# Patient Record
Sex: FEMALE | Race: WHITE | NOT HISPANIC OR LATINO | Employment: FULL TIME | URBAN - NONMETROPOLITAN AREA
[De-identification: names, ages, dates, MRNs, and addresses within clinical notes are randomized per-mention and may not be internally consistent; named-entity substitution may affect disease eponyms.]

---

## 2017-01-30 ENCOUNTER — TELEPHONE (OUTPATIENT)
Dept: FAMILY MEDICINE | Facility: OTHER | Age: 42
End: 2017-01-30

## 2017-01-30 ENCOUNTER — OFFICE VISIT (OUTPATIENT)
Dept: FAMILY MEDICINE | Facility: OTHER | Age: 42
End: 2017-01-30
Attending: FAMILY MEDICINE
Payer: COMMERCIAL

## 2017-01-30 VITALS
OXYGEN SATURATION: 98 % | SYSTOLIC BLOOD PRESSURE: 112 MMHG | TEMPERATURE: 98.3 F | DIASTOLIC BLOOD PRESSURE: 76 MMHG | HEIGHT: 66 IN | WEIGHT: 164 LBS | BODY MASS INDEX: 26.36 KG/M2 | HEART RATE: 74 BPM

## 2017-01-30 DIAGNOSIS — J20.9 ACUTE BRONCHITIS, UNSPECIFIED ORGANISM: Primary | ICD-10-CM

## 2017-01-30 PROCEDURE — 99213 OFFICE O/P EST LOW 20 MIN: CPT | Performed by: FAMILY MEDICINE

## 2017-01-30 RX ORDER — AZITHROMYCIN 250 MG/1
TABLET, FILM COATED ORAL
Qty: 6 TABLET | Refills: 0 | Status: SHIPPED | OUTPATIENT
Start: 2017-01-30 | End: 2017-03-03

## 2017-01-30 ASSESSMENT — PAIN SCALES - GENERAL: PAINLEVEL: MODERATE PAIN (5)
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
## 2017-01-30 NOTE — TELEPHONE ENCOUNTER
Please schedule patient for date/time: can see today at 4:00    Have patient go to ER/Urgent Care Center. Urgent Care hours are 9:30 am to 8 pm, open 7 days a week. No.    Provider will call patient.No.    Other:

## 2017-01-30 NOTE — NURSING NOTE
"Chief Complaint   Patient presents with     URI     sinus symptoms. throat pain, cough, headache, hoarse voice Duration- 3 days      *_* Health Care Directive *_*     done        Initial /76 mmHg  Pulse 74  Temp(Src) 98.3  F (36.8  C) (Tympanic)  Ht 5' 6\" (1.676 m)  Wt 164 lb (74.39 kg)  BMI 26.48 kg/m2  SpO2 98% Estimated body mass index is 26.48 kg/(m^2) as calculated from the following:    Height as of this encounter: 5' 6\" (1.676 m).    Weight as of this encounter: 164 lb (74.39 kg).  BP completed using cuff size: blanca HUSAIN      "

## 2017-01-30 NOTE — MR AVS SNAPSHOT
After Visit Summary   1/30/2017    Anabella Albrecht    MRN: [id number]           Patient Information     Date Of Birth          1975        Visit Information        Provider Department      1/30/2017 2:00 PM REAGAN Webster MD [facility]        Today's Diagnoses     Acute bronchitis, unspecified organism    -  1       Care Instructions    Follow up as needed        Follow-ups after your visit        Your next 10 appointments already scheduled     Jan 30, 2017  2:00 PM   (Arrive by 1:45 PM)   SHORT with ERAGAN Webster MD   [facility]    [address]  [facility] [address]   [phone]              Who to contact     If you have questions or need follow up information about today's clinic visit or your schedule please contact [facility] directly at [phone].  Normal or non-critical lab and imaging results will be communicated to you by MyChart, letter or phone within 4 business days after the clinic has received the results. If you do not hear from us within 7 days, please contact the clinic through MyChart or phone. If you have a critical or abnormal lab result, we will notify you by phone as soon as possible.  Submit refill requests through [employer] or call your pharmacy and they will forward the refill request to us. Please allow 3 business days for your refill to be completed.          Additional Information About Your Visit        MyChart Information     [employer] gives you secure access to your electronic health record. If you see a primary care provider, you can also send messages to your care team and make appointments. If you have questions, please call your primary care clinic.  If you do not have a primary care provider, please call [phone] and they will assist you.        Care EveryWhere ID     This is your Care EveryWhere ID. This could be used by other organizations to access your [facility]  "records  [id number]        Your Vitals Were     Pulse Temperature Height BMI (Body Mass Index) Pulse Oximetry       74 98.3  F (36.8  C) (Tympanic) 5' 6\" (1.676 m) 26.48 kg/m2 98%        Blood Pressure from Last 3 Encounters:   01/30/17 112/76   10/17/16 114/76   09/27/16 110/70    Weight from Last 3 Encounters:   01/30/17 164 lb (74.39 kg)   10/17/16 150 lb (68.04 kg)   09/27/16 160 lb (72.576 kg)              Today, you had the following     No orders found for display         Today's Medication Changes          These changes are accurate as of: 1/30/17  1:44 PM.  If you have any questions, ask your nurse or doctor.               Start taking these medicines.        Dose/Directions    azithromycin 250 MG tablet   Commonly known as:  ZITHROMAX   Used for:  Acute bronchitis, unspecified organism   Started by:  REAGAN Webster MD        Two tablets first day, then one tablet daily for four days.   Quantity:  6 tablet   Refills:  0            Where to get your medicines      These medications were sent to [facility] [place] - [address]  [address] [place] [address]     Phone:  [phone]    - azithromycin 250 MG tablet             Primary Care Provider Office Phone # Fax #    Elis Webster [phone]       [facility] [address] [facility] [place] [address]        Thank you!     Thank you for choosing [facility]  for your care. Our goal is always to provide you with excellent care. Hearing back from our patients is one way we can continue to improve our services. Please take a few minutes to complete the written survey that you may receive in the mail after your visit with us. Thank you!             Your Updated Medication List - Protect others around you: Learn how to safely use, store and throw away your medicines at www.disposemymeds.org.          This list is accurate as of: 1/30/17  1:44 PM.  Always use your most recent med list.          "          Brand Name Dispense Instructions for use    azithromycin 250 MG tablet    ZITHROMAX    6 tablet    Two tablets first day, then one tablet daily for four days.       FLUoxetine 40 MG capsule    PROzac    90 capsule    TAKE 1 CAPSULE BY MOUTH EVERY DAY

## 2017-01-30 NOTE — TELEPHONE ENCOUNTER
11:58 AM    Reason for Call: OVERBOOK    Patient is having the following symptoms: Patient has throat pain and cough for 3 days.    The patient is requesting an appointment for Elis Webster     Was an appointment offered for this call? Yes    Preferred method for responding to this message: Telephone Call    If we cannot reach you directly, may we leave a detailed response at the number you provided? Yes    Can this message wait until your PCP/provider returns, if unavailable today? teresa sandoval

## 2017-03-03 ENCOUNTER — HOSPITAL ENCOUNTER (EMERGENCY)
Facility: HOSPITAL | Age: 42
Discharge: HOME | End: 2017-03-03
Attending: NURSE PRACTITIONER | Admitting: NURSE PRACTITIONER
Payer: COMMERCIAL

## 2017-03-03 ENCOUNTER — TELEPHONE (OUTPATIENT)
Dept: FAMILY MEDICINE | Facility: OTHER | Age: 42
End: 2017-03-03

## 2017-03-03 VITALS
RESPIRATION RATE: 16 BRPM | DIASTOLIC BLOOD PRESSURE: 72 MMHG | SYSTOLIC BLOOD PRESSURE: 105 MMHG | TEMPERATURE: 97.5 F | OXYGEN SATURATION: 99 % | HEART RATE: 71 BPM

## 2017-03-03 DIAGNOSIS — R31.9 HEMATURIA: ICD-10-CM

## 2017-03-03 DIAGNOSIS — N32.89 BLADDER IRRITATION: ICD-10-CM

## 2017-03-03 LAB
ALBUMIN UR-MCNC: NEGATIVE MG/DL
APPEARANCE UR: CLEAR
BACTERIA #/AREA URNS HPF: ABNORMAL /HPF
BILIRUB UR QL STRIP: NEGATIVE
COLOR UR AUTO: YELLOW
GLUCOSE UR STRIP-MCNC: NEGATIVE MG/DL
HGB UR QL STRIP: NEGATIVE
HYALINE CASTS #/AREA URNS LPF: 1 /LPF (ref 0–2)
KETONES UR STRIP-MCNC: NEGATIVE MG/DL
LEUKOCYTE ESTERASE UR QL STRIP: NEGATIVE
MUCOUS THREADS #/AREA URNS LPF: PRESENT /LPF
NITRATE UR QL: NEGATIVE
PH UR STRIP: 7.5 PH (ref 4.7–8)
RBC #/AREA URNS AUTO: 8 /HPF (ref 0–2)
SP GR UR STRIP: 1.02 (ref 1–1.03)
URN SPEC COLLECT METH UR: ABNORMAL
UROBILINOGEN UR STRIP-MCNC: NORMAL MG/DL (ref 0–2)
WBC #/AREA URNS AUTO: 1 /HPF (ref 0–2)

## 2017-03-03 PROCEDURE — 81001 URINALYSIS AUTO W/SCOPE: CPT | Performed by: FAMILY MEDICINE

## 2017-03-03 PROCEDURE — 99213 OFFICE O/P EST LOW 20 MIN: CPT

## 2017-03-03 PROCEDURE — 99213 OFFICE O/P EST LOW 20 MIN: CPT | Performed by: NURSE PRACTITIONER

## 2017-03-03 RX ORDER — SULFAMETHOXAZOLE/TRIMETHOPRIM 800-160 MG
1 TABLET ORAL 2 TIMES DAILY
Qty: 14 TABLET | Refills: 0 | Status: SHIPPED | OUTPATIENT
Start: 2017-03-03 | End: 2017-03-06

## 2017-03-03 RX ORDER — PHENAZOPYRIDINE HYDROCHLORIDE 200 MG/1
200 TABLET, FILM COATED ORAL 3 TIMES DAILY
Qty: 20 TABLET | Refills: 0 | Status: SHIPPED | OUTPATIENT
Start: 2017-03-03 | End: 2017-03-06

## 2017-03-03 ASSESSMENT — ENCOUNTER SYMPTOMS
EYES NEGATIVE: 1
APPETITE CHANGE: 0
HEMATURIA: 0
FREQUENCY: 1
FLANK PAIN: 0
DYSURIA: 1
MUSCULOSKELETAL NEGATIVE: 1
ACTIVITY CHANGE: 0
RESPIRATORY NEGATIVE: 1
CHILLS: 0

## 2017-03-03 NOTE — ED AVS SNAPSHOT
[facility]    [address] [address]    Phone:  [phone]                                       Anabella Albrecht   MRN: [id number]    Department:  [facility]   Date of Visit:  3/3/2017           After Visit Summary Signature Page     I have received my discharge instructions, and my questions have been answered. I have discussed any challenges I see with this plan with the nurse or doctor.    ..........................................................................................................................................  Patient/Patient Representative Signature      ..........................................................................................................................................  Patient Representative Print Name and Relationship to Patient    ..................................................               ................................................  Date                                            Time    ..........................................................................................................................................  Reviewed by Signature/Title    ...................................................              ..............................................  Date                                                            Time

## 2017-03-03 NOTE — ED AVS SNAPSHOT
[facility]    [address]    [place] [address]    Phone:  [phone]                                       Anabella Albrecht   MRN: [id number]    Department:  [facility]   Date of Visit:  3/3/2017           Patient Information     Date Of Birth          1975        Your diagnoses for this visit were:     Bladder irritation     Hematuria        You were seen by Dona Lua NP.      Follow-up Information     Follow up with Elis Webster MD.    Specialty:  Family Practice    Why:  If symptoms worsen, As needed    Contact information:    [facility] [place]  [address]  [place] [address] [phone]          Follow up with [facility].    Specialty:  EMERGENCY MEDICINE    Why:  If symptoms worsen, As needed    Contact information:    [address]  [place] [address] [phone]    Additional information:    From [facility]: Take [address]. Turn left at [address]. Turn left at the first stoplight on [address]. At the first stop sign, take a right onto [address]. Take a left into the parking lot and continue through until you reach the North enterance of the building.       From [place]: Take [address]. Take the [address] ramp towards [place]. Turn left onto [address]. Take a slight right onto [address]. Turn left at the first stoplight on [address]. At the first stop sign, take a right onto [address]. Take a left into the parking lot and continue through until you reach the North enterance of the building.       From [place]: Take [address]. Take a right at [address]. At the first stop sign, take a right onto [address]. Take a left into the parking lot and continue through until you reach the North enterance of the building.       Discharge References/Attachments     HEMATURIA (ENGLISH)         Review of your medicines      START  taking        Dose / Directions Last dose taken    phenazopyridine 200 MG tablet   Commonly known as:  PYRIDIUM   Dose:  200 mg   Quantity:  20 tablet        Take 1 tablet (200 mg) by mouth 3 times daily for 3 days   Refills:  0        sulfamethoxazole-trimethoprim 800-160 MG per tablet   Commonly known as:  BACTRIM DS/SEPTRA DS   Dose:  1 tablet   Quantity:  14 tablet        Take 1 tablet by mouth 2 times daily for 3 days   Refills:  0          Our records show that you are taking the medicines listed below. If these are incorrect, please call your family doctor or clinic.        Dose / Directions Last dose taken    FLUoxetine 40 MG capsule   Commonly known as:  PROzac   Quantity:  90 capsule        TAKE 1 CAPSULE BY MOUTH EVERY DAY   Refills:  1                Prescriptions were sent or printed at these locations (2 Prescriptions)                   [facility] [place] - [address]   [address] [place] [address]    Telephone:  [phone]   Fax:  [phone]   Hours:                  E-Prescribed (2 of 2)         sulfamethoxazole-trimethoprim (BACTRIM DS/SEPTRA DS) 800-160 MG per tablet               phenazopyridine (PYRIDIUM) 200 MG tablet                Procedures and tests performed during your visit     Routine UA with microscopic      Orders Needing Specimen Collection     Ordered          03/03/17 1048  Urine Culture Aerobic Bacterial - ROUTINE, Prio: Routine, Needs to be Collected     Scheduled Task Status   03/03/17 1048 Collect Urine Culture Aerobic Bacterial Open   Order Class:  PCU Collect                  Pending Results     No orders found from 3/1/2017 to 3/4/2017.            Pending Culture Results     No orders found from 3/1/2017 to 3/4/2017.            Thank you for choosing [facility]       Thank you for choosing [facility] for your care. Our goal is always to provide you with excellent care. Hearing back from our patients is one way we can continue to improve our  services. Please take a few minutes to complete the written survey that you may receive in the mail after you visit with us. Thank you!        [employer] Information     [employer] gives you secure access to your electronic health record. If you see a primary care provider, you can also send messages to your care team and make appointments. If you have questions, please call your primary care clinic.  If you do not have a primary care provider, please call [phone] and they will assist you.        Care EveryWhere ID     This is your Care EveryWhere ID. This could be used by other organizations to access your [place] medical records  [id number]        After Visit Summary       This is your record. Keep this with you and show to your community pharmacist(s) and doctor(s) at your next visit.

## 2017-03-03 NOTE — TELEPHONE ENCOUNTER
8:07 AM    Reason for Call: OVERBOOK    Patient is having the following symptoms: uti  3rd or 4th day  The patient is requesting an appointment for pereira  Was an appointment offered for this call?noPreferred method for responding to this message:self    If we cannot reach you directly, may we leave a detailed response at the number you provided?yesCan this message wait until your PCP/provider returns, if unavailable today? Jos Greenfield

## 2017-03-03 NOTE — ED PROVIDER NOTES
History     Chief Complaint   Patient presents with     Rule out Urinary Tract Infection     urgency, frequency, burning with urination for last 3 days     The history is provided by the patient. No  was used.     Anabella Albrecht is a 41 year old female who presents with a week of dysuria, urinary urgency, frequency and supra pubic tenderness, denies flank pain, hematuria, rigors or chills.  Nofever.  She denies vaginal sx and no concerns regarding STI's.  She just finished her menses.  She has seen urology in the past with a negative uroscopy.     I have reviewed the Medications, Allergies, Past Medical and Surgical History, and Social History in the Epic system.    Review of Systems   Constitutional: Negative for activity change, appetite change and chills.   HENT: Negative.    Eyes: Negative.    Respiratory: Negative.    Genitourinary: Positive for dysuria, frequency and urgency. Negative for decreased urine volume, flank pain, hematuria, menstrual problem and vaginal discharge.   Musculoskeletal: Negative.    Skin: Negative.        Physical Exam   BP: 105/72  Pulse: 71  Temp: 97.5  F (36.4  C)  Resp: 16  SpO2: 99 %  Physical Exam   Constitutional: She is oriented to person, place, and time. She appears well-developed and well-nourished. No distress.   HENT:   Head: Normocephalic and atraumatic.   Eyes: Pupils are equal, round, and reactive to light.   Neck: Normal range of motion.   Cardiovascular: Normal rate.    Abdominal: Soft. Bowel sounds are normal. She exhibits no mass. There is no tenderness. There is no rebound and no guarding.   Minimal supra pubic tenderness   Musculoskeletal: Normal range of motion.   Neurological: She is alert and oriented to person, place, and time.   Skin: Skin is warm and dry. No rash noted. She is not diaphoretic. No pallor.   Nursing note and vitals reviewed.      ED Course     ED Course     Procedures               Labs Ordered and Resulted from Time of ED  Arrival Up to the Time of Departure from the ED   ROUTINE UA WITH MICROSCOPIC - Abnormal; Notable for the following:        Result Value    RBC Urine 8 (*)     Bacteria Urine None (*)     Mucous Urine Present (*)     Hyaline Casts 1 (*)     All other components within normal limits       Assessments & Plan (with Medical Decision Making)     I have reviewed the nursing notes.    I have reviewed the findings, diagnosis, plan and need for follow up with the patient.    Pathophysiology, possible etiology and treatment with potential outcomes, risks, benefits, and alternatives discussed to the best of my ability      Pt was very concerned there may be infection even though the UA is negative.  Presumptively treated with septra ds and will order UC.    She should use the pyridium for only the next 2 days or so.  Instructed on potential cases of bladder irritation.    Pt verbalizes understanding and agreement with plan.  Follow up for worsening symptoms    Discharge Medication List as of 3/3/2017 10:51 AM      START taking these medications    Details   sulfamethoxazole-trimethoprim (BACTRIM DS/SEPTRA DS) 800-160 MG per tablet Take 1 tablet by mouth 2 times daily for 3 days, Disp-14 tablet, R-0, E-Prescribe      phenazopyridine (PYRIDIUM) 200 MG tablet Take 1 tablet (200 mg) by mouth 3 times daily for 3 days, Disp-20 tablet, R-0, E-Prescribe             Final diagnoses:   Bladder irritation   Hematuria       3/3/2017   [facility]     Dona Lua NP  03/03/17 5023

## 2017-04-26 ENCOUNTER — OFFICE VISIT (OUTPATIENT)
Dept: OBGYN | Facility: OTHER | Age: 42
End: 2017-04-26
Attending: ADVANCED PRACTICE MIDWIFE
Payer: COMMERCIAL

## 2017-04-26 VITALS
HEIGHT: 66 IN | DIASTOLIC BLOOD PRESSURE: 68 MMHG | BODY MASS INDEX: 24.11 KG/M2 | SYSTOLIC BLOOD PRESSURE: 110 MMHG | OXYGEN SATURATION: 98 % | WEIGHT: 150 LBS | HEART RATE: 75 BPM

## 2017-04-26 DIAGNOSIS — N93.9 ABNORMAL UTERINE BLEEDING: Primary | ICD-10-CM

## 2017-04-26 PROBLEM — N92.0 MENORRHAGIA WITH REGULAR CYCLE: Status: ACTIVE | Noted: 2017-04-26

## 2017-04-26 LAB
APTT PPP: 28 SEC (ref 24–37)
CLOSURE TME COLL+EPINEP BLD: 98 SEC
ERYTHROCYTE [DISTWIDTH] IN BLOOD BY AUTOMATED COUNT: 12.4 % (ref 10–15)
EST. AVERAGE GLUCOSE BLD GHB EST-MCNC: 100 MG/DL
HBA1C MFR BLD: 5.1 % (ref 4.3–6)
HCT VFR BLD AUTO: 38.8 % (ref 35–47)
HGB BLD-MCNC: 12.7 G/DL (ref 11.7–15.7)
INR PPP: 1.02 (ref 0.8–1.2)
MCH RBC QN AUTO: 29.3 PG (ref 26.5–33)
MCHC RBC AUTO-ENTMCNC: 32.7 G/DL (ref 31.5–36.5)
MCV RBC AUTO: 89 FL (ref 78–100)
MICRO REPORT STATUS: NORMAL
PLATELET # BLD AUTO: 256 10E9/L (ref 150–450)
RBC # BLD AUTO: 4.34 10E12/L (ref 3.8–5.2)
SPECIMEN SOURCE: NORMAL
TSH SERPL DL<=0.005 MIU/L-ACNC: 1.7 MU/L (ref 0.4–4)
WBC # BLD AUTO: 6.9 10E9/L (ref 4–11)
WET PREP SPEC: NORMAL

## 2017-04-26 PROCEDURE — 84270 ASSAY OF SEX HORMONE GLOBUL: CPT | Mod: 90 | Performed by: ADVANCED PRACTICE MIDWIFE

## 2017-04-26 PROCEDURE — 99213 OFFICE O/P EST LOW 20 MIN: CPT | Performed by: ADVANCED PRACTICE MIDWIFE

## 2017-04-26 PROCEDURE — 85610 PROTHROMBIN TIME: CPT | Performed by: ADVANCED PRACTICE MIDWIFE

## 2017-04-26 PROCEDURE — 87210 SMEAR WET MOUNT SALINE/INK: CPT | Performed by: ADVANCED PRACTICE MIDWIFE

## 2017-04-26 PROCEDURE — 88142 CYTOPATH C/V THIN LAYER: CPT | Performed by: ADVANCED PRACTICE MIDWIFE

## 2017-04-26 PROCEDURE — 85027 COMPLETE CBC AUTOMATED: CPT | Performed by: ADVANCED PRACTICE MIDWIFE

## 2017-04-26 PROCEDURE — 87624 HPV HI-RISK TYP POOLED RSLT: CPT | Mod: 90 | Performed by: ADVANCED PRACTICE MIDWIFE

## 2017-04-26 PROCEDURE — 85246 CLOT FACTOR VIII VW ANTIGEN: CPT | Mod: 90 | Performed by: ADVANCED PRACTICE MIDWIFE

## 2017-04-26 PROCEDURE — 83520 IMMUNOASSAY QUANT NOS NONAB: CPT | Mod: 90 | Performed by: ADVANCED PRACTICE MIDWIFE

## 2017-04-26 PROCEDURE — 99000 SPECIMEN HANDLING OFFICE-LAB: CPT | Performed by: ADVANCED PRACTICE MIDWIFE

## 2017-04-26 PROCEDURE — 83036 HEMOGLOBIN GLYCOSYLATED A1C: CPT | Performed by: ADVANCED PRACTICE MIDWIFE

## 2017-04-26 PROCEDURE — 85245 CLOT FACTOR VIII VW RISTOCTN: CPT | Mod: 90 | Performed by: ADVANCED PRACTICE MIDWIFE

## 2017-04-26 PROCEDURE — 85240 CLOT FACTOR VIII AHG 1 STAGE: CPT | Mod: 90 | Performed by: ADVANCED PRACTICE MIDWIFE

## 2017-04-26 PROCEDURE — 82627 DEHYDROEPIANDROSTERONE: CPT | Mod: 90 | Performed by: ADVANCED PRACTICE MIDWIFE

## 2017-04-26 PROCEDURE — 84403 ASSAY OF TOTAL TESTOSTERONE: CPT | Mod: 90 | Performed by: ADVANCED PRACTICE MIDWIFE

## 2017-04-26 PROCEDURE — 36415 COLL VENOUS BLD VENIPUNCTURE: CPT | Performed by: ADVANCED PRACTICE MIDWIFE

## 2017-04-26 PROCEDURE — 85576 BLOOD PLATELET AGGREGATION: CPT | Performed by: ADVANCED PRACTICE MIDWIFE

## 2017-04-26 PROCEDURE — 84443 ASSAY THYROID STIM HORMONE: CPT | Performed by: ADVANCED PRACTICE MIDWIFE

## 2017-04-26 PROCEDURE — 85730 THROMBOPLASTIN TIME PARTIAL: CPT | Performed by: ADVANCED PRACTICE MIDWIFE

## 2017-04-26 PROCEDURE — 84146 ASSAY OF PROLACTIN: CPT | Mod: 90 | Performed by: ADVANCED PRACTICE MIDWIFE

## 2017-04-26 ASSESSMENT — PAIN SCALES - GENERAL: PAINLEVEL: NO PAIN (0)

## 2017-04-26 NOTE — NURSING NOTE
"Chief Complaint   Patient presents with     Vaginal Bleeding     Heavy periods       Initial /68 (BP Location: Left arm, Patient Position: Chair, Cuff Size: Adult Regular)  Pulse 75  Ht 5' 6\" (1.676 m)  Wt 150 lb (68 kg)  LMP 04/18/2017  SpO2 98%  BMI 24.21 kg/m2 Estimated body mass index is 24.21 kg/(m^2) as calculated from the following:    Height as of this encounter: 5' 6\" (1.676 m).    Weight as of this encounter: 150 lb (68 kg).  Medication Reconciliation: charles Bolden      "

## 2017-04-26 NOTE — PROGRESS NOTES
"Anabella Albrecht is a 41 year old female  Here today for heavy periods.  Cycle:  25 days Bleed: 5 days with 4 heavy days with clots   Pain:  10/10  Contraception:   had vasectomy and was tested.  Reports hot flashes.  Vaginal discharge before and after period.  Denies any personal or family history of clotting disorder.  Reports some leg cramps, occasional light-headedness.  Pt seen Dr. ADAMS Webster for her annual appt. In Sept. 2016.    O:   /68 (BP Location: Left arm, Patient Position: Chair, Cuff Size: Adult Regular)  Pulse 75  Ht 5' 6\" (1.676 m)  Wt 150 lb (68 kg)  LMP 04/18/2017  SpO2 98%  BMI 24.21 kg/m2   Pleasant without acute distress  Pelvic:  Normal female genitalia without lesions.  Vagina well rugated without lesions.  Uterus retroflexed normal size, firm and freely mobile.  Cervix negative for CMT or lesions.  Milky discharge noted and friable.    A:  Menorrhagia  Dysmenorrhea  Hot flashes/night sweats   > 10 years/declines STI testing      P:    Pelvic exam  Pap with HPV  Labs:  CBC, A1C, TSH,wet prep, DHEA, free testosterone, PT, INR, Von willebrand, , AMA, Prolactin  Declines STI testing  Transvaginal pelvic ultrasound for AUB  RTO next week for EMB and discuss lab/US results        Greater than 25 minutes were spent face to face counseling this patient abnormal uterine bleeding, possible causes, labs, US, EMB, possible options for treatment depending on diagnosis.    NAMRATA Johnson, CNM    "

## 2017-04-26 NOTE — MR AVS SNAPSHOT
"              After Visit Summary   4/26/2017    Anabella Albrecht    MRN: [id number]           Patient Information     Date Of Birth          1975        Visit Information        Provider Department      4/26/2017 11:00 AM Jmi Amanda [facility]N [facility]        Today's Diagnoses     Abnormal uterine bleeding    -  1      Care Instructions    Return to office next week.        Follow-ups after your visit        Who to contact     If you have questions or need follow up information about today's clinic visit or your schedule please contact [facility] directly at [phone].  Normal or non-critical lab and imaging results will be communicated to you by [employer], letter or phone within 4 business days after the clinic has received the results. If you do not hear from us within 7 days, please contact the clinic through [employer] or phone. If you have a critical or abnormal lab result, we will notify you by phone as soon as possible.  Submit refill requests through [employer] or call your pharmacy and they will forward the refill request to us. Please allow 3 business days for your refill to be completed.          Additional Information About Your Visit        MyChart Information     [employer] gives you secure access to your electronic health record. If you see a primary care provider, you can also send messages to your care team and make appointments. If you have questions, please call your primary care clinic.  If you do not have a primary care provider, please call [phone] and they will assist you.        Care EveryWhere ID     This is your Care EveryWhere ID. This could be used by other organizations to access your [place] medical records  [id number]        Your Vitals Were     Pulse Height Last Period Pulse Oximetry BMI (Body Mass Index)       75 5' 6\" (1.676 m) 04/18/2017 98% 24.21 kg/m2        Blood Pressure from Last 3 Encounters:   04/26/17 110/68   03/03/17 105/72   01/30/17 " 112/76    Weight from Last 3 Encounters:   04/26/17 150 lb (68 kg)   01/30/17 164 lb (74.4 kg)   10/17/16 150 lb (68 kg)              We Performed the Following     A pap thin layer screen with  HPV - recommended age 30 - 65 years (select HPV order below)     Anti-Mullerian hormone     CBC with platelets     DHEA sulfate     Factor 8 assay     Hemoglobin A1c     HPV High Risk Types DNA Cervical     INR     Partial thromboplastin time     Platelet function closure with reflex     Prolactin     Testosterone Free and Total     TSH with free T4 reflex     US Transvaginal ([place])     Von Willebrand antigen     von Willebrand Factor Activity     Wet prep        Primary Care Provider Office Phone # Fax #    Elis Webster [phone]       [facility] [place] [address]  [place] [address]        Thank you!     Thank you for choosing [facility] [place]  for your care. Our goal is always to provide you with excellent care. Hearing back from our patients is one way we can continue to improve our services. Please take a few minutes to complete the written survey that you may receive in the mail after your visit with us. Thank you!             Your Updated Medication List - Protect others around you: Learn how to safely use, store and throw away your medicines at www.disposemymeds.org.          This list is accurate as of: 4/26/17 12:43 PM.  Always use your most recent med list.                   Brand Name Dispense Instructions for use    FLUoxetine 40 MG capsule    PROzac    90 capsule    TAKE 1 CAPSULE BY MOUTH EVERY DAY

## 2017-04-27 LAB — PROLACTIN SERPL-MCNC: 12 UG/L (ref 3–27)

## 2017-04-28 LAB — DHEA-S SERPL-MCNC: 106 UG/DL (ref 35–430)

## 2017-05-01 LAB
FACT VIII ACT/NOR PPP: 138 % (ref 55–200)
MIS SERPL-MCNC: 5.29 NG/ML
SHBG SERPL-SCNC: 62 NMOL/L (ref 30–135)
TESTOST FREE SERPL-MCNC: 0.61 NG/DL (ref 0.13–0.92)
TESTOST SERPL-MCNC: 52 NG/DL (ref 8–60)
VWF CBA/VWF AG PPP IA-RTO: 109 % (ref 50–200)
VWF:AC ACT/NOR PPP IA: 102 % (ref 50–180)

## 2017-05-03 LAB
COPATH REPORT: NORMAL
PAP: NORMAL

## 2017-05-05 ENCOUNTER — HOSPITAL ENCOUNTER (OUTPATIENT)
Dept: ULTRASOUND IMAGING | Facility: HOSPITAL | Age: 42
Discharge: HOME | End: 2017-05-05
Attending: ADVANCED PRACTICE MIDWIFE | Admitting: ADVANCED PRACTICE MIDWIFE
Payer: COMMERCIAL

## 2017-05-05 LAB
FINAL DIAGNOSIS: NORMAL
HPV HR 12 DNA CVX QL NAA+PROBE: NEGATIVE
HPV16 DNA SPEC QL NAA+PROBE: NEGATIVE
HPV18 DNA SPEC QL NAA+PROBE: NEGATIVE
SPECIMEN DESCRIPTION: NORMAL

## 2017-05-05 PROCEDURE — 76830 TRANSVAGINAL US NON-OB: CPT | Mod: TC

## 2017-05-05 PROCEDURE — 76856 US EXAM PELVIC COMPLETE: CPT | Mod: TC

## 2017-05-10 ENCOUNTER — OFFICE VISIT (OUTPATIENT)
Dept: OBGYN | Facility: OTHER | Age: 42
End: 2017-05-10
Attending: ADVANCED PRACTICE MIDWIFE
Payer: COMMERCIAL

## 2017-05-10 VITALS
OXYGEN SATURATION: 96 % | SYSTOLIC BLOOD PRESSURE: 110 MMHG | HEART RATE: 88 BPM | BODY MASS INDEX: 24.11 KG/M2 | DIASTOLIC BLOOD PRESSURE: 70 MMHG | WEIGHT: 150 LBS | HEIGHT: 66 IN

## 2017-05-10 DIAGNOSIS — N93.9 ABNORMAL UTERINE BLEEDING (AUB): Primary | ICD-10-CM

## 2017-05-10 LAB
MICRO REPORT STATUS: NORMAL
SPECIMEN SOURCE: NORMAL
WET PREP SPEC: NORMAL

## 2017-05-10 PROCEDURE — 99000 SPECIMEN HANDLING OFFICE-LAB: CPT | Performed by: ADVANCED PRACTICE MIDWIFE

## 2017-05-10 PROCEDURE — 58100 BIOPSY OF UTERUS LINING: CPT | Mod: 59 | Performed by: ADVANCED PRACTICE MIDWIFE

## 2017-05-10 PROCEDURE — 99213 OFFICE O/P EST LOW 20 MIN: CPT | Mod: 25 | Performed by: ADVANCED PRACTICE MIDWIFE

## 2017-05-10 PROCEDURE — 87591 N.GONORRHOEAE DNA AMP PROB: CPT | Mod: 90 | Performed by: ADVANCED PRACTICE MIDWIFE

## 2017-05-10 PROCEDURE — 58300 INSERT INTRAUTERINE DEVICE: CPT | Performed by: ADVANCED PRACTICE MIDWIFE

## 2017-05-10 PROCEDURE — 88305 TISSUE EXAM BY PATHOLOGIST: CPT | Mod: TC | Performed by: ADVANCED PRACTICE MIDWIFE

## 2017-05-10 PROCEDURE — 87210 SMEAR WET MOUNT SALINE/INK: CPT | Performed by: ADVANCED PRACTICE MIDWIFE

## 2017-05-10 PROCEDURE — 87491 CHLMYD TRACH DNA AMP PROBE: CPT | Mod: 90 | Performed by: ADVANCED PRACTICE MIDWIFE

## 2017-05-10 ASSESSMENT — PAIN SCALES - GENERAL: PAINLEVEL: NO PAIN (0)

## 2017-05-10 NOTE — NURSING NOTE
"Chief Complaint   Patient presents with     RECHECK     Abnormal Uterine bleeding follow up        Initial /70 (BP Location: Left arm, Patient Position: Chair, Cuff Size: Adult Regular)  Pulse 88  Ht 5' 6\" (1.676 m)  Wt 150 lb (68 kg)  LMP 04/18/2017  SpO2 96%  BMI 24.21 kg/m2 Estimated body mass index is 24.21 kg/(m^2) as calculated from the following:    Height as of this encounter: 5' 6\" (1.676 m).    Weight as of this encounter: 150 lb (68 kg).  Medication Reconciliation: charles Bolden      "

## 2017-05-10 NOTE — MR AVS SNAPSHOT
After Visit Summary   5/10/2017    Anabella Albrecht    MRN: [id number]           Patient Information     Date Of Birth          1975        Visit Information        Provider Department      5/10/2017 10:30 AM Jim Amanda APRN [facility]        Today's Diagnoses     Abnormal uterine bleeding (AUB)    -  1      Care Instructions    Return to office in 3 weeks for follow up,        Follow-ups after your visit        Your next 10 appointments already scheduled     May 31, 2017 11:00 AM CDT   (Arrive by 10:45 AM)   SHORT with NAMRATA Montez CNM   [facility] ([facility])    [address]  [facility] [address]   [phone]              Who to contact     If you have questions or need follow up information about today's clinic visit or your schedule please contact [facility] directly at [phone].  Normal or non-critical lab and imaging results will be communicated to you by MyChart, letter or phone within 4 business days after the clinic has received the results. If you do not hear from us within 7 days, please contact the clinic through VPEPhart or phone. If you have a critical or abnormal lab result, we will notify you by phone as soon as possible.  Submit refill requests through Optini or call your pharmacy and they will forward the refill request to us. Please allow 3 business days for your refill to be completed.          Additional Information About Your Visit        MyChart Information     Optini gives you secure access to your electronic health record. If you see a primary care provider, you can also send messages to your care team and make appointments. If you have questions, please call your primary care clinic.  If you do not have a primary care provider, please call [phone] and they will assist you.        Care EveryWhere ID     This is your Care EveryWhere ID. This could be used by other organizations to access your  "[place] medical records  [id number]        Your Vitals Were     Pulse Height Last Period Pulse Oximetry BMI (Body Mass Index)       88 5' 6\" (1.676 m) 04/18/2017 96% 24.21 kg/m2        Blood Pressure from Last 3 Encounters:   05/10/17 110/70   04/26/17 110/68   03/03/17 105/72    Weight from Last 3 Encounters:   05/10/17 150 lb (68 kg)   04/26/17 150 lb (68 kg)   01/30/17 164 lb (74.4 kg)              We Performed the Following     Chlamydia trachomatis PCR     ENDOMETRIAL BIOPSY W/O CERVICAL DILATION     HC LEVONORGESTREL IU 52MG 5 YR     INSERTION INTRAUTERINE DEVICE     Neisseria gonorrhoeae PCR     Surgical pathology exam     Wet prep          Today's Medication Changes          These changes are accurate as of: 5/10/17  4:31 PM.  If you have any questions, ask your nurse or doctor.               Start taking these medicines.        Dose/Directions    levonorgestrel 20 MCG/24HR IUD   Commonly known as:  MIRENA   Used for:  Abnormal uterine bleeding (AUB)   Started by:  Jim Amanda APRN CNM        Dose:  1 each   1 each (20 mcg) by Intrauterine route continuous   Refills:  0            Where to get your medicines      Some of these will need a paper prescription and others can be bought over the counter.  Ask your nurse if you have questions.     You don't need a prescription for these medications     levonorgestrel 20 MCG/24HR IUD                Primary Care Provider Office Phone # Fax #    Elis Webster [phone]       [facility] [place] [address] [place], [place] [address]K[place]  [facility] [address]        Thank you!     Thank you for choosing [facility]  for your care. Our goal is always to provide you with excellent care. Hearing back from our patients is one way we can continue to improve our services. Please take a few minutes to complete the written survey that you may receive in the mail after your visit with us. Thank you!             Your Updated Medication List - Protect others around " you: Learn how to safely use, store and throw away your medicines at www.disposemymeds.org.          This list is accurate as of: 5/10/17  4:31 PM.  Always use your most recent med list.                   Brand Name Dispense Instructions for use    FLUoxetine 40 MG capsule    PROzac    90 capsule    TAKE 1 CAPSULE BY MOUTH EVERY DAY       levonorgestrel 20 MCG/24HR IUD    MIRENA     1 each (20 mcg) by Intrauterine route continuous

## 2017-05-10 NOTE — PROGRESS NOTES
"Anabella Albrecht is a 41 year old female  Here today for AUB and contraceptive for bleeding.  Pt desires Mirena IUD.  Reviewed labs and imaging.      O:   /70 (BP Location: Left arm, Patient Position: Chair, Cuff Size: Adult Regular)  Pulse 88  Ht 5' 6\" (1.676 m)  Wt 150 lb (68 kg)  LMP 04/18/2017  SpO2 96%  BMI 24.21 kg/m2   CC:  IUD insertion for bleeding control  HPI:  Anabella Albrecht is a 41 year old female Patient's last menstrual period was 04/18/2017.  No other c/o.  She is here for an IUD insertion. Patient has verbalized understanding of risks and benefits and has signed the consent form.          Prior ectopic n  Prior CT n    Allergies: Review of patient's allergies indicates no known allergies.    EXAM:  Blood pressure 110/70, pulse 88, height 5' 6\" (1.676 m), weight 150 lb (68 kg), last menstrual period 04/18/2017, SpO2 96 %, not currently breastfeeding.  General - pleasant female in no acute distress.  Pelvic - EG: normal adult female, BUS: within normal limits,   Vagina: well rugated, no discharge,   Cervix: no lesions or CMT  Uterus: retroflexed, firm, normal sized and nontender, Adnexae: no masses or tenderness.    PROCEDURE:  After informed consent was obtained from the patient, a speculum was placed in the vagina to visualize the cervix.  Tenaculum was applied to the anterior cervical lip. Endometrial biopsy pipelle was passed through the cervical os and tissue obtained.  Uterus sounded to 6 cm.  Tenaculum was removed and sites were hemostatic. There were no complications. The patient tolerated the procedure well with a minimal amount of cramping noted.  Specimen was sent to pathology.        PROCEDURE:  After informed consent was obtained from the patient, a speculum was placed in the vagina to visualized the cervix  Tenaculum was placed at the 12 o'clock.  The Mirena  IUD was then placed in the usual fashion.  Strings were clipped about 2-3 cm from the cervical os.  Tenaculum was " removed and cervix was hemostatic. There were no complications. The patient tolerated the procedure well.    5/10 went to 3/10 immediately after.    The patient should feel for the IUD strings after her next menses.  If unable to locate them, she should return to clinic for a speculum examination for confirmation that the IUD is in place. Bleeding pattern of this particular IUD was discussed with the patient. She is aware that the IUD will need to be removed in 5 years or PRN.  She is to return in 3 wks  to clinic and for her next annual or PRN.      .  A:  AUB  Menorrhagia  Desires Mirena IUD    P:  EMB  Wet prep, GC/CT  Mirena IUD  Return in 3 weeks for follow up    Greater than 15 minutes in addition to procedures were spent face to face counseling this patient reviewing labs and imaging, contraceptives for bleeding, effectiveness, risks, and benefits, described both procedures, EMB and insertton of IUD and the risks and benefits of both.  Discussed other hormonal issues including breast discomfort and cramping.      NAMRATA Johnson, CNM

## 2017-05-11 ENCOUNTER — TELEPHONE (OUTPATIENT)
Dept: FAMILY MEDICINE | Facility: OTHER | Age: 42
End: 2017-05-11

## 2017-05-11 NOTE — TELEPHONE ENCOUNTER
Follow up:  Pt had EMB and IUD insertion yesterday.  Pt reports only occasional mild cramping without bleeding.

## 2017-05-12 LAB
C TRACH DNA SPEC QL NAA+PROBE: NORMAL
COPATH REPORT: NORMAL
N GONORRHOEA DNA SPEC QL NAA+PROBE: NORMAL
SPECIMEN SOURCE: NORMAL
SPECIMEN SOURCE: NORMAL

## 2017-05-31 ENCOUNTER — OFFICE VISIT (OUTPATIENT)
Dept: OBGYN | Facility: OTHER | Age: 42
End: 2017-05-31
Attending: ADVANCED PRACTICE MIDWIFE
Payer: COMMERCIAL

## 2017-05-31 VITALS
BODY MASS INDEX: 24.11 KG/M2 | HEART RATE: 76 BPM | DIASTOLIC BLOOD PRESSURE: 72 MMHG | WEIGHT: 150 LBS | SYSTOLIC BLOOD PRESSURE: 108 MMHG | HEIGHT: 66 IN | OXYGEN SATURATION: 99 %

## 2017-05-31 DIAGNOSIS — Z30.49 ENCOUNTER FOR SURVEILLANCE OF OTHER CONTRACEPTIVE: ICD-10-CM

## 2017-05-31 PROCEDURE — 99212 OFFICE O/P EST SF 10 MIN: CPT | Performed by: ADVANCED PRACTICE MIDWIFE

## 2017-05-31 ASSESSMENT — PAIN SCALES - GENERAL: PAINLEVEL: EXTREME PAIN (8)

## 2017-05-31 NOTE — NURSING NOTE
"Chief Complaint   Patient presents with     RECHECK     IUD check        Initial /72 (BP Location: Left arm, Patient Position: Chair, Cuff Size: Adult Regular)  Pulse 76  Ht 5' 6\" (1.676 m)  Wt 150 lb (68 kg)  SpO2 99%  BMI 24.21 kg/m2 Estimated body mass index is 24.21 kg/(m^2) as calculated from the following:    Height as of this encounter: 5' 6\" (1.676 m).    Weight as of this encounter: 150 lb (68 kg).  Medication Reconciliation: charles Bolden      "

## 2017-05-31 NOTE — MR AVS SNAPSHOT
"              After Visit Summary   5/31/2017    Anabella Albrecht    MRN: [id number]           Patient Information     Date Of Birth          1975        Visit Information        Provider Department      5/31/2017 11:00 AM Jim Amanda [facility]N [facility]        Today's Diagnoses     Encounter for surveillance of other contraceptive          Care Instructions    Follow up as needed.          Follow-ups after your visit        Who to contact     If you have questions or need follow up information about today's clinic visit or your schedule please [facility] at [phone].  Normal or non-critical lab and imaging results will be communicated to you by Graviehart, letter or phone within 4 business days after the clinic has received the results. If you do not hear from us within 7 days, please contact the clinic through Graviehart or phone. If you have a critical or abnormal lab result, we will notify you by phone as soon as possible.  Submit refill requests through HubCast or call your pharmacy and they will forward the refill request to us. Please allow 3 business days for your refill to be completed.          Additional Information About Your Visit        MyChart Information     HubCast gives you secure access to your electronic health record. If you see a primary care provider, you can also send messages to your care team and make appointments. If you have questions, please call your primary care clinic.  If you do not have a primary care provider, please call [phone] and they will assist you.        Care EveryWhere ID     This is your Care EveryWhere ID. This could be used by other organizations to access your [place] medical records  [id number]        Your Vitals Were     Pulse Height Pulse Oximetry BMI (Body Mass Index)          76 5' 6\" (1.676 m) 99% 24.21 kg/m2         Blood Pressure from Last 3 Encounters:   05/31/17 108/72   05/10/17 110/70   04/26/17 " 110/68    Weight from Last 3 Encounters:   05/31/17 150 lb (68 kg)   05/10/17 150 lb (68 kg)   04/26/17 150 lb (68 kg)              Today, you had the following     No orders found for display       Primary Care Provider Office Phone # Fax #    Elis Webster [phone]       [facility] [place] [address]  [place] [address]        Thank you!     [facility] you [facility]  [facility] your [facility]. Our goal is always to provide you with excellent care. Hearing back from our patients is one way we can continue to improve our services. Please take a few minutes to complete the written survey that you may receive in the mail after your visit with us. Thank you!             Your Updated Medication List - Protect others around you: Learn how to safely use, store and throw away your medicines at www.disposemymeds.org.          This list is accurate as of: 5/31/17 11:59 AM.  Always use your most recent med list.                   Brand Name Dispense Instructions for use    levonorgestrel 20 MCG/24HR IUD    MIRENA     1 each (20 mcg) by Intrauterine route continuous

## 2017-05-31 NOTE — PROGRESS NOTES
"Anabella Albrecht is a 41 year old female  Here today for IUD 3 week follow up.  Reports she had her period the day after the IUD insertion.  She has experienced occasional spotting since then.  Denies pelvic pain or discomfort.  Breast are feeling better, less hard, and less painful.      O:   /72 (BP Location: Left arm, Patient Position: Chair, Cuff Size: Adult Regular)  Pulse 76  Ht 5' 6\" (1.676 m)  Wt 150 lb (68 kg)  SpO2 99%  BMI 24.21 kg/m2   Pleasant without acute distress.  Pelvic:  Normal female genitalia without lesions.  Vagina well rugated without lesions.  Cervix negative for lesions or CMT; minimal amount of brownish discharge.  IUD strings visible.    A:  Needs IUD placement 3 week follow up    P:  IUD strings visible.  RTO as needed    Greater than 10 minutes were spent face to face counseling this patient    NAMRATA Johnson, CNM    "

## 2017-07-10 ENCOUNTER — TELEPHONE (OUTPATIENT)
Dept: FAMILY MEDICINE | Facility: OTHER | Age: 42
End: 2017-07-10

## 2017-07-10 DIAGNOSIS — R61 GENERALIZED HYPERHIDROSIS: Primary | ICD-10-CM

## 2017-07-10 NOTE — TELEPHONE ENCOUNTER
Reason for call:  REFERRAL   1. Concern: Hyper hydrosis  2. Have you seen a provider for this concern? Yes  3. Who? Dr. Webster   4. When? Ongoing issue  5. What services are you requesting? Referral to skin renewal in [place]  Description: Patient would like to speak with nurse for a referral to skin renewal in [place].  Was an appointment offered for this a call? No  Preferred method for responding to this message: Telephone Call  If we cannot reach you directly, may we leave a detailed response at the number you provided? Yes  Can this message wait until your PCP/Provider returns if not available today? Not applicable,

## 2017-07-11 NOTE — TELEPHONE ENCOUNTER
New referral placed with correct diagnoses, please advise to send this one instead of the old one.

## 2017-07-11 NOTE — TELEPHONE ENCOUNTER
Pt called back and stated the wrong diagnosis was sent for this. I should have been for hyperhydrosis. Please call pt back at [phone]

## 2017-07-12 ENCOUNTER — TELEPHONE (OUTPATIENT)
Dept: FAMILY MEDICINE | Facility: OTHER | Age: 42
End: 2017-07-12

## 2017-07-12 NOTE — TELEPHONE ENCOUNTER
Patient called and said the diagnosis is still wrong for the referral for the skin renewal.  Please call patient at [phone].

## 2017-07-13 ENCOUNTER — TELEPHONE (OUTPATIENT)
Dept: FAMILY MEDICINE | Facility: OTHER | Age: 42
End: 2017-07-13

## 2017-07-13 NOTE — TELEPHONE ENCOUNTER
9:35 AM    Reason for Call: Phone Call    Description: Pt stated she needs to discuss a referral diagnosis code, but did not give further detail. Please call her back at [phone]    Was an appointment offered for this call? No    Preferred method for responding to this message: Telephone Call    If we cannot reach you directly, may we leave a detailed response at the number you provided? Yes    Can this message wait until your PCP/provider returns, if available today? Not applicable    Sharri Mann

## 2017-07-13 NOTE — TELEPHONE ENCOUNTER
Awaiting signature, then will be faxed to [facility] at [phone]

## 2017-07-13 NOTE — LETTER
[facility] [place]  [address]  [place] [address]  [phone]      July 13, 2017      Anabella Albrecht  [address]  [facility] [address]        To Whom it May Concern,      Anabella Albrecht has been under my care for severe Dyshydrosis which has been a life long problem for her and has not been controlled with over the counter medications or  prescription medications which we have tried for several years without success.          Sincerely,        Elis Webster MD

## 2017-07-13 NOTE — TELEPHONE ENCOUNTER
Called and left message that the original referral from 2013 was for dyshydrosis but this was wrong so the new referral was placed with diagnosis of generalized hyperhydrosis. I did advise if this is still not correct to let us know otherwise if its ok then there is no need to call me back.

## 2017-07-13 NOTE — TELEPHONE ENCOUNTER
Called patient back in regards to this, they did get our new referral with the correct diagnosis but she needs a letter also stating that she has done OTC treatments, a letter was done was by you 10/14/2013 if you could refer to that letter and do a new one, then once its signed we can fax to [facility] for her.

## 2017-07-20 ENCOUNTER — TELEPHONE (OUTPATIENT)
Dept: FAMILY MEDICINE | Facility: OTHER | Age: 42
End: 2017-07-20

## 2017-07-20 NOTE — TELEPHONE ENCOUNTER
Anabella called stating we need to send a corrected letter the the correct diagnosis in it.  The Diagnosis should be generalized hyperhidrosis.  Please let me know when the letter is corrected so I can fax it to [facility].

## 2017-07-20 NOTE — LETTER
[facility] [place]  [address]  [place] [address]  [phone]          July 23, 2017    RE:  Anabella Albrecht                                                                                                                                                       [address]  [facility] [address]                To Whom it May Concern,      Anabella Albrecht has been under my care for Generalized  Hyperhydrosis which has been a life long problem for her and has not been controlled with over the counter medications or  prescription medications which we have tried for several years without success.          Sincerely,        Elis Webster MD

## 2017-07-26 NOTE — TELEPHONE ENCOUNTER
I faxed the letter to the skin renewal dept.  Fax number [phone].  Anabella was notified the new letter was faxed.

## 2017-07-27 ENCOUNTER — OFFICE VISIT (OUTPATIENT)
Dept: OBGYN | Facility: OTHER | Age: 42
End: 2017-07-27
Attending: ADVANCED PRACTICE MIDWIFE
Payer: COMMERCIAL

## 2017-07-27 VITALS
HEIGHT: 66 IN | WEIGHT: 150 LBS | SYSTOLIC BLOOD PRESSURE: 110 MMHG | HEART RATE: 82 BPM | DIASTOLIC BLOOD PRESSURE: 74 MMHG | OXYGEN SATURATION: 99 % | BODY MASS INDEX: 24.11 KG/M2

## 2017-07-27 DIAGNOSIS — Z30.432 ENCOUNTER FOR IUD REMOVAL: Primary | ICD-10-CM

## 2017-07-27 DIAGNOSIS — N93.9 ABNORMAL UTERINE BLEEDING (AUB): ICD-10-CM

## 2017-07-27 PROCEDURE — 58301 REMOVE INTRAUTERINE DEVICE: CPT | Performed by: ADVANCED PRACTICE MIDWIFE

## 2017-07-27 PROCEDURE — 99213 OFFICE O/P EST LOW 20 MIN: CPT | Mod: 25 | Performed by: ADVANCED PRACTICE MIDWIFE

## 2017-07-27 ASSESSMENT — PAIN SCALES - GENERAL: PAINLEVEL: SEVERE PAIN (6)

## 2017-07-27 NOTE — MR AVS SNAPSHOT
After Visit Summary   7/27/2017    Anabella Albrecht    MRN: [id number]           Patient Information     Date Of Birth          1975        Visit Information        Provider Department      7/27/2017 2:30 PM Jim Amanda APRN CNM [facility]        Today's Diagnoses     Encounter for IUD removal    -  1    Abnormal uterine bleeding (AUB)          Care Instructions    Return to office for annual visit in September and as needed,          Follow-ups after your visit        Additional Services     OB/GYN REFERRAL       [facility]     Please be aware that coverage of these services is subject to the terms and limitations of your health insurance plan.  Call member services at your health plan with any benefit or coverage questions.      Please bring the following with you to your appointment:    (1) Any X-Rays, CTs or MRIs which have been performed.  Contact the facility where they were done to arrange for  prior to your scheduled appointment.   (2) List of current medications   (3) This referral request   (4) Any documents/labs given to you for this referral                  Who to contact     If you have questions or need follow up information about today's clinic visit or your schedule please contact [facility] directly at [phone].  Normal or non-critical lab and imaging results will be communicated to you by MyChart, letter or phone within 4 business days after the clinic has received the results. If you do not hear from us within 7 days, please contact the clinic through MyChart or phone. If you have a critical or abnormal lab result, we will notify you by phone as soon as possible.  Submit refill requests through [employer] Outdoort or call your pharmacy and they will forward the refill request to us. Please allow 3 business days for your refill to be completed.          Additional Information About Your  "Visit        MyChart Information     [employer] Dakarhart gives you secure access to your electronic health record. If you see a primary care provider, you can also send messages to your care team and make appointments. If you have questions, please call your primary care clinic.  If you do not have a primary care provider, please call [phone] and they will assist you.        Care EveryWhere ID     This is your Care EveryWhere ID. This could be used by other organizations to access your [place] medical records  [id number]        Your Vitals Were     Pulse Height Pulse Oximetry BMI (Body Mass Index)          82 5' 6\" (1.676 m) 99% 24.21 kg/m2         Blood Pressure from Last 3 Encounters:   07/27/17 110/74   05/31/17 108/72   05/10/17 110/70    Weight from Last 3 Encounters:   07/27/17 150 lb (68 kg)   05/31/17 150 lb (68 kg)   05/10/17 150 lb (68 kg)              We Performed the Following     OB/GYN REFERRAL     REMOVE INTRAUTERINE DEVICE          Today's Medication Changes          These changes are accurate as of: 7/27/17  4:18 PM.  If you have any questions, ask your nurse or doctor.               Stop taking these medicines if you haven't already. Please contact your care team if you have questions.     levonorgestrel 20 MCG/24HR IUD   Commonly known as:  MIRENA   Stopped by:  Jim Amanda APRN AMBROSIO                    Primary Care Provider Office Phone # Fax #    Elis Webster [phone]       [facility] [place] [address]  [place] [address]        Equal Access to Services     [facility] DOMONIQUE AH: Hadii aad ku hadasho Soomaali, waaxda luqadaha, qaybta kaalmada adeegyaclaudia, terra machado adewanda hernandez. So [facility] [phone].    ATENCIÓN: Si habla español, tiene a biggs disposición servicios gratuitos de asistencia lingüística. Llame al [phone].    We comply with applicable federal civil rights laws and [place] laws. We do not discriminate on the basis of race, color, national " origin, age, disability sex, sexual orientation or gender identity.            Thank you!     Thank you for choosing [facility]  for your care. Our goal is always to provide you with excellent care. Hearing back from our patients is one way we can continue to improve our services. Please take a few minutes to complete the written survey that you may receive in the mail after your visit with us. Thank you!             Your Updated Medication List - Protect others around you: Learn how to safely use, store and throw away your medicines at www.disposemymeds.org.      Notice  As of 7/27/2017  4:18 PM    You have not been prescribed any medications.

## 2017-07-27 NOTE — NURSING NOTE
"Chief Complaint   Patient presents with     IUD       Initial /74 (BP Location: Right arm, Patient Position: Chair, Cuff Size: Adult Regular)  Pulse 82  Ht 5' 6\" (1.676 m)  Wt 150 lb (68 kg)  SpO2 99%  BMI 24.21 kg/m2 Estimated body mass index is 24.21 kg/(m^2) as calculated from the following:    Height as of this encounter: 5' 6\" (1.676 m).    Weight as of this encounter: 150 lb (68 kg).  Medication Reconciliation: charles Bolden      "

## 2017-07-27 NOTE — PROGRESS NOTES
"Anabella Albrecht is a 41 year old female  Here today with concerns of headache, breast discomfort, cramping and bleeding, and notable mood changes.  Pt had the Mirena IUD placed on 5/10/17.  Initially she had only spotting and a decrease in breast discomfort.  Denies any sharp pelvic pain. Pt believes these new symptoms are r/t the IUD and she desires to have the IUD removed.  She also requests an OB/GYN  consult for a possible ablation.  Pt voiced a preference for Dr. Eric Sood.    Note:  Pt's  had a vasectomy and testing.      ROS:  C: NEGATIVE for fever, chills, change in weight  R: NEGATIVE for significant cough or SOB  CV: NEGATIVE for chest pain, palpitations or peripheral edema  GI: NEGATIVE for nausea, abdominal pain, heartburn, or change in bowel habits  : NEGATIVE for frequency, dysuria, hematuria, vaginal discharge  P: POSITIVE for changes in mood or affect      O:   /74 (BP Location: Right arm, Patient Position: Chair, Cuff Size: Adult Regular)  Pulse 82  Ht 5' 6\" (1.676 m)  Wt 150 lb (68 kg)  SpO2 99%  BMI 24.21 kg/m2   Pleasant without acute distress  Pelvic:  Normal female genitalia without lesions.  Vagina well rugated without lesions, scant amount of bloody discharge.  Cervix without lesions.  IUD strings visible    A:  Recent symptoms: Headache, breast discomfort, cramping, bleeding, and mood changes.  Mirena IUD placed on 5/10/17  Desires removal of Mirena  Desires referral for ablation    P:  Mirena IUD removal   OB/GYN referral  Take iron supplement OTC when bleeding  RTO for annual visit in September and as needed.    Procedure:  After verbal consent was obtained from the patient, IUD strings were grasped with ring forcep and IUD easily removed intact with minimal patient discomfort noted.  No bleeding noted.      Greater than 20 minutes were spent face to face counseling this patient explained IUD removal procedure, discussed pt's symptoms, possible causes and " alternatives.    Jim Amanda, APRN, CNM

## 2017-08-22 DIAGNOSIS — Z12.31 VISIT FOR SCREENING MAMMOGRAM: Primary | ICD-10-CM

## 2017-08-29 DIAGNOSIS — F41.9 ANXIETY: ICD-10-CM

## 2017-08-29 RX ORDER — FLUOXETINE 40 MG/1
CAPSULE ORAL
Qty: 90 CAPSULE | Refills: 1 | Status: SHIPPED | OUTPATIENT
Start: 2017-08-29 | End: 2018-05-21

## 2017-09-19 ENCOUNTER — OFFICE VISIT (OUTPATIENT)
Dept: OBGYN | Facility: OTHER | Age: 42
End: 2017-09-19
Attending: OBSTETRICS & GYNECOLOGY
Payer: COMMERCIAL

## 2017-09-19 VITALS
WEIGHT: 160 LBS | DIASTOLIC BLOOD PRESSURE: 76 MMHG | HEIGHT: 66 IN | HEART RATE: 77 BPM | OXYGEN SATURATION: 98 % | BODY MASS INDEX: 25.71 KG/M2 | SYSTOLIC BLOOD PRESSURE: 116 MMHG

## 2017-09-19 DIAGNOSIS — N94.6 DYSMENORRHEA: ICD-10-CM

## 2017-09-19 DIAGNOSIS — N92.0 MENORRHAGIA WITH REGULAR CYCLE: ICD-10-CM

## 2017-09-19 DIAGNOSIS — F32.81 PMDD (PREMENSTRUAL DYSPHORIC DISORDER): Primary | ICD-10-CM

## 2017-09-19 DIAGNOSIS — Z12.31 VISIT FOR SCREENING MAMMOGRAM: Primary | ICD-10-CM

## 2017-09-19 PROCEDURE — 77063 BREAST TOMOSYNTHESIS BI: CPT | Mod: TC | Performed by: RADIOLOGY

## 2017-09-19 PROCEDURE — 99214 OFFICE O/P EST MOD 30 MIN: CPT | Performed by: OBSTETRICS & GYNECOLOGY

## 2017-09-19 PROCEDURE — G0202 SCR MAMMO BI INCL CAD: HCPCS | Mod: TC | Performed by: RADIOLOGY

## 2017-09-19 RX ORDER — NORGESTIMATE AND ETHINYL ESTRADIOL 7DAYSX3 28
1 KIT ORAL DAILY
Qty: 84 TABLET | Refills: 3 | Status: SHIPPED | OUTPATIENT
Start: 2017-09-19 | End: 2018-01-18

## 2017-09-19 ASSESSMENT — PAIN SCALES - GENERAL: PAINLEVEL: NO PAIN (0)
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
## 2017-09-19 NOTE — NURSING NOTE
"Chief Complaint   Patient presents with     Consult     Treasure/ abnormal uterine bleeding       Initial /76 (BP Location: Left arm, Cuff Size: Adult Regular)  Pulse 77  Ht 5' 6\" (1.676 m)  Wt 160 lb (72.6 kg)  SpO2 98%  BMI 25.82 kg/m2 Estimated body mass index is 25.82 kg/(m^2) as calculated from the following:    Height as of this encounter: 5' 6\" (1.676 m).    Weight as of this encounter: 160 lb (72.6 kg).  Medication Reconciliation: complete     Princess Fernando      "

## 2017-09-19 NOTE — MR AVS SNAPSHOT
After Visit Summary   9/19/2017    Anabella Albrecht    MRN: [id number]           Patient Information     Date Of Birth          1975        Visit Information        Provider Department      9/19/2017 9:00 AM Eric Sood MD [facility]        Today's Diagnoses     PMDD (premenstrual dysphoric disorder)    -  1    Menorrhagia with regular cycle        Dysmenorrhea          Care Instructions    F/u 3 months          Follow-ups after your visit        Your next 10 appointments already scheduled     Dec 19, 2017  8:45 AM CST   (Arrive by 8:30 AM)   SHORT with Eric Sood MD   [facility] ([facility] )    [address]  [facility] [address]   [phone]              Who to contact     If you have questions or need follow up information about today's clinic visit or your schedule please contact [facility] directly at [phone].  Normal or non-critical lab and imaging results will be communicated to you by MyChart, letter or phone within 4 business days after the clinic has received the results. If you do not hear from us within 7 days, please contact the clinic through [employer] or phone. If you have a critical or abnormal lab result, we will notify you by phone as soon as possible.  Submit refill requests through T[employer]k or call your pharmacy and they will forward the refill request to us. Please allow 3 business days for your refill to be completed.          Additional Information About Your Visit        MyChart Information     T[employer]k gives you secure access to your electronic health record. If you see a primary care provider, you can also send messages to your care team and make appointments. If you have questions, please call your primary care clinic.  If you do not have a primary care provider, please call [phone] and they will assist you.        Care EveryWhere ID     This is your Care EveryWhere ID. This could be used by  "other organizations to access your [place] medical records  [id number]        Your Vitals Were     Pulse Height Pulse Oximetry BMI (Body Mass Index)          77 5' 6\" (1.676 m) 98% 25.82 kg/m2         Blood Pressure from Last 3 Encounters:   09/19/17 116/76   07/27/17 110/74   05/31/17 108/72    Weight from Last 3 Encounters:   09/19/17 160 lb (72.6 kg)   07/27/17 150 lb (68 kg)   05/31/17 150 lb (68 kg)              Today, you had the following     No orders found for display         Today's Medication Changes          These changes are accurate as of: 9/19/17 12:53 PM.  If you have any questions, ask your nurse or doctor.               Start taking these medicines.        Dose/Directions    norgestim-eth estrad triphasic 0.18/0.215/0.25 MG-35 MCG per tablet   Commonly known as:  ORTHO TRI-CYCLEN (28)   Used for:  PMDD (premenstrual dysphoric disorder)   Started by:  Eric Sood MD        Dose:  1 tablet   Take 1 tablet by mouth daily   Quantity:  84 tablet   Refills:  3            Where to get your medicines      These medications were sent to [facility]  [address]  [address] [place] [address]     Phone:  [phone]     norgestim-eth estrad triphasic 0.18/0.215/0.25 MG-35 MCG per tablet                Primary Care Provider Office Phone # Fax #    Elis Webster [phone]       [facility] [place] [address] [place] [address]        Equal Access to Services     [facility] AH: Hadii haim garcia Sojocelin, waaxda luqadaha, qaybta kaalmada herb, terra hernandez. So [facility] [phone].    ATENCIÓN: Si habla español, tiene a biggs disposición servicios gratuitos de asistencia lingüística. Gauravame al [phone].    We comply with applicable federal civil rights laws and [place] laws. We do not discriminate on the basis of race, color, national origin, age, disability sex, sexual orientation or gender " identity.            Thank you!     Thank you for choosing [facility]  for your care. Our goal is always to provide you with excellent care. Hearing back from our patients is one way we can continue to improve our services. Please take a few minutes to complete the written survey that you may receive in the mail after your visit with us. Thank you!             Your Updated Medication List - Protect others around you: Learn how to safely use, store and throw away your medicines at www.disposemymeds.org.          This list is accurate as of: 9/19/17 12:53 PM.  Always use your most recent med list.                   Brand Name Dispense Instructions for use Diagnosis    FLUoxetine 40 MG capsule    PROzac    90 capsule    TAKE 1 CAPSULE BY MOUTH EVERY DAY    Anxiety       norgestim-eth estrad triphasic 0.18/0.215/0.25 MG-35 MCG per tablet    ORTHO TRI-CYCLEN (28)    84 tablet    Take 1 tablet by mouth daily    PMDD (premenstrual dysphoric disorder)

## 2017-10-23 ENCOUNTER — OFFICE VISIT (OUTPATIENT)
Dept: FAMILY MEDICINE | Facility: OTHER | Age: 42
End: 2017-10-23
Attending: FAMILY MEDICINE
Payer: COMMERCIAL

## 2017-10-23 ENCOUNTER — TELEPHONE (OUTPATIENT)
Dept: FAMILY MEDICINE | Facility: OTHER | Age: 42
End: 2017-10-23

## 2017-10-23 VITALS
DIASTOLIC BLOOD PRESSURE: 60 MMHG | OXYGEN SATURATION: 99 % | HEIGHT: 66 IN | WEIGHT: 169 LBS | TEMPERATURE: 97.9 F | BODY MASS INDEX: 27.16 KG/M2 | SYSTOLIC BLOOD PRESSURE: 100 MMHG | HEART RATE: 68 BPM | RESPIRATION RATE: 16 BRPM

## 2017-10-23 DIAGNOSIS — L03.811 CELLULITIS OF HEAD EXCEPT FACE: Primary | ICD-10-CM

## 2017-10-23 PROCEDURE — 99213 OFFICE O/P EST LOW 20 MIN: CPT | Performed by: FAMILY MEDICINE

## 2017-10-23 RX ORDER — MUPIROCIN 20 MG/G
OINTMENT TOPICAL 3 TIMES DAILY
Qty: 22 G | Refills: 1 | Status: SHIPPED | OUTPATIENT
Start: 2017-10-23 | End: 2017-10-28

## 2017-10-23 ASSESSMENT — ANXIETY QUESTIONNAIRES
2. NOT BEING ABLE TO STOP OR CONTROL WORRYING: NOT AT ALL
7. FEELING AFRAID AS IF SOMETHING AWFUL MIGHT HAPPEN: NOT AT ALL
IF YOU CHECKED OFF ANY PROBLEMS ON THIS QUESTIONNAIRE, HOW DIFFICULT HAVE THESE PROBLEMS MADE IT FOR YOU TO DO YOUR WORK, TAKE CARE OF THINGS AT HOME, OR GET ALONG WITH OTHER PEOPLE: NOT DIFFICULT AT ALL
1. FEELING NERVOUS, ANXIOUS, OR ON EDGE: NOT AT ALL
3. WORRYING TOO MUCH ABOUT DIFFERENT THINGS: NOT AT ALL
5. BEING SO RESTLESS THAT IT IS HARD TO SIT STILL: NOT AT ALL
GAD7 TOTAL SCORE: 1
6. BECOMING EASILY ANNOYED OR IRRITABLE: SEVERAL DAYS

## 2017-10-23 ASSESSMENT — PATIENT HEALTH QUESTIONNAIRE - PHQ9
SUM OF ALL RESPONSES TO PHQ QUESTIONS 1-9: 4
5. POOR APPETITE OR OVEREATING: NOT AT ALL

## 2017-10-23 ASSESSMENT — PAIN SCALES - GENERAL: PAINLEVEL: EXTREME PAIN (9)

## 2017-10-23 NOTE — TELEPHONE ENCOUNTER
9:52 AM    Reason for Call: OVERBOOK    Patient is having the following symptoms: Patient has been having ear pain for 3 to 4 days    The patient is requesting an appointment for today anytime with Dr. Elis Webster.    Was an appointment offered for this call? No, patient wants to get in with PCP today, there was nothing avaiable  If yes : Appointment type              Date    Preferred method for responding to this message: Telephone Call     What is your phone number ?   [phone]    If we cannot reach you directly, may we leave a detailed response at the number you provided? Yes    Can this message wait until your PCP/provider returns, if unavailable today? PCP is in    Harmony Snyder
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
## 2017-10-23 NOTE — MR AVS SNAPSHOT
"              After Visit Summary   10/23/2017    Anabella Albrecht    MRN: [id number]           Patient Information     Date Of Birth          1975        Visit Information        Provider Department      10/23/2017 3:45 PM Elis [facility] MD [facility]        Today's Diagnoses     Cellulitis of head except face    -  1       Follow-ups after your visit        Who to contact     If you have questions or need follow up information about today's clinic visit or your schedule please contact [facility] directly at [phone].  Normal or non-critical lab and imaging results will be communicated to you by MyChart, letter or phone within 4 business days after the clinic has received the results. If you do not hear from us within 7 days, please contact the clinic through [employer] or phone. If you have a critical or abnormal lab result, we will notify you by phone as soon as possible.  Submit refill requests through [employer] or call your pharmacy and they will forward the refill request to us. Please allow 3 business days for your refill to be completed.          Additional Information About Your Visit        MyChart Information     [employer] gives you secure access to your electronic health record. If you see a primary care provider, you can also send messages to your care team and make appointments. If you have questions, please call your primary care clinic.  If you do not have a primary care provider, please call [phone] and they will assist you.        Care EveryWhere ID     This is your Care EveryWhere ID. This could be used by other organizations to access your [place] medical records  [id number]        Your Vitals Were     Pulse Temperature Respirations Height Pulse Oximetry BMI (Body Mass Index)    68 97.9  F (36.6  C) (Tympanic) 16 5' 6\" (1.676 m) 99% 27.28 kg/m2       Blood Pressure from Last 3 Encounters:   10/23/17 100/60   09/19/17 116/76   07/27/17 110/74    Weight from " Last 3 Encounters:   10/23/17 169 lb (76.7 kg)   09/19/17 160 lb (72.6 kg)   07/27/17 150 lb (68 kg)              Today, you had the following     No orders found for display         Today's Medication Changes          These changes are accurate as of: 10/23/17  4:54 PM.  If you have any questions, ask your nurse or doctor.               Start taking these medicines.        Dose/Directions    amoxicillin-clavulanate 875-125 MG per tablet   Commonly known as:  AUGMENTIN   Used for:  Cellulitis of head except face   Started by:  Elis Webster MD        Dose:  1 tablet   Take 1 tablet by mouth 2 times daily   Quantity:  20 tablet   Refills:  0       mupirocin 2 % ointment   Commonly known as:  BACTROBAN   Used for:  Cellulitis of head except face   Started by:  Elis Webster MD        Apply topically 3 times daily for 5 days   Quantity:  22 g   Refills:  1            Where to get your medicines      These medications were sent to [facility] [place] - [address]  [address] [place] [address]     Phone:  [phone]     amoxicillin-clavulanate 875-125 MG per tablet    mupirocin 2 % ointment                Primary Care Provider Office Phone # Fax #    Elis Webster [phone]       [facility] [address]  [place] [address]        Equal Access to Services     [facility] AH: Hadii aad ku hadasho Soomaali, waaxda luqadaha, qaybta kaalmada adeegyada, terra turnerin haylily acuña . So [facility] [phone].    ATENCIÓN: Si habla español, tiene a biggs disposición servicios gratuitos de asistencia lingüística. Llame al [phone].    We comply with applicable federal civil rights laws and [place] laws. We do not discriminate on the basis of race, color, national origin, age, disability, sex, sexual orientation, or gender identity.            Thank you!     Thank you for choosing [facility]  for your care. Our goal is always to provide  you with excellent care. Hearing back from our patients is one way we can continue to improve our services. Please take a few minutes to complete the written survey that you may receive in the mail after your visit with us. Thank you!             Your Updated Medication List - Protect others around you: Learn how to safely use, store and throw away your medicines at www.disposemymeds.org.          This list is accurate as of: 10/23/17  4:54 PM.  Always use your most recent med list.                   Brand Name Dispense Instructions for use Diagnosis    amoxicillin-clavulanate 875-125 MG per tablet    AUGMENTIN    20 tablet    Take 1 tablet by mouth 2 times daily    Cellulitis of head except face       FLUoxetine 40 MG capsule    PROzac    90 capsule    TAKE 1 CAPSULE BY MOUTH EVERY DAY    Anxiety       mupirocin 2 % ointment    BACTROBAN    22 g    Apply topically 3 times daily for 5 days    Cellulitis of head except face       norgestim-eth estrad triphasic 0.18/0.215/0.25 MG-35 MCG per tablet    ORTHO TRI-CYCLEN (28)    84 tablet    Take 1 tablet by mouth daily    PMDD (premenstrual dysphoric disorder)

## 2017-10-23 NOTE — NURSING NOTE
"Chief Complaint   Patient presents with     Ear Problem     Having sweeling, reddnes, crusting areas on right upper ear/outer ear. Started about 2 days ago.       Initial /60  Pulse 68  Temp 97.9  F (36.6  C) (Tympanic)  Resp 16  Ht 5' 6\" (1.676 m)  Wt 169 lb (76.7 kg)  SpO2 99%  BMI 27.28 kg/m2 Estimated body mass index is 27.28 kg/(m^2) as calculated from the following:    Height as of this encounter: 5' 6\" (1.676 m).    Weight as of this encounter: 169 lb (76.7 kg).  Medication Reconciliation: complete   Ana Ballesteros      "

## 2017-10-23 NOTE — TELEPHONE ENCOUNTER
Please schedule patient for date/time: can work into our schedule today at 3:45, arrival time 3:30.    Have patient go to ER/Urgent Care Center. Urgent Care hours are 9:30 am to 8 pm, open 7 days a week. No.    Provider will call patient.No.    Other:

## 2017-10-24 ASSESSMENT — ANXIETY QUESTIONNAIRES: GAD7 TOTAL SCORE: 1

## 2018-01-18 ENCOUNTER — TELEPHONE (OUTPATIENT)
Dept: FAMILY MEDICINE | Facility: OTHER | Age: 43
End: 2018-01-18

## 2018-01-18 ENCOUNTER — OFFICE VISIT (OUTPATIENT)
Dept: FAMILY MEDICINE | Facility: OTHER | Age: 43
End: 2018-01-18
Attending: FAMILY MEDICINE
Payer: COMMERCIAL

## 2018-01-18 VITALS
DIASTOLIC BLOOD PRESSURE: 68 MMHG | SYSTOLIC BLOOD PRESSURE: 114 MMHG | HEIGHT: 66 IN | HEART RATE: 80 BPM | RESPIRATION RATE: 20 BRPM | OXYGEN SATURATION: 97 % | BODY MASS INDEX: 27 KG/M2 | WEIGHT: 168 LBS

## 2018-01-18 DIAGNOSIS — M54.16 LUMBAR RADICULAR PAIN: Primary | ICD-10-CM

## 2018-01-18 PROCEDURE — 99213 OFFICE O/P EST LOW 20 MIN: CPT | Performed by: FAMILY MEDICINE

## 2018-01-18 RX ORDER — NABUMETONE 500 MG/1
500-1000 TABLET, FILM COATED ORAL 2 TIMES DAILY PRN
Qty: 60 TABLET | Refills: 1 | Status: SHIPPED | OUTPATIENT
Start: 2018-01-18 | End: 2018-05-21

## 2018-01-18 RX ORDER — HYDROCODONE BITARTRATE AND ACETAMINOPHEN 5; 325 MG/1; MG/1
1-2 TABLET ORAL EVERY 6 HOURS PRN
Qty: 20 TABLET | Refills: 0 | Status: SHIPPED | OUTPATIENT
Start: 2018-01-18 | End: 2018-05-21

## 2018-01-18 RX ORDER — CYCLOBENZAPRINE HCL 10 MG
10 TABLET ORAL 3 TIMES DAILY PRN
Qty: 30 TABLET | Refills: 1 | Status: SHIPPED | OUTPATIENT
Start: 2018-01-18 | End: 2018-05-21

## 2018-01-18 ASSESSMENT — ANXIETY QUESTIONNAIRES
GAD7 TOTAL SCORE: 1
3. WORRYING TOO MUCH ABOUT DIFFERENT THINGS: NOT AT ALL
2. NOT BEING ABLE TO STOP OR CONTROL WORRYING: NOT AT ALL
IF YOU CHECKED OFF ANY PROBLEMS ON THIS QUESTIONNAIRE, HOW DIFFICULT HAVE THESE PROBLEMS MADE IT FOR YOU TO DO YOUR WORK, TAKE CARE OF THINGS AT HOME, OR GET ALONG WITH OTHER PEOPLE: NOT DIFFICULT AT ALL
6. BECOMING EASILY ANNOYED OR IRRITABLE: SEVERAL DAYS
1. FEELING NERVOUS, ANXIOUS, OR ON EDGE: NOT AT ALL
5. BEING SO RESTLESS THAT IT IS HARD TO SIT STILL: NOT AT ALL
7. FEELING AFRAID AS IF SOMETHING AWFUL MIGHT HAPPEN: NOT AT ALL
4. TROUBLE RELAXING: NOT AT ALL

## 2018-01-18 ASSESSMENT — PATIENT HEALTH QUESTIONNAIRE - PHQ9: SUM OF ALL RESPONSES TO PHQ QUESTIONS 1-9: 2

## 2018-01-18 ASSESSMENT — PAIN SCALES - GENERAL: PAINLEVEL: EXTREME PAIN (8)

## 2018-01-18 NOTE — TELEPHONE ENCOUNTER
Please schedule patient for date/time: can see today at 11:30, arrival time 11:15    Have patient go to ER/Urgent Care Center. Urgent Care hours are 9:30 am to 8 pm, open 7 days a week. No.    Provider will call patient.No.    Other:

## 2018-01-18 NOTE — TELEPHONE ENCOUNTER
8:37 AM    Reason for Call: OVERBOOK    Patient is having the following symptoms: back pain for 3 days.    The patient is requesting an appointment for 01/18/18 with Dr.Susan Webster.    Was an appointment offered for this call? No  If yes : Appointment type              Date    Preferred method for responding to this message: Telephone Call  What is your phone number ?    If we cannot reach you directly, may we leave a detailed response at the number you provided? Yes    Can this message wait until your PCP/provider returns, if unavailable today? Not applicable, PCP is in     [facility]

## 2018-01-18 NOTE — NURSING NOTE
"Chief Complaint   Patient presents with     Back Pain     Pulled lower back muscle 2 days ago. Rates pain 8/10.       Initial /68 (BP Location: Left arm)  Pulse 80  Resp 20  Ht 5' 6\" (1.676 m)  Wt 168 lb (76.2 kg)  SpO2 97%  Breastfeeding? No  BMI 27.12 kg/m2 Estimated body mass index is 27.12 kg/(m^2) as calculated from the following:    Height as of this encounter: 5' 6\" (1.676 m).    Weight as of this encounter: 168 lb (76.2 kg).  Medication Reconciliation: complete   Cindy Capellan      "

## 2018-01-18 NOTE — MR AVS SNAPSHOT
"              After Visit Summary   1/18/2018    Anabella Albrecht    MRN: [id number]           Patient Information     Date Of Birth          1975        Visit Information        Provider Department      1/18/2018 11:15 AM Elis Webster MD [facility]        Today's Diagnoses     Lumbar radicular pain    -  1       Follow-ups after your visit        Who to contact     If you have questions or need follow up information about today's clinic visit or your schedule please contact [facility] directly at [phone].  Normal or non-critical lab and imaging results will be communicated to you by MyChart, letter or phone within 4 business days after the clinic has received the results. If you do not hear from us within 7 days, please contact the clinic through "[email or web address]t or phone. If you have a critical or abnormal lab result, we will notify you by phone as soon as possible.  Submit refill requests through [employer] or call your pharmacy and they will forward the refill request to us. Please allow 3 business days for your refill to be completed.          Additional Information About Your Visit        MyChart Information     [employer] gives you secure access to your electronic health record. If you see a primary care provider, you can also send messages to your care team and make appointments. If you have questions, please call your primary care clinic.  If you do not have a primary care provider, please call [phone] and they will assist you.        Care EveryWhere ID     This is your Care EveryWhere ID. This could be used by other organizations to access your [facility] medical records  [id number]        Your Vitals Were     Pulse Respirations Height Pulse Oximetry Breastfeeding? BMI (Body Mass Index)    80 20 5' 6\" (1.676 m) 97% No 27.12 kg/m2       Blood Pressure from Last 3 Encounters:   01/18/18 114/68   10/23/17 100/60   09/19/17 116/76    Weight from Last 3 Encounters:   01/18/18 168 lb " (76.2 kg)   10/23/17 169 lb (76.7 kg)   09/19/17 160 lb (72.6 kg)              Today, you had the following     No orders found for display         Today's Medication Changes          These changes are accurate as of: 1/18/18 11:56 AM.  If you have any questions, ask your nurse or doctor.               Start taking these medicines.        Dose/Directions    cyclobenzaprine 10 MG tablet   Commonly known as:  FLEXERIL   Used for:  Lumbar radicular pain   Started by:  Elis Webster MD        Dose:  10 mg   Take 1 tablet (10 mg) by mouth 3 times daily as needed for muscle spasms   Quantity:  30 tablet   Refills:  1       HYDROcodone-acetaminophen 5-325 MG per tablet   Commonly known as:  NORCO   Used for:  Lumbar radicular pain   Started by:  Elis Webster MD        Dose:  1-2 tablet   Take 1-2 tablets by mouth every 6 hours as needed for moderate to severe pain maximum 4 tablet(s) per day   Quantity:  20 tablet   Refills:  0       nabumetone 500 MG tablet   Commonly known as:  RELAFEN   Used for:  Lumbar radicular pain   Started by:  Elis Webster MD        Dose:  500-1000 mg   Take 1-2 tablets (500-1,000 mg) by mouth 2 times daily as needed for moderate pain   Quantity:  60 tablet   Refills:  1            Where to get your medicines      These medications were sent to [facility]  [address]  [facility] [place] [address]     Phone:  [phone]     cyclobenzaprine 10 MG tablet    nabumetone 500 MG tablet         Some of these will need a paper prescription and others can be bought over the counter.  Ask your nurse if you have questions.     Bring a paper prescription for each of these medications     HYDROcodone-acetaminophen 5-325 MG per tablet                Primary Care Provider Office Phone # Fax #    Elis Webster [phone]       [facility] [address] [place] [address] [facility] [address]        Equal Access to Services     CEDRIC YOUSSEF AH: Sandy whitmore  jewel Meyer, waguilleda luqadaha, qaybta kaalcorinne estevez, terra yuein hayaajaime mariwanda jafelicita laDarialily mary. So [facility] [phone].    ATENCIÓN: Si júniorla yoli, tiene a biggs disposición servicios gratuitos de asistencia lingüística. Montserrat al [phone].    We comply with applicable federal civil rights laws and [place] laws. We do not discriminate on the basis of race, color, national origin, age, disability, sex, sexual orientation, or gender identity.            Thank you!     Thank you for choosing [facility]  [facility]. Our goal is always to provide you with excellent care. Hearing back from our patients is one way we can continue to improve our services. Please take a few minutes to complete the written survey that you may receive in the mail after your visit with us. Thank you!             Your Updated Medication List - Protect others around you: Learn how to safely use, store and throw away your medicines at www.disposemymeds.org.          This list is accurate as of: 1/18/18 11:56 AM.  Always use your most recent med list.                   Brand Name Dispense Instructions for use Diagnosis    cyclobenzaprine 10 MG tablet    FLEXERIL    30 tablet    Take 1 tablet (10 mg) by mouth 3 times daily as needed for muscle spasms    Lumbar radicular pain       FLUoxetine 40 MG capsule    PROzac    90 capsule    TAKE 1 CAPSULE BY MOUTH EVERY DAY    Anxiety       HYDROcodone-acetaminophen 5-325 MG per tablet    NORCO    20 tablet    Take 1-2 tablets by mouth every 6 hours as needed for moderate to severe pain maximum 4 tablet(s) per day    Lumbar radicular pain       nabumetone 500 MG tablet    RELAFEN    60 tablet    Take 1-2 tablets (500-1,000 mg) by mouth 2 times daily as needed for moderate pain    Lumbar radicular pain

## 2018-01-19 ASSESSMENT — ANXIETY QUESTIONNAIRES: GAD7 TOTAL SCORE: 1
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
## 2018-04-10 ENCOUNTER — OFFICE VISIT (OUTPATIENT)
Dept: OBGYN | Facility: OTHER | Age: 43
End: 2018-04-10
Attending: OBSTETRICS & GYNECOLOGY
Payer: COMMERCIAL

## 2018-04-10 VITALS
BODY MASS INDEX: 27.32 KG/M2 | OXYGEN SATURATION: 99 % | SYSTOLIC BLOOD PRESSURE: 106 MMHG | HEART RATE: 86 BPM | HEIGHT: 66 IN | WEIGHT: 170 LBS | DIASTOLIC BLOOD PRESSURE: 61 MMHG

## 2018-04-10 DIAGNOSIS — N92.0 MENORRHAGIA WITH REGULAR CYCLE: ICD-10-CM

## 2018-04-10 DIAGNOSIS — N94.6 DYSMENORRHEA: Primary | ICD-10-CM

## 2018-04-10 DIAGNOSIS — F32.81 PMDD (PREMENSTRUAL DYSPHORIC DISORDER): ICD-10-CM

## 2018-04-10 DIAGNOSIS — N80.9 ENDOMETRIOSIS: ICD-10-CM

## 2018-04-10 PROCEDURE — 99213 OFFICE O/P EST LOW 20 MIN: CPT | Performed by: OBSTETRICS & GYNECOLOGY

## 2018-04-10 ASSESSMENT — PAIN SCALES - GENERAL: PAINLEVEL: EXTREME PAIN (9)

## 2018-04-10 NOTE — MR AVS SNAPSHOT
After Visit Summary   4/10/2018    Anabella Albrecht    MRN: [id number]           Patient Information     Date Of Birth          1975        Visit Information        Provider Department      4/10/2018 10:00 AM [facility]        Today's Diagnoses     Dysmenorrhea    -  1    Menorrhagia with regular cycle        PMDD (premenstrual dysphoric disorder)        Endometriosis          Care Instructions    F/u 3 motnhs          Follow-ups after your visit        Your next 10 appointments already scheduled     Jul 10, 2018 10:00 AM CDT   (Arrive by 9:45 AM)   SHORT with Eric Sood MD   [facility] [place] ([facility] [place] )    [address]  [facility] [address]   [phone]              Who to contact     If you have questions or need follow up information about today's clinic visit or your schedule please contact [facility] directly at [phone].  Normal or non-critical lab and imaging results will be communicated to you by MyChart, letter or phone within 4 business days after the clinic has received the results. If you do not hear from us within 7 days, please contact the clinic through "[employer] or phone. If you have a critical or abnormal lab result, we will notify you by phone as soon as possible.  Submit refill requests through [employer] or call your pharmacy and they will forward the refill request to us. Please allow 3 business days for your refill to be completed.          Additional Information About Your Visit        [employer] Information     [employer] gives you secure access to your electronic health record. If you see a primary care provider, you can also send messages to your care team and make appointments. If you have questions, please call your primary care clinic.  If you do not have a primary care provider, please call [phone] and they will assist you.        Care EveryWhere ID     This is your Care EveryWhere ID.  "This could be used by other organizations to access your [facility] medical records  [id number]        Your Vitals Were     Pulse Height Pulse Oximetry BMI (Body Mass Index)          86 5' 6\" (1.676 m) 99% 27.44 kg/m2         Blood Pressure from Last 3 Encounters:   04/10/18 106/61   01/18/18 114/68   10/23/17 100/60    Weight from Last 3 Encounters:   04/10/18 170 lb (77.1 kg)   01/18/18 168 lb (76.2 kg)   10/23/17 169 lb (76.7 kg)              Today, you had the following     No orders found for display       Primary Care Provider Office Phone # Fax #    Elis Webster [phone] [address] [id number]        Equal Access to Services     [facility] DOMONIQUE : Sandy hartmanno Sojocelin, waaxda luqadaha, qaybta kaalmada adewandayaclaudia, terra acuña . So [facility] [phone].    ATENCIÓN: Si habla español, tiene a biggs disposición servicios gratuitos de asistencia lingüística. [facility] [phone].    We comply with applicable federal civil rights laws and [place] laws. We do not discriminate on the basis of race, color, national origin, age, disability, sex, sexual orientation, or gender identity.            Thank you!     Thank you for choosing [facility]  for your care. Our goal is always to provide you with excellent care. Hearing back from our patients is one way we can continue to improve our services. Please take a few minutes to complete the written survey that you may receive in the mail after your visit with us. Thank you!             Your Updated Medication List - Protect others around you: Learn how to safely use, store and throw away your medicines at www.disposemymeds.org.          This list is accurate as of 4/10/18 12:25 PM.  Always use your most recent med list.                   Brand Name Dispense Instructions for use Diagnosis    cyclobenzaprine 10 MG tablet    FLEXERIL    30 tablet    Take 1 tablet (10 mg) by mouth 3 times " daily as needed for muscle spasms    Lumbar radicular pain       FLUoxetine 40 MG capsule    PROzac    90 capsule    TAKE 1 CAPSULE BY MOUTH EVERY DAY    Anxiety       HYDROcodone-acetaminophen 5-325 MG per tablet    NORCO    20 tablet    Take 1-2 tablets by mouth every 6 hours as needed for moderate to severe pain maximum 4 tablet(s) per day    Lumbar radicular pain       nabumetone 500 MG tablet    RELAFEN    60 tablet    Take 1-2 tablets (500-1,000 mg) by mouth 2 times daily as needed for moderate pain    Lumbar radicular pain

## 2018-04-10 NOTE — NURSING NOTE
"Chief Complaint   Patient presents with     RECHECK     3 month follow up menorrhagia and dysmenorrhea       Initial /61 (BP Location: Left arm, Cuff Size: Adult Regular)  Pulse 86  Ht 5' 6\" (1.676 m)  Wt 170 lb (77.1 kg)  SpO2 99%  BMI 27.44 kg/m2 Estimated body mass index is 27.44 kg/(m^2) as calculated from the following:    Height as of this encounter: 5' 6\" (1.676 m).    Weight as of this encounter: 170 lb (77.1 kg).  Medication Reconciliation: charles Fernando      "
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
## 2018-05-21 ENCOUNTER — HOSPITAL ENCOUNTER (EMERGENCY)
Facility: HOSPITAL | Age: 43
Discharge: HOME | End: 2018-05-21
Attending: NURSE PRACTITIONER | Admitting: NURSE PRACTITIONER
Payer: COMMERCIAL

## 2018-05-21 ENCOUNTER — TELEPHONE (OUTPATIENT)
Dept: FAMILY MEDICINE | Facility: OTHER | Age: 43
End: 2018-05-21

## 2018-05-21 VITALS
TEMPERATURE: 96.8 F | SYSTOLIC BLOOD PRESSURE: 114 MMHG | RESPIRATION RATE: 16 BRPM | DIASTOLIC BLOOD PRESSURE: 77 MMHG | HEART RATE: 84 BPM | OXYGEN SATURATION: 98 %

## 2018-05-21 DIAGNOSIS — H65.91 RIGHT NONSUPPURATIVE OTITIS MEDIA: ICD-10-CM

## 2018-05-21 PROCEDURE — 99213 OFFICE O/P EST LOW 20 MIN: CPT | Performed by: NURSE PRACTITIONER

## 2018-05-21 PROCEDURE — G0463 HOSPITAL OUTPT CLINIC VISIT: HCPCS

## 2018-05-21 RX ORDER — IBUPROFEN 800 MG/1
800 TABLET, FILM COATED ORAL EVERY 8 HOURS PRN
Qty: 20 TABLET | Refills: 0 | Status: SHIPPED | OUTPATIENT
Start: 2018-05-21 | End: 2018-05-29

## 2018-05-21 RX ORDER — AMOXICILLIN 875 MG
875 TABLET ORAL 2 TIMES DAILY
Qty: 20 TABLET | Refills: 0 | Status: SHIPPED | OUTPATIENT
Start: 2018-05-21 | End: 2018-05-24

## 2018-05-21 RX ORDER — FLUTICASONE PROPIONATE 50 MCG
2 SPRAY, SUSPENSION (ML) NASAL DAILY
Qty: 1 BOTTLE | Refills: 0 | Status: SHIPPED | OUTPATIENT
Start: 2018-05-21 | End: 2018-09-24

## 2018-05-21 RX ORDER — CETIRIZINE HYDROCHLORIDE 10 MG/1
10 TABLET ORAL DAILY
Qty: 30 TABLET | Refills: 0 | Status: SHIPPED | OUTPATIENT
Start: 2018-05-21 | End: 2018-06-20

## 2018-05-21 NOTE — TELEPHONE ENCOUNTER
10:59 AM    Reason for Call: Phone Call    Description: Pt is faxing in some paperwork that her insurance asks is completed by Dr. Webster, but she also requests a call back. Thank you!    Was an appointment offered for this call? No  If yes : Appointment type              Date    Preferred method for responding to this message: Telephone Call  What is your phone number ? [phone]    If we cannot reach you directly, may we leave a detailed response at the number you provided? Yes    Can this message wait until your PCP/provider returns, if available today? Not applicable, provider is in    Bhavin Craig
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
## 2018-05-21 NOTE — ED AVS SNAPSHOT
[facility]    [address] [address]    Phone:  [phone]                                       Anabella Albrecht   MRN: [id number]    Department:  [facility]   Date of Visit:  5/21/2018           After Visit Summary Signature Page     I have received my discharge instructions, and my questions have been answered. I have discussed any challenges I see with this plan with the nurse or doctor.    ..........................................................................................................................................  Patient/Patient Representative Signature      ..........................................................................................................................................  Patient Representative Print Name and Relationship to Patient    ..................................................               ................................................  Date                                            Time    ..........................................................................................................................................  Reviewed by Signature/Title    ...................................................              ..............................................  Date                                                            Time

## 2018-05-21 NOTE — DISCHARGE INSTRUCTIONS
Take antibiotics as ordered.   Eat a yogurt daily while taking antibiotics.   Take Zyrtec 10 mg daily for 10 days.   Use Flonase 2 sprays to each nostril daily for 10 days.   Take Ibuprofen and/or tylenol as needed as directed.   Follow up with PCP with any increase in symptoms or concerns.   Return to urgent care or emergency department with any increase in symptoms or concerns.

## 2018-05-21 NOTE — ED AVS SNAPSHOT
[facility]    [address]    [facility] [address]    Phone:  [phone]                                       Anabella Albrecht   MRN: [id number]    Department:  [facility]   Date of Visit:  5/21/2018           Patient Information     Date Of Birth          1975        Your diagnoses for this visit were:     Right nonsuppurative otitis media        You were seen by Lyndsey Ruiz NP.      Follow-up Information     Follow up with Elis Webster MD.    Specialty:  Family Practice    Why:  As needed, If symptoms worsen    Contact information:    [address]  [place] [address]  [phone]          Follow up with [facility].    Specialty:  EMERGENCY MEDICINE    Why:  As needed, If symptoms worsen    Contact information:    [address]  [place] [address] [phone]    Additional information:    From [facility]: Take [address]. Turn left at [address]. Turn left at the first stoplight on [address]. At the first stop sign, take a right onto [address]. Take a left into the parking lot and continue through until you reach the North enterance of the building.       From [place]: Take [address]. Take the [address] ramp towards [place]. Turn left onto [address]. Take a slight right onto [address]. Turn left at the first stoplight on [address]. At the first stop sign, take a right onto [address]. Take a left into the parking lot and continue through until you reach the North enterance of the building.       From [place]: Take [address]. Take a right at [address]. At the first stop sign, take a right onto [address]. Take a left into the parking lot and continue through until you reach the North enterance of the building.         Discharge Instructions       Take antibiotics as ordered.   Eat a yogurt daily while taking antibiotics.   Take  Zyrtec 10 mg daily for 10 days.   Use Flonase 2 sprays to each nostril daily for 10 days.   Take Ibuprofen and/or tylenol as needed as directed.   Follow up with PCP with any increase in symptoms or concerns.   Return to urgent care or emergency department with any increase in symptoms or concerns.     Discharge References/Attachments     OTITIS MEDIA (MIDDLE-EAR INFECTION) IN ADULTS (ENGLISH)      Your next 10 appointments already scheduled     Jul 10, 2018 10:00 AM CDT   (Arrive by 9:45 AM)   SHORT with Eric Sood MD   [facility] ([facility]    [address] [place] [id number] [phone]                 Review of your medicines      START taking        Dose / Directions Last dose taken    amoxicillin 875 MG tablet   Commonly known as:  AMOXIL   Dose:  875 mg   Quantity:  20 tablet        Take 1 tablet (875 mg) by mouth 2 times daily for 10 days   Refills:  0        cetirizine 10 MG tablet   Commonly known as:  zyrTEC   Dose:  10 mg   Quantity:  30 tablet        Take 1 tablet (10 mg) by mouth daily   Refills:  0        fluticasone 50 MCG/ACT spray   Commonly known as:  FLONASE   Dose:  2 spray   Quantity:  1 Bottle        Spray 2 sprays into both nostrils daily   Refills:  0        ibuprofen 800 MG tablet   Commonly known as:  ADVIL/MOTRIN   Dose:  800 mg   Quantity:  20 tablet        Take 1 tablet (800 mg) by mouth every 8 hours as needed for moderate pain   Refills:  0                Prescriptions were sent or printed at these locations (4 Prescriptions)                   [facility] [place]  [address]   [address] [place] [address]    Telephone:  [phone]   Fax:  [phone]   Hours:                  E-Prescribed (4 of 4)         amoxicillin (AMOXIL) 875 MG tablet               cetirizine (ZYRTEC) 10 MG tablet               fluticasone (FLONASE) 50 MCG/ACT spray               ibuprofen (ADVIL/MOTRIN) 800 MG tablet                 Orders Needing Specimen Collection     None      Pending Results     No orders found from 5/19/2018 to 5/22/2018.            Pending Culture Results     No orders found from 5/19/2018 to 5/22/2018.            Thank you for choosing [facility]       Thank you for choosing [facility] for your care. Our goal is always to provide you with excellent care. Hearing back from our patients is one way we can continue to improve our services. Please take a few minutes to complete the written survey that you may receive in the mail after you visit with us. Thank you!        [employer] Information     "[employer] gives you secure access to your electronic health record. If you see a primary care provider, you can also send messages to your care team and make appointments. If you have questions, please call your primary care clinic.  If you do not have a primary care provider, please call [phone] and they will assist you.        Care EveryWhere ID     This is your Care EveryWhere ID. This could be used by other organizations to access your [facility] medical records  [id number]        Equal Access to Services     CEDRIC YOUSSEF : Hadii haim hartmanno Betsy, waaxda luondina, qaybta kaalmaclaudia estevez, terra acuña . So [facility] [phone].    ATENCIÓN: Si habla español, tiene a biggs disposición servicios gratuitos de asistencia lingüística. Llame al [phone].    We comply with applicable federal civil rights laws and [place] laws. We do not discriminate on the basis of race, color, national origin, age, disability, sex, sexual orientation, or gender identity.            After Visit Summary       This is your record. Keep this with you and show to your community pharmacist(s) and doctor(s) at your next visit.

## 2018-05-21 NOTE — TELEPHONE ENCOUNTER
Received forms from patient and called her back she is trying to do an appeal for her insurance as they are denying her for PT and from her upcoming  Mammoplasty. They need medical notes from her physician stating she has had at least 6 months of shoulder, neck or back pain that is not responsive to at least 6 weeks of conservative therapy. Please advise.

## 2018-05-22 NOTE — TELEPHONE ENCOUNTER
Pt called to check on status of this. Wondering if you were able to get info? She gave a fax number to where you can send info - [phone]. Please call pt back at [phone]

## 2018-05-22 NOTE — TELEPHONE ENCOUNTER
I think we should see her. They are requiring several pieces of documentation such as back pain not relieved by 6 weeks of PT, shoulder grooving, or neurologic symptoms, plus a normal mammogram and the weight of tissue to be removed which DR Payton has to calculate. She has had the mammogram done 8/2017. She should have all lined up because once they decide an appeal it can't be redone  Please get notes from Dr Payton as well.

## 2018-05-24 ENCOUNTER — TELEPHONE (OUTPATIENT)
Dept: FAMILY MEDICINE | Facility: OTHER | Age: 43
End: 2018-05-24

## 2018-05-24 ENCOUNTER — OFFICE VISIT (OUTPATIENT)
Dept: FAMILY MEDICINE | Facility: OTHER | Age: 43
End: 2018-05-24
Attending: FAMILY MEDICINE
Payer: COMMERCIAL

## 2018-05-24 VITALS
RESPIRATION RATE: 18 BRPM | BODY MASS INDEX: 27.32 KG/M2 | WEIGHT: 170 LBS | TEMPERATURE: 97.8 F | SYSTOLIC BLOOD PRESSURE: 104 MMHG | HEART RATE: 70 BPM | DIASTOLIC BLOOD PRESSURE: 78 MMHG | HEIGHT: 66 IN | OXYGEN SATURATION: 99 %

## 2018-05-24 DIAGNOSIS — M25.512 CHRONIC PAIN OF BOTH SHOULDERS: ICD-10-CM

## 2018-05-24 DIAGNOSIS — N64.4 MASTODYNIA: ICD-10-CM

## 2018-05-24 DIAGNOSIS — M54.6 CHRONIC BILATERAL THORACIC BACK PAIN: Primary | ICD-10-CM

## 2018-05-24 DIAGNOSIS — L30.4 INTERTRIGO: ICD-10-CM

## 2018-05-24 DIAGNOSIS — M25.511 CHRONIC PAIN OF BOTH SHOULDERS: ICD-10-CM

## 2018-05-24 DIAGNOSIS — G89.29 CHRONIC PAIN OF BOTH SHOULDERS: ICD-10-CM

## 2018-05-24 DIAGNOSIS — G89.29 CHRONIC BILATERAL THORACIC BACK PAIN: Primary | ICD-10-CM

## 2018-05-24 PROCEDURE — 99213 OFFICE O/P EST LOW 20 MIN: CPT | Performed by: FAMILY MEDICINE

## 2018-05-24 ASSESSMENT — ANXIETY QUESTIONNAIRES
IF YOU CHECKED OFF ANY PROBLEMS ON THIS QUESTIONNAIRE, HOW DIFFICULT HAVE THESE PROBLEMS MADE IT FOR YOU TO DO YOUR WORK, TAKE CARE OF THINGS AT HOME, OR GET ALONG WITH OTHER PEOPLE: NOT DIFFICULT AT ALL
4. TROUBLE RELAXING: SEVERAL DAYS
3. WORRYING TOO MUCH ABOUT DIFFERENT THINGS: NOT AT ALL
7. FEELING AFRAID AS IF SOMETHING AWFUL MIGHT HAPPEN: NOT AT ALL
2. NOT BEING ABLE TO STOP OR CONTROL WORRYING: NOT AT ALL
1. FEELING NERVOUS, ANXIOUS, OR ON EDGE: SEVERAL DAYS
5. BEING SO RESTLESS THAT IT IS HARD TO SIT STILL: NOT AT ALL
6. BECOMING EASILY ANNOYED OR IRRITABLE: SEVERAL DAYS
GAD7 TOTAL SCORE: 3

## 2018-05-24 ASSESSMENT — PAIN SCALES - GENERAL: PAINLEVEL: NO PAIN (0)

## 2018-05-24 NOTE — NURSING NOTE
"Chief Complaint   Patient presents with     Forms       Initial /78 (BP Location: Left arm, Patient Position: Chair, Cuff Size: Adult Regular)  Pulse 70  Temp 97.8  F (36.6  C) (Tympanic)  Resp 18  Ht 5' 6\" (1.676 m)  Wt 170 lb (77.1 kg)  SpO2 99%  BMI 27.44 kg/m2 Estimated body mass index is 27.44 kg/(m^2) as calculated from the following:    Height as of this encounter: 5' 6\" (1.676 m).    Weight as of this encounter: 170 lb (77.1 kg).  Medication Reconciliation: complete    Astrid Cody LPN    "

## 2018-05-24 NOTE — PROGRESS NOTES
SUBJECTIVE:   Anabella Albrecht is a 42 year old female who presents to clinic today for the following health issues:        Forms      Duration: N/A    Description (location/character/radiation): N/A    Intensity:  N/A    Accompanying signs and symptoms: N/A    History (similar episodes/previous evaluation): N/A    Precipitating or alleviating factors: N/A    Therapies tried and outcome: N/A   Anabella has been declined for breast reduction surgery by Dr King Payton. She has had ongoing issues with upper thoracic back pain for the last 15 years due to her heavy breasts. In the last 2 years her pain has worsened and become significant. She has gone from a size 36 C to 38DD. With her menses she retains fluid and they become painful. She has tried a support sports bra but this digs into her shoulders and she has had to take it off getting home from work due to grooving in her shoulders and shoulder pain. She has chronic grooving of her shoulders. She has had intergrigo and chafing under her breasts when running to the point of bleeding. She has had to give up running due to her breast size, back pain and skin irritation and bleeding. She underwent PT here several years ago which was not helpful for her upper back pain and now continues with a chiropractor up to 3 times a week, Elis Null, for pain relief. Her last mammogram was normal on 8/22/17        Problem list and histories reviewed & adjusted, as indicated.  Additional history: as documented    Patient Active Problem List   Diagnosis     Scoliosis (and kyphoscoliosis), idiopathic     Anxiety state     Dysmenorrhea     ACP (advance care planning)     Menorrhagia with regular cycle     Contraception     PMDD (premenstrual dysphoric disorder)     Chronic bilateral thoracic back pain     Intertrigo     Chronic pain of both shoulders     Past Surgical History:   Procedure Laterality Date     Miscarriage  2006       Social History   Substance Use Topics     Smoking  "status: Never Smoker     Smokeless tobacco: Never Used     Alcohol use Yes      Comment: Rarely      Family History   Problem Relation Age of Onset     CANCER Other      pancreatic ca     CEREBROVASCULAR DISEASE Paternal Grandfather      CVA     DIABETES Maternal Grandfather      Hypertension Father      Hypertension Mother          Current Outpatient Prescriptions   Medication Sig Dispense Refill     cetirizine (ZYRTEC) 10 MG tablet Take 1 tablet (10 mg) by mouth daily 30 tablet 0     fluticasone (FLONASE) 50 MCG/ACT spray Spray 2 sprays into both nostrils daily 1 Bottle 0     ibuprofen (ADVIL/MOTRIN) 800 MG tablet Take 1 tablet (800 mg) by mouth every 8 hours as needed for moderate pain 20 tablet 0     No Known Allergies    Reviewed and updated as needed this visit by clinical staff       Reviewed and updated as needed this visit by Provider         ROS:  CONSTITUTIONAL:NEGATIVE for fever, chills, change in weight  INTEGUMENTARY/SKIN: see above   RESP:NEGATIVE for significant cough or SOB  CV: NEGATIVE for chest pain, palpitations or peripheral edema  NEURO: NEGATIVE for weakness, dizziness or paresthesias  PSYCHIATRIC: NEGATIVE for changes in mood or affect    OBJECTIVE:                                                    /78 (BP Location: Left arm, Patient Position: Chair, Cuff Size: Adult Regular)  Pulse 70  Temp 97.8  F (36.6  C) (Tympanic)  Resp 18  Ht 5' 6\" (1.676 m)  Wt 170 lb (77.1 kg)  SpO2 99%  BMI 27.44 kg/m2  Body mass index is 27.44 kg/(m^2).  GENERAL APPEARANCE: healthy, alert and no distress  BREAST: normal without masses, tenderness or nipple discharge, no palpable axillary masses or adenopathy and large, pendulous  MS: extremities normal- no gross deformities noted and tenderness of the upper neck and trapezius muscles bilaterally as well as the bilateral paraspinous muscles of the neck. Grooving of the shoulders bilaterally and tenderness of this area is noted  SKIN: erythema under " the left breast, irritated  NEURO: Normal strength and tone, mentation intact and speech normal  PSYCH: mentation appears normal and affect normal/bright         ASSESSMENT/PLAN:                                                    1. Chronic bilateral thoracic back pain  Due to large breast size     2. Intertrigo  To the point of bleeding in the past     3. Chronic pain of both shoulders  With grooving, due to bra     4. Mastodynia  Worse with menses.   She has significant symptoms due to her breast size          Elsi Webster MD  [facility]

## 2018-05-24 NOTE — Clinical Note
Please fax note to [phone], hanna MCCONNELL at [facility]

## 2018-05-24 NOTE — TELEPHONE ENCOUNTER
8:11 AM    Reason for Call: OVERBOOK    Patient is having the following symptoms:Insurance paperwork     The patient is requesting an appointment for today with Dr. Elis Webster    Was an appointment offered for this call?   No    Preferred method for responding to this message: [phone]    If we cannot reach you directly, may we leave a detailed response at the number you provided?  Yes      Donya Chu

## 2018-05-24 NOTE — MR AVS SNAPSHOT
After Visit Summary   5/24/2018    Anabella Albrecht    MRN: [id number]           Patient Information     Date Of Birth          1975        Visit Information        Provider Department      5/24/2018 10:15 AM Elis Webster MD [facility]        Today's Diagnoses     Chronic bilateral thoracic back pain    -  1    Intertrigo        Chronic pain of both shoulders           Follow-ups after your visit        Your next 10 appointments already scheduled     Jul 10, 2018 10:00 AM CDT   (Arrive by 9:45 AM)   SHORT with Eric Sood MD   [facility] [place] ([facility] [place] )    [address]  [place] [address]   [phone]              Who to contact     If you have questions or need follow up information about today's clinic visit or your schedule please contact [facility] directly at [phone].  Normal or non-critical lab and imaging results will be communicated to you by MyChart, letter or phone within 4 business days after the clinic has received the results. If you do not hear from us within 7 days, please contact the clinic through MyChart or phone. If you have a critical or abnormal lab result, we will notify you by phone as soon as possible.  Submit refill requests through [employer] or call your pharmacy and they will forward the refill request to us. Please allow 3 business days for your refill to be completed.          Additional Information About Your Visit        MyChart Information     [employer] gives you secure access to your electronic health record. If you see a primary care provider, you can also send messages to your care team and make appointments. If you have questions, please call your primary care clinic.  If you do not have a primary care provider, please call [phone] and they will assist you.        Care EveryWhere ID     This is your Care EveryWhere ID. This could be used by other organizations to access your [facility]  "medical records  [id number]        Your Vitals Were     Pulse Temperature Respirations Height Pulse Oximetry BMI (Body Mass Index)    70 97.8  F (36.6  C) (Tympanic) 18 5' 6\" (1.676 m) 99% 27.44 kg/m2       Blood Pressure from Last 3 Encounters:   05/24/18 104/78   05/21/18 114/77   04/10/18 106/61    Weight from Last 3 Encounters:   05/24/18 170 lb (77.1 kg)   04/10/18 170 lb (77.1 kg)   01/18/18 168 lb (76.2 kg)              Today, you had the following     No orders found for display       Primary Care Provider Office Phone # Fax #    Elis Webster [phone] [address] [facility] [address]        Equal Access to Services     KEILA YOUSSEF : Hadii haim holloway hadasho Soomaali, waaxda luqadaha, qaybta kaalmada adewandayaclaudia, terra acuña . So [facility] [phone].    ATENCIÓN: Si habla español, tiene a biggs disposición servicios gratuitos de asistencia lingüística. Montserrat al [phone].    We comply with applicable federal civil rights laws and [place] laws. We do not discriminate on the basis of race, color, national origin, age, disability, sex, sexual orientation, or gender identity.            Thank you!     Thank you for choosing [facility]  for your care. Our goal is always to provide you with excellent care. Hearing back from our patients is one way we can continue to improve our services. Please take a few minutes to complete the written survey that you may receive in the mail after your visit with us. Thank you!             Your Updated Medication List - Protect others around you: Learn how to safely use, store and throw away your medicines at www.disposemymeds.org.          This list is accurate as of 5/24/18 10:53 AM.  Always use your most recent med list.                   Brand Name Dispense Instructions for use Diagnosis    cetirizine 10 MG tablet    zyrTEC    30 tablet    Take 1 tablet (10 mg) by mouth daily        fluticasone 50 " MCG/ACT spray    FLONASE    1 Bottle    Spray 2 sprays into both nostrils daily        ibuprofen 800 MG tablet    ADVIL/MOTRIN    20 tablet    Take 1 tablet (800 mg) by mouth every 8 hours as needed for moderate pain

## 2018-05-24 NOTE — TELEPHONE ENCOUNTER
Please schedule patient for date/time: can see today at 10:15, arrival time 10:00    Have patient go to ER/Urgent Care Center. Urgent Care hours are 9:30 am to 8 pm, open 7 days a week. No.    Provider will call patient.No.    Other:

## 2018-05-25 ASSESSMENT — ENCOUNTER SYMPTOMS
DIARRHEA: 0
APPETITE CHANGE: 0
SINUS PRESSURE: 1
WEAKNESS: 0
TROUBLE SWALLOWING: 0
SINUS PAIN: 0
ACTIVITY CHANGE: 0
NAUSEA: 0
DYSURIA: 0
ABDOMINAL PAIN: 0
COUGH: 0
PSYCHIATRIC NEGATIVE: 1
CHILLS: 0
FEVER: 0
RHINORRHEA: 0
SORE THROAT: 0
VOMITING: 0

## 2018-05-25 ASSESSMENT — ANXIETY QUESTIONNAIRES: GAD7 TOTAL SCORE: 3

## 2018-05-25 ASSESSMENT — PATIENT HEALTH QUESTIONNAIRE - PHQ9: SUM OF ALL RESPONSES TO PHQ QUESTIONS 1-9: 3

## 2018-05-29 ENCOUNTER — TELEPHONE (OUTPATIENT)
Dept: FAMILY MEDICINE | Facility: OTHER | Age: 43
End: 2018-05-29

## 2018-09-21 ENCOUNTER — RADIANT APPOINTMENT (OUTPATIENT)
Dept: MAMMOGRAPHY | Facility: OTHER | Age: 43
End: 2018-09-21
Attending: FAMILY MEDICINE
Payer: COMMERCIAL

## 2018-09-21 DIAGNOSIS — Z12.39 SCREENING BREAST EXAMINATION: ICD-10-CM

## 2018-09-21 PROCEDURE — 77067 SCR MAMMO BI INCL CAD: CPT | Mod: TC

## 2018-09-21 PROCEDURE — 77063 BREAST TOMOSYNTHESIS BI: CPT | Mod: TC

## 2018-09-24 DIAGNOSIS — J30.2 CHRONIC SEASONAL ALLERGIC RHINITIS, UNSPECIFIED TRIGGER: Primary | ICD-10-CM

## 2018-09-24 RX ORDER — FLUTICASONE PROPIONATE 50 MCG
2 SPRAY, SUSPENSION (ML) NASAL DAILY
Qty: 1 BOTTLE | Refills: 0 | Status: SHIPPED | OUTPATIENT
Start: 2018-09-24 | End: 2019-09-03

## 2018-09-25 DIAGNOSIS — F41.9 ANXIETY: Primary | ICD-10-CM

## 2018-10-15 ENCOUNTER — TELEPHONE (OUTPATIENT)
Dept: FAMILY MEDICINE | Facility: OTHER | Age: 43
End: 2018-10-15

## 2018-10-15 DIAGNOSIS — F41.9 ANXIETY: ICD-10-CM

## 2018-10-15 NOTE — TELEPHONE ENCOUNTER
12:57 PM    Reason for Call: Phone Call    Description: ladonna called and said her RX was entered incorrectly offered to send her to the refill lanie she said no she wasntBrian marroquin nurse to call her back    Was an appointment offered for this call? No  If yes : Appointment type              Date    Preferred method for responding to this message: Telephone Call  What is your phone number ? [phone]    If we cannot reach you directly, may we leave a detailed response at the number you provided? Yes    Can this message wait until your PCP/provider returns, if available today? Not applicable, Provider is in    Kelly Shoen

## 2018-10-16 RX ORDER — FLUOXETINE 40 MG/1
40 CAPSULE ORAL DAILY
Qty: 30 CAPSULE | Refills: 3 | Status: SHIPPED | OUTPATIENT
Start: 2018-10-16 | End: 2019-02-14

## 2018-10-16 NOTE — TELEPHONE ENCOUNTER
Patient called back and states for her Prozac it should be 1 40 mg tablet instead of 2 20 mg tablets and there were no refills. Wondering if you could change it?

## 2019-01-01 NOTE — TELEPHONE ENCOUNTER
Called patient back in regards to this, we did place a referral back in 2013 but per insurance new one needs to be done. Order placed for you to sign.    family or friend will provide

## 2019-02-14 DIAGNOSIS — F41.9 ANXIETY: ICD-10-CM

## 2019-02-14 RX ORDER — FLUOXETINE 40 MG/1
CAPSULE ORAL
Qty: 30 CAPSULE | Refills: 3 | Status: SHIPPED | OUTPATIENT
Start: 2019-02-14 | End: 2019-08-08

## 2019-05-21 ENCOUNTER — TELEPHONE (OUTPATIENT)
Dept: FAMILY MEDICINE | Facility: OTHER | Age: 44
End: 2019-05-21

## 2019-05-21 NOTE — TELEPHONE ENCOUNTER
4:11 PM    Reason for Call: Phone Call    Description: Patient states that Dr. Webster had wrote a letter and had some documents to support breast reduction and she states she needs information updated. Please call patient for more information.    Was an appointment offered for this call? No  If yes : Appointment type              Date    Preferred method for responding to this message: Telephone Call  What is your phone number ? [phone]    If we cannot reach you directly, may we leave a detailed response at the number you provided? Yes    Can this message wait until your PCP/provider returns, if available today? YES    Astrid Cody LPN

## 2019-05-22 NOTE — TELEPHONE ENCOUNTER
Pt called again regarding this.  Pt is aware that provider is out of the office today and will return phone call when back in the office.

## 2019-05-24 NOTE — TELEPHONE ENCOUNTER
Pt requesting original forms/ (documentation) regarding breast reduction with updated dates.    Pt reports she has called a couple times and hasn't heard back.

## 2019-05-27 NOTE — TELEPHONE ENCOUNTER
I have gone back through my notes to 2013 and can't find any forms. We most likely will need to see her to update

## 2019-05-28 NOTE — TELEPHONE ENCOUNTER
Left message with information below, advised to make follow up with Dr Elis Webster, scheduling number given.

## 2019-08-08 DIAGNOSIS — F41.9 ANXIETY: ICD-10-CM

## 2019-08-08 NOTE — TELEPHONE ENCOUNTER
PCP Radha Webster   Patient has been taking 40 mg but states she is feeling better and would like to reduce dose to 20 mg.      FLUoxetine (PROZAC) 40 MG capsule  Last Written Prescription Date:  2/14/2019  Last Fill Quantity: 30,   # refills: 30  Last Office Visit: 5/24/2018  Future Office visit:       Routing refill request to provider for review/approval because:  Patient is requesting a decrease due to feeling better.  Would like to decrease

## 2019-09-03 ENCOUNTER — OFFICE VISIT (OUTPATIENT)
Dept: OBGYN | Facility: OTHER | Age: 44
End: 2019-09-03
Attending: OBSTETRICS & GYNECOLOGY
Payer: COMMERCIAL

## 2019-09-03 VITALS
HEART RATE: 76 BPM | HEIGHT: 66 IN | DIASTOLIC BLOOD PRESSURE: 68 MMHG | SYSTOLIC BLOOD PRESSURE: 122 MMHG | BODY MASS INDEX: 28.61 KG/M2 | WEIGHT: 178 LBS

## 2019-09-03 DIAGNOSIS — N94.6 DYSMENORRHEA: Primary | ICD-10-CM

## 2019-09-03 DIAGNOSIS — N92.0 MENORRHAGIA WITH REGULAR CYCLE: ICD-10-CM

## 2019-09-03 PROCEDURE — 99214 OFFICE O/P EST MOD 30 MIN: CPT | Mod: 57 | Performed by: OBSTETRICS & GYNECOLOGY

## 2019-09-03 ASSESSMENT — PAIN SCALES - GENERAL: PAINLEVEL: NO PAIN (0)

## 2019-09-03 ASSESSMENT — MIFFLIN-ST. JEOR: SCORE: 1484.15
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
## 2019-09-03 NOTE — NURSING NOTE
"Chief Complaint   Patient presents with     Follow Up     menorrhagia       Initial /68 (BP Location: Left arm, Patient Position: Sitting, Cuff Size: Adult Regular)   Pulse 76   Ht 1.676 m (5' 6\")   Wt 80.7 kg (178 lb)   BMI 28.73 kg/m   Estimated body mass index is 28.73 kg/m  as calculated from the following:    Height as of this encounter: 1.676 m (5' 6\").    Weight as of this encounter: 80.7 kg (178 lb).  Medication Reconciliation: complete    "

## 2019-09-04 ENCOUNTER — ANESTHESIA EVENT (OUTPATIENT)
Dept: SURGERY | Facility: HOSPITAL | Age: 44
End: 2019-09-04
Payer: COMMERCIAL

## 2019-09-04 DIAGNOSIS — N92.0 MENORRHAGIA WITH REGULAR CYCLE: Primary | ICD-10-CM

## 2019-09-04 DIAGNOSIS — R10.2 PELVIC PAIN IN FEMALE: ICD-10-CM
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
## 2019-09-10 ENCOUNTER — APPOINTMENT (OUTPATIENT)
Dept: LAB | Facility: OTHER | Age: 44
End: 2019-09-10
Attending: NURSE PRACTITIONER
Payer: COMMERCIAL

## 2019-09-10 ENCOUNTER — OFFICE VISIT (OUTPATIENT)
Dept: FAMILY MEDICINE | Facility: OTHER | Age: 44
End: 2019-09-10
Attending: NURSE PRACTITIONER
Payer: COMMERCIAL

## 2019-09-10 VITALS
WEIGHT: 173 LBS | HEIGHT: 63 IN | OXYGEN SATURATION: 98 % | BODY MASS INDEX: 30.65 KG/M2 | SYSTOLIC BLOOD PRESSURE: 110 MMHG | HEART RATE: 73 BPM | TEMPERATURE: 98 F | DIASTOLIC BLOOD PRESSURE: 74 MMHG

## 2019-09-10 DIAGNOSIS — Z01.818 PREOP GENERAL PHYSICAL EXAM: Primary | ICD-10-CM

## 2019-09-10 DIAGNOSIS — N94.6 DYSMENORRHEA: ICD-10-CM

## 2019-09-10 LAB
ANION GAP SERPL CALCULATED.3IONS-SCNC: 9 MMOL/L (ref 3–14)
BASOPHILS # BLD AUTO: 0.1 10E9/L (ref 0–0.2)
BASOPHILS NFR BLD AUTO: 1.1 %
BUN SERPL-MCNC: 17 MG/DL (ref 7–30)
CALCIUM SERPL-MCNC: 8.7 MG/DL (ref 8.5–10.1)
CHLORIDE SERPL-SCNC: 108 MMOL/L (ref 94–109)
CO2 SERPL-SCNC: 22 MMOL/L (ref 20–32)
CREAT SERPL-MCNC: 1.02 MG/DL (ref 0.52–1.04)
DIFFERENTIAL METHOD BLD: NORMAL
EOSINOPHIL # BLD AUTO: 0.2 10E9/L (ref 0–0.7)
EOSINOPHIL NFR BLD AUTO: 2.7 %
ERYTHROCYTE [DISTWIDTH] IN BLOOD BY AUTOMATED COUNT: 13.3 % (ref 10–15)
GFR SERPL CREATININE-BSD FRML MDRD: 68 ML/MIN/{1.73_M2}
GLUCOSE SERPL-MCNC: 103 MG/DL (ref 70–99)
HCG UR QL: NEGATIVE
HCT VFR BLD AUTO: 37.2 % (ref 35–47)
HGB BLD-MCNC: 12.6 G/DL (ref 11.7–15.7)
IMM GRANULOCYTES # BLD: 0 10E9/L (ref 0–0.4)
IMM GRANULOCYTES NFR BLD: 0.4 %
LYMPHOCYTES # BLD AUTO: 1.7 10E9/L (ref 0.8–5.3)
LYMPHOCYTES NFR BLD AUTO: 29.5 %
MCH RBC QN AUTO: 28.7 PG (ref 26.5–33)
MCHC RBC AUTO-ENTMCNC: 33.9 G/DL (ref 31.5–36.5)
MCV RBC AUTO: 85 FL (ref 78–100)
MONOCYTES # BLD AUTO: 0.5 10E9/L (ref 0–1.3)
MONOCYTES NFR BLD AUTO: 8 %
NEUTROPHILS # BLD AUTO: 3.3 10E9/L (ref 1.6–8.3)
NEUTROPHILS NFR BLD AUTO: 58.3 %
NRBC # BLD AUTO: 0 10*3/UL
NRBC BLD AUTO-RTO: 0 /100
PLATELET # BLD AUTO: 278 10E9/L (ref 150–450)
POTASSIUM SERPL-SCNC: 3.9 MMOL/L (ref 3.4–5.3)
RBC # BLD AUTO: 4.39 10E12/L (ref 3.8–5.2)
SODIUM SERPL-SCNC: 139 MMOL/L (ref 133–144)
WBC # BLD AUTO: 5.6 10E9/L (ref 4–11)

## 2019-09-10 PROCEDURE — 85025 COMPLETE CBC W/AUTO DIFF WBC: CPT | Performed by: NURSE PRACTITIONER

## 2019-09-10 PROCEDURE — 36415 COLL VENOUS BLD VENIPUNCTURE: CPT | Performed by: NURSE PRACTITIONER

## 2019-09-10 PROCEDURE — 81025 URINE PREGNANCY TEST: CPT | Performed by: NURSE PRACTITIONER

## 2019-09-10 PROCEDURE — 80048 BASIC METABOLIC PNL TOTAL CA: CPT | Performed by: NURSE PRACTITIONER

## 2019-09-10 PROCEDURE — 99213 OFFICE O/P EST LOW 20 MIN: CPT | Performed by: NURSE PRACTITIONER

## 2019-09-10 ASSESSMENT — PAIN SCALES - GENERAL: PAINLEVEL: NO PAIN (0)

## 2019-09-10 ASSESSMENT — MIFFLIN-ST. JEOR: SCORE: 1413.85

## 2019-09-10 NOTE — NURSING NOTE
"Chief Complaint   Patient presents with     Pre-Op Exam     on 9-11-19 at [facility] with Dr. garcia for DILITATION AND CURETTAGE(FROZEN SECTION)HYSTEROSCOPY, ENDOMETRIAL ABLATION       Initial /74 (BP Location: Left arm, Patient Position: Chair, Cuff Size: Adult Regular)   Pulse 73   Temp 98  F (36.7  C) (Tympanic)   Ht 1.6 m (5' 3\")   Wt 78.5 kg (173 lb)   SpO2 98%   BMI 30.65 kg/m   Estimated body mass index is 30.65 kg/m  as calculated from the following:    Height as of this encounter: 1.6 m (5' 3\").    Weight as of this encounter: 78.5 kg (173 lb).  Medication Reconciliation: complete   Nickie Poe CMA   "

## 2019-09-10 NOTE — LETTER
My Depression Action Plan  Name: Anabella Albrecht   Date of Birth 1975  Date: 9/10/2019    My doctor: Elis Webster   My clinic: [facility] [place]  [address]  [place] [address]  [phone]          GREEN    ZONE   Good Control    What it looks like:     Things are going generally well. You have normal up s and down s. You may even feel depressed from time to time, but bad moods usually last less than a day.   What you need to do:  1. Continue to care for yourself (see self care plan)  2. Check your depression survival kit and update it as needed  3. Follow your physician s recommendations including any medication.  4. Do not stop taking medication unless you consult with your physician first.           YELLOW         ZONE Getting Worse    What it looks like:     Depression is starting to interfere with your life.     It may be hard to get out of bed; you may be starting to isolate yourself from others.    Symptoms of depression are starting to last most all day and this has happened for several days.     You may have suicidal thoughts but they are not constant.   What you need to do:     1. Call your care team, your response to treatment will improve if you keep your care team informed of your progress. Yellow periods are signs an adjustment may need to be made.     2. Continue your self-care, even if you have to fake it!    3. Talk to someone in your support network    4. Open up your depression survival kit           RED    ZONE Medical Alert - Get Help    What it looks like:     Depression is seriously interfering with your life.     You may experience these or other symptoms: You can t get out of bed most days, can t work or engage in other necessary activities, you have trouble taking care of basic hygiene, or basic responsibilities, thoughts of suicide or death that will not go away, self-injurious behavior.     What you need to do:  1. Call your care team and request a  same-day appointment. If they are not available (weekends or after hours) call your local crisis line, emergency room or 911.            Depression Self Care Plan / Survival Kit    Self-Care for Depression  Here s the deal. Your body and mind are really not as separate as most people think.  What you do and think affects how you feel and how you feel influences what you do and think. This means if you do things that people who feel good do, it will help you feel better.  Sometimes this is all it takes.  There is also a place for medication and therapy depending on how severe your depression is, so be sure to consult with your medical provider and/ or Behavioral Health Consultant if your symptoms are worsening or not improving.     In order to better manage my stress, I will:    Exercise  Get some form of exercise, every day. This will help reduce pain and release endorphins, the  feel good  chemicals in your brain. This is almost as good as taking antidepressants!  This is not the same as joining a gym and then never going! (they count on that by the way ) It can be as simple as just going for a walk or doing some gardening, anything that will get you moving.      Hygiene   Maintain good hygiene (Get out of bed in the morning, Make your bed, Brush your teeth, Take a shower, and Get dressed like you were going to work, even if you are unemployed).  If your clothes don't fit try to get ones that do.    Diet  I will strive to eat foods that are good for me, drink plenty of water, and avoid excessive sugar, caffeine, alcohol, and other mood-altering substances.  Some foods that are helpful in depression are: complex carbohydrates, B vitamins, flaxseed, fish or fish oil, fresh fruits and vegetables.    Psychotherapy  I agree to participate in Individual Therapy (if recommended).    Medication  If prescribed medications, I agree to take them.  Missing doses can result in serious side effects.  I understand that drinking  alcohol, or other illicit drug use, may cause potential side effects.  I will not stop my medication abruptly without first discussing it with my provider.    Staying Connected With Others  I will stay in touch with my friends, family members, and my primary care provider/team.    Use your imagination  Be creative.  We all have a creative side; it doesn t matter if it s oil painting, sand castles, or mud pies! This will also kick up the endorphins.    Witness Beauty  (AKA stop and smell the roses) Take a look outside, even in mid-winter. Notice colors, textures. Watch the squirrels and birds.     Service to others  Be of service to others.  There is always someone else in need.  By helping others we can  get out of ourselves  and remember the really important things.  This also provides opportunities for practicing all the other parts of the program.    Humor  Laugh and be silly!  Adjust your TV habits for less news and crime-drama and more comedy.    Control your stress  Try breathing deep, massage therapy, biofeedback, and meditation. Find time to relax each day.     My support system    Clinic Contact:  Phone number:    Contact 1:  Phone number:    Contact 2:  Phone number:    Sikhism/:  Phone number:    Therapist:  Phone number:    Local crisis center:    Phone number:    Other community support:  Phone number:

## 2019-09-11 ENCOUNTER — HOSPITAL ENCOUNTER (OUTPATIENT)
Facility: HOSPITAL | Age: 44
Discharge: HOME | End: 2019-09-11
Attending: OBSTETRICS & GYNECOLOGY | Admitting: OBSTETRICS & GYNECOLOGY
Payer: COMMERCIAL

## 2019-09-11 ENCOUNTER — ANESTHESIA (OUTPATIENT)
Dept: SURGERY | Facility: HOSPITAL | Age: 44
End: 2019-09-11
Payer: COMMERCIAL

## 2019-09-11 VITALS
SYSTOLIC BLOOD PRESSURE: 121 MMHG | DIASTOLIC BLOOD PRESSURE: 83 MMHG | TEMPERATURE: 97.6 F | RESPIRATION RATE: 16 BRPM | OXYGEN SATURATION: 96 %

## 2019-09-11 DIAGNOSIS — Z98.890 POST-OPERATIVE STATE: Primary | ICD-10-CM

## 2019-09-11 LAB
ABO + RH BLD: NORMAL
ABO + RH BLD: NORMAL
BLD GP AB SCN SERPL QL: NORMAL
BLOOD BANK CMNT PATIENT-IMP: NORMAL
SPECIMEN EXP DATE BLD: NORMAL

## 2019-09-11 PROCEDURE — 88305 TISSUE EXAM BY PATHOLOGIST: CPT | Mod: TC | Performed by: OBSTETRICS & GYNECOLOGY

## 2019-09-11 PROCEDURE — 88331 PATH CONSLTJ SURG 1 BLK 1SPC: CPT | Mod: TC | Performed by: OBSTETRICS & GYNECOLOGY

## 2019-09-11 PROCEDURE — 58662 LAPAROSCOPY EXCISE LESIONS: CPT | Performed by: OBSTETRICS & GYNECOLOGY

## 2019-09-11 PROCEDURE — 86900 BLOOD TYPING SEROLOGIC ABO: CPT | Performed by: OBSTETRICS & GYNECOLOGY

## 2019-09-11 PROCEDURE — 40000306 ZZH STATISTIC PRE PROC ASSESS II: Performed by: OBSTETRICS & GYNECOLOGY

## 2019-09-11 PROCEDURE — 27210794 ZZH OR GENERAL SUPPLY STERILE: Performed by: OBSTETRICS & GYNECOLOGY

## 2019-09-11 PROCEDURE — 71000014 ZZH RECOVERY PHASE 1 LEVEL 2 FIRST HR: Performed by: OBSTETRICS & GYNECOLOGY

## 2019-09-11 PROCEDURE — 37000009 ZZH ANESTHESIA TECHNICAL FEE, EACH ADDTL 15 MIN: Performed by: OBSTETRICS & GYNECOLOGY

## 2019-09-11 PROCEDURE — 25000128 H RX IP 250 OP 636: Performed by: OBSTETRICS & GYNECOLOGY

## 2019-09-11 PROCEDURE — 37000008 ZZH ANESTHESIA TECHNICAL FEE, 1ST 30 MIN: Performed by: OBSTETRICS & GYNECOLOGY

## 2019-09-11 PROCEDURE — 86850 RBC ANTIBODY SCREEN: CPT | Performed by: OBSTETRICS & GYNECOLOGY

## 2019-09-11 PROCEDURE — 58563 HYSTEROSCOPY ABLATION: CPT | Performed by: ANESTHESIOLOGY

## 2019-09-11 PROCEDURE — 86901 BLOOD TYPING SEROLOGIC RH(D): CPT | Performed by: OBSTETRICS & GYNECOLOGY

## 2019-09-11 PROCEDURE — 36415 COLL VENOUS BLD VENIPUNCTURE: CPT | Performed by: OBSTETRICS & GYNECOLOGY

## 2019-09-11 PROCEDURE — 25800030 ZZH RX IP 258 OP 636: Performed by: ANESTHESIOLOGY

## 2019-09-11 PROCEDURE — 58662 LAPAROSCOPY EXCISE LESIONS: CPT | Mod: AS | Performed by: NURSE PRACTITIONER

## 2019-09-11 PROCEDURE — 36000058 ZZH SURGERY LEVEL 3 EA 15 ADDTL MIN: Performed by: OBSTETRICS & GYNECOLOGY

## 2019-09-11 PROCEDURE — 25000132 ZZH RX MED GY IP 250 OP 250 PS 637: Performed by: OBSTETRICS & GYNECOLOGY

## 2019-09-11 PROCEDURE — 71000027 ZZH RECOVERY PHASE 2 EACH 15 MINS: Performed by: OBSTETRICS & GYNECOLOGY

## 2019-09-11 PROCEDURE — 27110028 ZZH OR GENERAL SUPPLY NON-STERILE: Performed by: OBSTETRICS & GYNECOLOGY

## 2019-09-11 PROCEDURE — 25800030 ZZH RX IP 258 OP 636: Performed by: NURSE ANESTHETIST, CERTIFIED REGISTERED

## 2019-09-11 PROCEDURE — 25000566 ZZH SEVOFLURANE, EA 15 MIN: Performed by: ANESTHESIOLOGY

## 2019-09-11 PROCEDURE — 25000128 H RX IP 250 OP 636: Performed by: NURSE ANESTHETIST, CERTIFIED REGISTERED

## 2019-09-11 PROCEDURE — 58563 HYSTEROSCOPY ABLATION: CPT | Performed by: NURSE ANESTHETIST, CERTIFIED REGISTERED

## 2019-09-11 PROCEDURE — 25000125 ZZHC RX 250: Performed by: NURSE ANESTHETIST, CERTIFIED REGISTERED

## 2019-09-11 PROCEDURE — 36000056 ZZH SURGERY LEVEL 3 1ST 30 MIN: Performed by: OBSTETRICS & GYNECOLOGY

## 2019-09-11 PROCEDURE — 58563 HYSTEROSCOPY ABLATION: CPT | Mod: 51 | Performed by: OBSTETRICS & GYNECOLOGY

## 2019-09-11 PROCEDURE — 25000131 ZZH RX MED GY IP 250 OP 636 PS 637: Performed by: OBSTETRICS & GYNECOLOGY

## 2019-09-11 PROCEDURE — 25000125 ZZHC RX 250: Performed by: ANESTHESIOLOGY

## 2019-09-11 RX ORDER — ONDANSETRON 4 MG/1
4 TABLET, ORALLY DISINTEGRATING ORAL
Status: COMPLETED | OUTPATIENT
Start: 2019-09-11 | End: 2019-09-11

## 2019-09-11 RX ORDER — FENTANYL CITRATE 50 UG/ML
INJECTION, SOLUTION INTRAMUSCULAR; INTRAVENOUS PRN
Status: DISCONTINUED | OUTPATIENT
Start: 2019-09-11 | End: 2019-09-11

## 2019-09-11 RX ORDER — KETOROLAC TROMETHAMINE 30 MG/ML
INJECTION, SOLUTION INTRAMUSCULAR; INTRAVENOUS PRN
Status: DISCONTINUED | OUTPATIENT
Start: 2019-09-11 | End: 2019-09-11

## 2019-09-11 RX ORDER — LIDOCAINE HYDROCHLORIDE 20 MG/ML
INJECTION, SOLUTION INFILTRATION; PERINEURAL PRN
Status: DISCONTINUED | OUTPATIENT
Start: 2019-09-11 | End: 2019-09-11

## 2019-09-11 RX ORDER — PROPOFOL 10 MG/ML
INJECTION, EMULSION INTRAVENOUS PRN
Status: DISCONTINUED | OUTPATIENT
Start: 2019-09-11 | End: 2019-09-11

## 2019-09-11 RX ORDER — FENTANYL CITRATE 50 UG/ML
25-50 INJECTION, SOLUTION INTRAMUSCULAR; INTRAVENOUS
Status: DISCONTINUED | OUTPATIENT
Start: 2019-09-11 | End: 2019-09-11 | Stop reason: HOSPADM

## 2019-09-11 RX ORDER — SODIUM CHLORIDE, SODIUM LACTATE, POTASSIUM CHLORIDE, CALCIUM CHLORIDE 600; 310; 30; 20 MG/100ML; MG/100ML; MG/100ML; MG/100ML
INJECTION, SOLUTION INTRAVENOUS CONTINUOUS
Status: DISCONTINUED | OUTPATIENT
Start: 2019-09-11 | End: 2019-09-11 | Stop reason: HOSPADM

## 2019-09-11 RX ORDER — NEOSTIGMINE METHYLSULFATE 1 MG/ML
VIAL (ML) INJECTION PRN
Status: DISCONTINUED | OUTPATIENT
Start: 2019-09-11 | End: 2019-09-11

## 2019-09-11 RX ORDER — KETAMINE HYDROCHLORIDE 10 MG/ML
INJECTION INTRAMUSCULAR; INTRAVENOUS PRN
Status: DISCONTINUED | OUTPATIENT
Start: 2019-09-11 | End: 2019-09-11

## 2019-09-11 RX ORDER — HYDROMORPHONE HYDROCHLORIDE 1 MG/ML
.3-.5 INJECTION, SOLUTION INTRAMUSCULAR; INTRAVENOUS; SUBCUTANEOUS EVERY 10 MIN PRN
Status: DISCONTINUED | OUTPATIENT
Start: 2019-09-11 | End: 2019-09-11 | Stop reason: HOSPADM

## 2019-09-11 RX ORDER — MEPERIDINE HYDROCHLORIDE 50 MG/ML
12.5 INJECTION INTRAMUSCULAR; INTRAVENOUS; SUBCUTANEOUS
Status: DISCONTINUED | OUTPATIENT
Start: 2019-09-11 | End: 2019-09-11 | Stop reason: HOSPADM

## 2019-09-11 RX ORDER — ONDANSETRON 2 MG/ML
4 INJECTION INTRAMUSCULAR; INTRAVENOUS EVERY 30 MIN PRN
Status: DISCONTINUED | OUTPATIENT
Start: 2019-09-11 | End: 2019-09-11 | Stop reason: HOSPADM

## 2019-09-11 RX ORDER — ONDANSETRON 4 MG/1
4 TABLET, ORALLY DISINTEGRATING ORAL EVERY 30 MIN PRN
Status: DISCONTINUED | OUTPATIENT
Start: 2019-09-11 | End: 2019-09-11 | Stop reason: HOSPADM

## 2019-09-11 RX ORDER — NALOXONE HYDROCHLORIDE 0.4 MG/ML
.1-.4 INJECTION, SOLUTION INTRAMUSCULAR; INTRAVENOUS; SUBCUTANEOUS
Status: DISCONTINUED | OUTPATIENT
Start: 2019-09-11 | End: 2019-09-11 | Stop reason: HOSPADM

## 2019-09-11 RX ORDER — HYDROCODONE BITARTRATE AND ACETAMINOPHEN 5; 325 MG/1; MG/1
1-2 TABLET ORAL EVERY 6 HOURS PRN
Qty: 25 TABLET | Refills: 0 | Status: SHIPPED | OUTPATIENT
Start: 2019-09-11 | End: 2019-09-15

## 2019-09-11 RX ORDER — IBUPROFEN 800 MG/1
800 TABLET, FILM COATED ORAL EVERY 8 HOURS PRN
Qty: 30 TABLET | Refills: 1 | Status: SHIPPED | OUTPATIENT
Start: 2019-09-11 | End: 2019-09-15

## 2019-09-11 RX ORDER — LIDOCAINE 40 MG/G
CREAM TOPICAL
Status: DISCONTINUED | OUTPATIENT
Start: 2019-09-11 | End: 2019-09-11 | Stop reason: HOSPADM

## 2019-09-11 RX ORDER — ALBUTEROL SULFATE 0.83 MG/ML
2.5 SOLUTION RESPIRATORY (INHALATION) EVERY 4 HOURS PRN
Status: DISCONTINUED | OUTPATIENT
Start: 2019-09-11 | End: 2019-09-11 | Stop reason: HOSPADM

## 2019-09-11 RX ORDER — KETOROLAC TROMETHAMINE 30 MG/ML
30 INJECTION, SOLUTION INTRAMUSCULAR; INTRAVENOUS ONCE
Status: COMPLETED | OUTPATIENT
Start: 2019-09-11 | End: 2019-09-11

## 2019-09-11 RX ORDER — CEFAZOLIN SODIUM 1 G/3ML
1 INJECTION, POWDER, FOR SOLUTION INTRAMUSCULAR; INTRAVENOUS SEE ADMIN INSTRUCTIONS
Status: DISCONTINUED | OUTPATIENT
Start: 2019-09-11 | End: 2019-09-11 | Stop reason: HOSPADM

## 2019-09-11 RX ORDER — GLYCOPYRROLATE 0.2 MG/ML
INJECTION, SOLUTION INTRAMUSCULAR; INTRAVENOUS PRN
Status: DISCONTINUED | OUTPATIENT
Start: 2019-09-11 | End: 2019-09-11

## 2019-09-11 RX ORDER — HYDROCODONE BITARTRATE AND ACETAMINOPHEN 5; 325 MG/1; MG/1
1 TABLET ORAL
Status: COMPLETED | OUTPATIENT
Start: 2019-09-11 | End: 2019-09-11

## 2019-09-11 RX ORDER — SCOLOPAMINE TRANSDERMAL SYSTEM 1 MG/1
1 PATCH, EXTENDED RELEASE TRANSDERMAL ONCE
Status: COMPLETED | OUTPATIENT
Start: 2019-09-11 | End: 2019-09-11

## 2019-09-11 RX ORDER — CEFAZOLIN SODIUM 2 G/100ML
2 INJECTION, SOLUTION INTRAVENOUS
Status: COMPLETED | OUTPATIENT
Start: 2019-09-11 | End: 2019-09-11

## 2019-09-11 RX ORDER — ONDANSETRON 2 MG/ML
INJECTION INTRAMUSCULAR; INTRAVENOUS PRN
Status: DISCONTINUED | OUTPATIENT
Start: 2019-09-11 | End: 2019-09-11

## 2019-09-11 RX ADMIN — ROCURONIUM BROMIDE 10 MG: 10 INJECTION INTRAVENOUS at 09:58

## 2019-09-11 RX ADMIN — KETOROLAC TROMETHAMINE 30 MG: 30 INJECTION, SOLUTION INTRAMUSCULAR at 08:45

## 2019-09-11 RX ADMIN — SODIUM CHLORIDE, POTASSIUM CHLORIDE, SODIUM LACTATE AND CALCIUM CHLORIDE 1000 ML: 600; 310; 30; 20 INJECTION, SOLUTION INTRAVENOUS at 08:35

## 2019-09-11 RX ADMIN — FENTANYL CITRATE 50 MCG: 50 INJECTION INTRAMUSCULAR; INTRAVENOUS at 11:23

## 2019-09-11 RX ADMIN — FENTANYL CITRATE 50 MCG: 50 INJECTION, SOLUTION INTRAMUSCULAR; INTRAVENOUS at 10:16

## 2019-09-11 RX ADMIN — ROCURONIUM BROMIDE 10 MG: 10 INJECTION INTRAVENOUS at 10:13

## 2019-09-11 RX ADMIN — PROPOFOL 50 MG: 10 INJECTION, EMULSION INTRAVENOUS at 09:40

## 2019-09-11 RX ADMIN — PROPOFOL 150 MG: 10 INJECTION, EMULSION INTRAVENOUS at 09:27

## 2019-09-11 RX ADMIN — ROCURONIUM BROMIDE 10 MG: 10 INJECTION INTRAVENOUS at 09:41

## 2019-09-11 RX ADMIN — KETAMINE HYDROCHLORIDE 20 MG: 10 INJECTION, SOLUTION INTRAMUSCULAR; INTRAVENOUS at 09:55

## 2019-09-11 RX ADMIN — HYDROCODONE BITARTRATE AND ACETAMINOPHEN 1 TABLET: 5; 325 TABLET ORAL at 12:10

## 2019-09-11 RX ADMIN — SCOPALAMINE 1 PATCH: 1 PATCH, EXTENDED RELEASE TRANSDERMAL at 08:41

## 2019-09-11 RX ADMIN — CEFAZOLIN SODIUM 2 G: 2 INJECTION, SOLUTION INTRAVENOUS at 09:31

## 2019-09-11 RX ADMIN — FENTANYL CITRATE 50 MCG: 50 INJECTION, SOLUTION INTRAMUSCULAR; INTRAVENOUS at 09:25

## 2019-09-11 RX ADMIN — KETOROLAC TROMETHAMINE 15 MG: 30 INJECTION, SOLUTION INTRAMUSCULAR at 10:20

## 2019-09-11 RX ADMIN — ONDANSETRON 4 MG: 4 TABLET, ORALLY DISINTEGRATING ORAL at 12:29

## 2019-09-11 RX ADMIN — Medication 3.5 MG: at 10:27

## 2019-09-11 RX ADMIN — GLYCOPYRROLATE 0.5 MG: 0.2 INJECTION, SOLUTION INTRAMUSCULAR; INTRAVENOUS at 10:27

## 2019-09-11 RX ADMIN — ONDANSETRON 4 MG: 2 INJECTION INTRAMUSCULAR; INTRAVENOUS at 09:29

## 2019-09-11 RX ADMIN — SODIUM CHLORIDE, POTASSIUM CHLORIDE, SODIUM LACTATE AND CALCIUM CHLORIDE: 600; 310; 30; 20 INJECTION, SOLUTION INTRAVENOUS at 09:23

## 2019-09-11 RX ADMIN — FENTANYL CITRATE 50 MCG: 50 INJECTION, SOLUTION INTRAMUSCULAR; INTRAVENOUS at 09:42

## 2019-09-11 RX ADMIN — GLYCOPYRROLATE 0.2 MG: 0.2 INJECTION, SOLUTION INTRAMUSCULAR; INTRAVENOUS at 10:07

## 2019-09-11 RX ADMIN — KETAMINE HYDROCHLORIDE 10 MG: 10 INJECTION, SOLUTION INTRAMUSCULAR; INTRAVENOUS at 10:19

## 2019-09-11 RX ADMIN — LIDOCAINE HYDROCHLORIDE 80 MG: 20 INJECTION, SOLUTION INFILTRATION; PERINEURAL at 09:28

## 2019-09-11 RX ADMIN — Medication 100 MG: at 09:28

## 2019-09-11 NOTE — OP NOTE
[facility]    PREOPERATIVE DIAGNOSIS:  MENORRHAGIA WITH REGULAR CYCLE, PELVIC PAIN IN FEMALE      POSTOPERATIVE DIAGNOSIS:  Same.  Stage 1 endometriosis.  Abdominal adhesions.      PROCEDURE: Hysteroscopy, Dilitation and Curettage, Endometrial Ablation,  Diagnostic Laparoscopy.  Peritoneal biopsies.  Lysis of adhesions, Fulguration of endometriosis.       ANESTHESIA:  General.     ASSISTANT:  Montserrat Herzog NP  (assistance required for retraction, exposure, instrument handling, and wound closure)       COMPLICATIONS:  None.     EBL:  Minimal    SPECIMENS:  Endometrial curettings.  Peritoneal biopsies.      OPERATIVE FINDINGS:    On hysteroscopy there was a normal appearing/contoured cavity with benign appearing endometrium and no evidence submucosal fibroids or polyps.  On laparoscopy, there was  normal upper abdominal anatomy and appendix.  There were RLQ adhesions of ascending colon and omentum to anterior abdominal wall.  In the pelvis, there was a single glandular endometriotic implant in the posterior cudesac.  Otherwise, normal pelvic anatomy. Benign Pathology on frozen section curettings.      OPERATIVE DICTATION:  Patient was brought to the operating room and IV sedation was given. She was prepped and draped in the dorsal lithotomy position and her bladder drained. A weighted speculum was placed. The cervix was grasped anteriorly with a fine-tooth tenaculum and the uterus sounded. The cervix was gently dilated with Hegar dilators. Hysteroscopy was performed using a 30-degree rigid hysteroscope and normal saline distention media. Visualization was excellent. Findings were as described above.  The hysteroscope was removed.  Sharp curettage was performed in all 4 quadrants and endometrial curettings sent for frozen section review.  Results returned benign.   The NovaSure was then gently inserted into the uterine fundus and deployed. Cavitary measurements were obtained and cervical collar advanced. A  test cycle completed successfully. The energy cycle was then begun and completed successfully after 59 seconds. The NovaSure was withdrawn. Hysteroscopy was reperformed showing well ablated endometrial cavity. A uterine manipulating device was placed.    The tenaculum and weighted speculum were removed.  We then changed gloves and proceeded to the abdominal portion of the case.  A 5 mm infraumbilical skin incision was performed with a scalpel.  A Veress needle was introduced without difficulty and a water drop test was performed.  The abdomen was insufflated with several liters of carbon dioxide.  A 5 mm trocar and the laparoscope were introduced.  Visualization was excellent.  A 5 mm suprapubic port was then placed under direct visualization Exploratory laparoscopy was performed with findings as above. Lysis of adhesions was performed with the Ligasure bipolar cutting cautery device.  The endometriotic implant was biopsied and removed and the surrounding peritoneum fulgurated with monopolar cautery.   At this point, there was excellent hemostasis. The pelvis was irrigated and we proceeded to closure.  The trocars were removed under direct visualization and excess carbon dioxide expressed from the abdomen.  The subcutaneous spaces were irrigated and checked for hemostasis.  The skin incisions were closed with surgical glue.  The uterine manipulating device was removed.  There were no complications.  The patient was transferred to the recovery room in excellent stable condition.         COLT BURGOS MD

## 2019-09-11 NOTE — ANESTHESIA CARE TRANSFER NOTE
Patient: Anabella Albrecht    Procedure(s):  DILITATION AND CURETTAGE(FROZEN SECTION)HYSTEROSCOPY, ENDOMETRIAL ABLATION,Lysis of adhesions,peritoneal biopsies,fulgeration of endometriosis  LAPAROSCOPY    Diagnosis: MENORRHAGIA WITH REGULAR CYCLE, PELVIC PAIN IN FEMALE  Diagnosis Additional Information: No value filed.    Anesthesia Type:   General, ETT     Note:  Airway :Nasal Cannula  Patient transferred to:PACU  Handoff Report: Identifed the Patient, Identified the Reponsible Provider, Reviewed the pertinent medical history, Discussed the surgical course, Reviewed Intra-OP anesthesia mangement and issues during anesthesia, Set expectations for post-procedure period and Allowed opportunity for questions and acknowledgement of understanding      Vitals: (Last set prior to Anesthesia Care Transfer)    CRNA VITALS  9/11/2019 1007 - 9/11/2019 1044      9/11/2019             Pulse:  102    SpO2:  100 %                Electronically Signed By: NAMRATA Patino CRNA  September 11, 2019  10:44 AM

## 2019-09-11 NOTE — ANESTHESIA POSTPROCEDURE EVALUATION
Patient: Anabella Albrecht    Procedure(s):  DILITATION AND CURETTAGE(FROZEN SECTION)HYSTEROSCOPY, ENDOMETRIAL ABLATION,Lysis of adhesions,peritoneal biopsies,fulgeration of endometriosis  LAPAROSCOPY    Diagnosis:MENORRHAGIA WITH REGULAR CYCLE, PELVIC PAIN IN FEMALE  Diagnosis Additional Information: No value filed.    Anesthesia Type:  General, ETT    Note:  Anesthesia Post Evaluation    Patient location during evaluation: PACU, Phase 2 and Bedside  Patient participation: Able to fully participate in evaluation  Level of consciousness: awake and alert  Pain management: adequate  Airway patency: patent  Cardiovascular status: acceptable  Respiratory status: acceptable  Hydration status: stable  PONV: none     Anesthetic complications: None          Last vitals:  Vitals:    09/11/19 1145 09/11/19 1200 09/11/19 1215   BP: 128/81 113/78 121/83   Resp: 16 16 16   Temp:      SpO2: 96% 96% 96%         Electronically Signed By: Mando Khan MD  September 11, 2019  2:17 PM

## 2019-09-11 NOTE — DISCHARGE INSTRUCTIONS
Post-Anesthesia Patient Instructions    IMMEDIATELY FOLLOWING SURGERY:  Do not drive or operate machinery for the first twenty four hours after surgery.  Do not make any important decisions for twenty four hours after surgery or while taking narcotic pain medications or sedatives.  If you develop intractable nausea and vomiting or a severe headache please notify your doctor immediately.    FOLLOW-UP:  Please make an appointment with your surgeon as instructed. You do not need to follow up with anesthesia unless specifically instructed to do so.    WOUND CARE INSTRUCTIONS (if applicable):  Keep a dry clean dressing on the anesthesia/puncture wound site if there is drainage.  Once the wound has quit draining you may leave it open to air.  Generally you should leave the bandage intact for twenty four hours unless there is drainage.  If the epidural site drains for more than 36-48 hours please call the anesthesia department.    QUESTIONS?:  Please feel free to call your physician or the hospital  if you have any questions, and they will be happy to assist you.       Remove the scopolamine patch behind your left ear after 24 hours after application (9/12/19 at 8:30am).   After removing the patch, wash your hands and the area behind your ear thoroughly with soap and water.   The patch will still contain some medicine after use.   To avoid accidental contact or ingestion by children or pets, fold the used patch in half with the sticky side together and throw away in the trash out of the reach of children and pets.       Call MD prior to DC.  No driving today or while on pain meds  Pelvic rest for 2 weeks  Call Dr. Sood [phone] as necessary if problems in interim, no post op appt needed.  No heavy lifting, vigorous activity, swim, bath, exercise for 1 week

## 2019-09-11 NOTE — OR NURSING
.Patient and responsible adult given discharge instructions with no questions regarding instructions. Art score 18. Pain level 4/10.  Pt ready to leave and began to feel slightly nauseated.  Zofran 4mg ODT given before discharge.  Discharged from unit via w/c. Patient discharged to home via private vehicle with  driving.

## 2019-09-12 LAB — COPATH REPORT: NORMAL

## 2019-09-15 ENCOUNTER — MYC REFILL (OUTPATIENT)
Dept: OBGYN | Facility: OTHER | Age: 44
End: 2019-09-15

## 2019-09-15 DIAGNOSIS — Z98.890 POST-OPERATIVE STATE: ICD-10-CM

## 2019-09-16 ENCOUNTER — MYC REFILL (OUTPATIENT)
Dept: OBGYN | Facility: OTHER | Age: 44
End: 2019-09-16

## 2019-09-16 DIAGNOSIS — Z98.890 POST-OPERATIVE STATE: ICD-10-CM

## 2019-09-16 RX ORDER — HYDROCODONE BITARTRATE AND ACETAMINOPHEN 5; 325 MG/1; MG/1
1-2 TABLET ORAL EVERY 6 HOURS PRN
Qty: 25 TABLET | Refills: 0 | Status: SHIPPED | OUTPATIENT
Start: 2019-09-16 | End: 2019-09-19

## 2019-09-16 RX ORDER — IBUPROFEN 800 MG/1
800 TABLET, FILM COATED ORAL EVERY 8 HOURS PRN
Qty: 30 TABLET | Refills: 1 | Status: SHIPPED | OUTPATIENT
Start: 2019-09-16 | End: 2019-09-19

## 2019-09-16 NOTE — TELEPHONE ENCOUNTER
Hydrocodone-acetaminophen   5-325mg    Last Written Prescription Date:  9/11/2019  Last Fill Quantity: 25,   # refills: 0  Last Office Visit: 9/10/2019  Future Office visit:       Routing refill request to provider for review/approval because:  Drug not on the [facility], [facility] or [facility] refill protocol or controlled substance          Last Written Prescription Date:  9/11/2019  Last Fill Quantity: 30,   # refills: 1  Last Office Visit: 9/10/2019  Future Office visit:       Routing refill request to provider for review/approval because:  Interaction between IBU and Fluoxetine. Please advise.

## 2019-09-17 ENCOUNTER — MYC REFILL (OUTPATIENT)
Dept: FAMILY MEDICINE | Facility: OTHER | Age: 44
End: 2019-09-17

## 2019-09-17 DIAGNOSIS — F41.9 ANXIETY: ICD-10-CM

## 2019-09-17 RX ORDER — IBUPROFEN 800 MG/1
800 TABLET, FILM COATED ORAL EVERY 8 HOURS PRN
Qty: 30 TABLET | Refills: 1 | OUTPATIENT
Start: 2019-09-17

## 2019-09-17 RX ORDER — HYDROCODONE BITARTRATE AND ACETAMINOPHEN 5; 325 MG/1; MG/1
1-2 TABLET ORAL EVERY 6 HOURS PRN
Qty: 25 TABLET | Refills: 0 | OUTPATIENT
Start: 2019-09-17

## 2019-09-18 NOTE — TELEPHONE ENCOUNTER
Prozac  Last Written Prescription Date: 8/8/19  Last Fill Quantity: 30 # of Refills: 1  Last Office Visit: 9/10/19

## 2019-09-19 ENCOUNTER — MYC REFILL (OUTPATIENT)
Dept: FAMILY MEDICINE | Facility: OTHER | Age: 44
End: 2019-09-19

## 2019-09-19 ENCOUNTER — MYC REFILL (OUTPATIENT)
Dept: OBGYN | Facility: OTHER | Age: 44
End: 2019-09-19

## 2019-09-19 DIAGNOSIS — F41.9 ANXIETY: ICD-10-CM

## 2019-09-19 DIAGNOSIS — Z98.890 POST-OPERATIVE STATE: ICD-10-CM

## 2019-09-20 RX ORDER — IBUPROFEN 800 MG/1
800 TABLET, FILM COATED ORAL EVERY 8 HOURS PRN
Qty: 30 TABLET | Refills: 1 | OUTPATIENT
Start: 2019-09-20

## 2019-09-20 RX ORDER — IBUPROFEN 800 MG/1
800 TABLET, FILM COATED ORAL EVERY 8 HOURS PRN
Qty: 30 TABLET | Refills: 1 | Status: SHIPPED | OUTPATIENT
Start: 2019-09-20 | End: 2019-11-25

## 2019-09-20 RX ORDER — HYDROCODONE BITARTRATE AND ACETAMINOPHEN 5; 325 MG/1; MG/1
1-2 TABLET ORAL EVERY 6 HOURS PRN
Qty: 25 TABLET | Refills: 0 | Status: SHIPPED | OUTPATIENT
Start: 2019-09-20 | End: 2019-10-02

## 2019-09-20 NOTE — TELEPHONE ENCOUNTER
Hydrocodone-acetaminophen       Last Written Prescription Date:  9/16/2019  Last Fill Quantity: 25,   # refills: 0  Last Office Visit: 9/10/2019  Future Office visit:       Routing refill request to provider for review/approval because:  Drug not on the [facility], [facility] or [facility] refill protocol or controlled substance

## 2019-10-01 PROBLEM — Z78.9 USES BIRTH CONTROL: Status: ACTIVE | Noted: 2017-05-31

## 2019-10-02 ENCOUNTER — MYC REFILL (OUTPATIENT)
Dept: OBGYN | Facility: OTHER | Age: 44
End: 2019-10-02

## 2019-10-02 DIAGNOSIS — Z98.890 POST-OPERATIVE STATE: ICD-10-CM

## 2019-10-04 RX ORDER — HYDROCODONE BITARTRATE AND ACETAMINOPHEN 5; 325 MG/1; MG/1
1-2 TABLET ORAL EVERY 6 HOURS PRN
Qty: 25 TABLET | Refills: 0 | Status: SHIPPED | OUTPATIENT
Start: 2019-10-04 | End: 2019-10-21

## 2019-10-04 NOTE — TELEPHONE ENCOUNTER
HYDROcodone-acetaminophen (NORCO) 5-325 MG tablet      Last Written Prescription Date:  09/20/19  Last Fill Quantity: 25,   # refills: 0  Last Office Visit: 05/24/18  Future Office visit:

## 2019-10-21 ENCOUNTER — MYC REFILL (OUTPATIENT)
Dept: OBGYN | Facility: OTHER | Age: 44
End: 2019-10-21

## 2019-10-21 DIAGNOSIS — Z98.890 POST-OPERATIVE STATE: ICD-10-CM

## 2019-10-23 RX ORDER — HYDROCODONE BITARTRATE AND ACETAMINOPHEN 5; 325 MG/1; MG/1
1-2 TABLET ORAL EVERY 6 HOURS PRN
Qty: 25 TABLET | Refills: 0 | Status: SHIPPED | OUTPATIENT
Start: 2019-10-23 | End: 2019-11-25

## 2019-10-23 NOTE — TELEPHONE ENCOUNTER
hydrocodone      Last Written Prescription Date:  10/4/19  Last Fill Quantity: 25,   # refills: 0  Last Office Visit: 9/10/19  Future Office visit:       Routing refill request to provider for review/approval because:  Drug not on the [facility], [facility] or [facility] refill protocol or controlled substance  '

## 2019-11-05 ENCOUNTER — MYC REFILL (OUTPATIENT)
Dept: FAMILY MEDICINE | Facility: OTHER | Age: 44
End: 2019-11-05

## 2019-11-05 DIAGNOSIS — F41.9 ANXIETY: ICD-10-CM

## 2019-11-25 ENCOUNTER — MYC REFILL (OUTPATIENT)
Dept: OBGYN | Facility: OTHER | Age: 44
End: 2019-11-25

## 2019-11-25 ENCOUNTER — MYC REFILL (OUTPATIENT)
Dept: FAMILY MEDICINE | Facility: OTHER | Age: 44
End: 2019-11-25

## 2019-11-25 DIAGNOSIS — F41.9 ANXIETY: ICD-10-CM

## 2019-11-25 DIAGNOSIS — Z98.890 POST-OPERATIVE STATE: ICD-10-CM

## 2019-11-27 RX ORDER — HYDROCODONE BITARTRATE AND ACETAMINOPHEN 5; 325 MG/1; MG/1
1-2 TABLET ORAL EVERY 6 HOURS PRN
Qty: 25 TABLET | Refills: 0 | Status: SHIPPED | OUTPATIENT
Start: 2019-11-27 | End: 2020-01-06

## 2019-11-27 RX ORDER — IBUPROFEN 800 MG/1
800 TABLET, FILM COATED ORAL EVERY 8 HOURS PRN
Qty: 30 TABLET | Refills: 1 | Status: SHIPPED | OUTPATIENT
Start: 2019-11-27 | End: 2020-01-06

## 2019-11-27 NOTE — TELEPHONE ENCOUNTER
HYDROcodone-acetaminophen (NORCO) 5-325 MG tablet      Last Written Prescription Date:  10/23/19  Last Fill Quantity: 25,   # refills: 0  Last Office Visit: 5/24/18  Future Office visit:       Routing refill request to provider for review/approval because:  Drug not on the [facility], [facility] or [facility] refill protocol or controlled substance    Due for f/u? Please advise. Thank you!

## 2020-01-06 ENCOUNTER — OFFICE VISIT (OUTPATIENT)
Dept: OTOLARYNGOLOGY | Facility: OTHER | Age: 45
End: 2020-01-06
Attending: OTOLARYNGOLOGY
Payer: COMMERCIAL

## 2020-01-06 ENCOUNTER — OFFICE VISIT (OUTPATIENT)
Dept: AUDIOLOGY | Facility: OTHER | Age: 45
End: 2020-01-06
Attending: AUDIOLOGIST
Payer: COMMERCIAL

## 2020-01-06 VITALS
HEIGHT: 63 IN | WEIGHT: 160 LBS | BODY MASS INDEX: 28.35 KG/M2 | SYSTOLIC BLOOD PRESSURE: 110 MMHG | HEART RATE: 78 BPM | OXYGEN SATURATION: 99 % | DIASTOLIC BLOOD PRESSURE: 72 MMHG | TEMPERATURE: 98 F

## 2020-01-06 DIAGNOSIS — H93.8X3 EAR FULLNESS, BILATERAL: ICD-10-CM

## 2020-01-06 DIAGNOSIS — K11.23 CHRONIC PAROTITIS: ICD-10-CM

## 2020-01-06 DIAGNOSIS — H90.3 ASNHL (ASYMMETRICAL SENSORINEURAL HEARING LOSS): ICD-10-CM

## 2020-01-06 DIAGNOSIS — Z01.10 EXAMINATION OF EARS AND HEARING: Primary | ICD-10-CM

## 2020-01-06 DIAGNOSIS — J30.89 PERENNIAL ALLERGIC RHINITIS: Primary | ICD-10-CM

## 2020-01-06 DIAGNOSIS — M26.609 TMJ (TEMPOROMANDIBULAR JOINT SYNDROME): ICD-10-CM

## 2020-01-06 PROCEDURE — 92504 EAR MICROSCOPY EXAMINATION: CPT | Performed by: OTOLARYNGOLOGY

## 2020-01-06 PROCEDURE — 99203 OFFICE O/P NEW LOW 30 MIN: CPT | Mod: 25 | Performed by: OTOLARYNGOLOGY

## 2020-01-06 PROCEDURE — 92550 TYMPANOMETRY & REFLEX THRESH: CPT | Performed by: AUDIOLOGIST

## 2020-01-06 PROCEDURE — 92557 COMPREHENSIVE HEARING TEST: CPT | Performed by: AUDIOLOGIST

## 2020-01-06 RX ORDER — CETIRIZINE HYDROCHLORIDE 10 MG/1
10 TABLET ORAL DAILY
Qty: 90 TABLET | Refills: 12 | Status: SHIPPED | OUTPATIENT
Start: 2020-01-06 | End: 2021-05-07

## 2020-01-06 ASSESSMENT — PAIN SCALES - GENERAL: PAINLEVEL: NO PAIN (0)

## 2020-01-06 ASSESSMENT — MIFFLIN-ST. JEOR: SCORE: 1354.89

## 2020-01-06 NOTE — PATIENT INSTRUCTIONS
Thank you for allowing Dr. Peck and our ENT team to participate in your care.  If your medications are too expensive, please give the nurse a call.  We can possibly change this medication.  If you have a scheduling or an appointment question please contact our Health Unit Coordinator at their direct line [phone].   ALL nursing questions or concerns can be directed to your ENT nurse at: [phone] - [place]    Follow up for an audiogram with fluctuating hearing loss  No CATS Diet (Caffeine, Alcohol, Tobacco, Salt)  Keep a ear symptom journal  Follow TMJ Precautions  Complete MRI IAC  Follow up with KIKI Chinchilla in 3-4 Months        Maintain adequate oral hydration, sialagogues (lemon wedges, lemon drops, sour candies), gland massage.  1/2 strength peroxide oral rinses if dilation performed today.  We discussed theories on stone development, including chronic sialadenitis or stricture leading to stagnant saliva and irritation to surrounding ductal tissue which may lead to stones.    If no improvement, proceed with submandibular gland excision.  This was discussed today.  Follow up as indicated within 1 month.
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
## 2020-01-06 NOTE — PROGRESS NOTES
Audiology Evaluation Completed. Please refer SCANNED AUDIOGRAM and/or TYMPANOGRAM for BACKGROUND, RESULTS, RECOMMENDATIONS.    Hearing protection recommended.      Christina LAMAS, [facility]  Audiologist #[id number]

## 2020-01-06 NOTE — NURSING NOTE
"Chief Complaint   Patient presents with     Consult     Ear Problem; Self Referral       Initial /72   Pulse 78   Temp 98  F (36.7  C) (Tympanic)   Ht 1.6 m (5' 3\")   Wt 72.6 kg (160 lb)   SpO2 99%   BMI 28.34 kg/m   Estimated body mass index is 28.34 kg/m  as calculated from the following:    Height as of this encounter: 1.6 m (5' 3\").    Weight as of this encounter: 72.6 kg (160 lb).  Medication Reconciliation: complete  Lakeisha Cano LPN  "
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
## 2020-01-13 ENCOUNTER — HOSPITAL ENCOUNTER (OUTPATIENT)
Dept: MRI IMAGING | Facility: HOSPITAL | Age: 45
Discharge: HOME | End: 2020-01-13
Attending: OTOLARYNGOLOGY | Admitting: OTOLARYNGOLOGY
Payer: COMMERCIAL

## 2020-01-13 DIAGNOSIS — H90.3 ASNHL (ASYMMETRICAL SENSORINEURAL HEARING LOSS): ICD-10-CM

## 2020-01-13 PROCEDURE — A9585 GADOBUTROL INJECTION: HCPCS | Performed by: RADIOLOGY

## 2020-01-13 PROCEDURE — 70543 MRI ORBT/FAC/NCK W/O &W/DYE: CPT | Mod: TC

## 2020-01-13 PROCEDURE — 25500064 ZZH RX 255 OP 636: Performed by: RADIOLOGY

## 2020-01-13 RX ORDER — GADOBUTROL 604.72 MG/ML
7.5 INJECTION INTRAVENOUS ONCE
Status: COMPLETED | OUTPATIENT
Start: 2020-01-13 | End: 2020-01-13

## 2020-01-13 RX ADMIN — GADOBUTROL 7.5 ML: 604.72 INJECTION INTRAVENOUS at 18:10

## 2020-03-02 ENCOUNTER — HEALTH MAINTENANCE LETTER (OUTPATIENT)
Age: 45
End: 2020-03-02

## 2020-03-19 ENCOUNTER — VIRTUAL VISIT (OUTPATIENT)
Dept: OTOLARYNGOLOGY | Facility: OTHER | Age: 45
End: 2020-03-19
Attending: OTOLARYNGOLOGY
Payer: COMMERCIAL

## 2020-03-19 VITALS — BODY MASS INDEX: 28.35 KG/M2 | WEIGHT: 160 LBS | HEIGHT: 63 IN

## 2020-03-19 DIAGNOSIS — H81.02: ICD-10-CM

## 2020-03-19 DIAGNOSIS — H81.02: Primary | ICD-10-CM

## 2020-03-19 DIAGNOSIS — H93.8X2 EAR FULLNESS, LEFT: ICD-10-CM

## 2020-03-19 DIAGNOSIS — H93.12 TINNITUS, LEFT: ICD-10-CM

## 2020-03-19 PROCEDURE — 99213 OFFICE O/P EST LOW 20 MIN: CPT | Mod: 95 | Performed by: OTOLARYNGOLOGY

## 2020-03-19 ASSESSMENT — MIFFLIN-ST. JEOR: SCORE: 1354.89

## 2020-03-19 ASSESSMENT — PAIN SCALES - GENERAL: PAINLEVEL: NO PAIN (0)

## 2020-03-19 NOTE — PATIENT INSTRUCTIONS
Thank you for allowing Dr. Peck and our ENT team to participate in your care.  If your medications are too expensive, please give the nurse a call.  We can possibly change this medication.  If you have a scheduling or an appointment question please contact our Health Unit Coordinator at their direct line [phone].   ALL nursing questions or concerns can be directed to your ENT nurse at: [phone] - [place]    Start Dyazide and take as prescribed    Continue Zyrtec at night    No CATS Diet (caffeine, alcohol, tobacco, salt)    Follow up for an Audiogram with any sudden hearing loss    Complete Audiogram in 2 months (they will call you to set this up)    Prior to hearing test, complete potassium lab (this order is in, you will just have to present to the lab at your convenience)

## 2020-03-19 NOTE — PROGRESS NOTES
"Anabella Albrecht complains of    Chief Complaint   Patient presents with     RECHECK     Follow Up Tinnitus and ASNHL       The patient has been notified of following:     \"This telephone visit will be conducted via a call between you and your physician/provider. We have found that certain health care needs can be provided without the need for a physical exam.  This service lets us provide the care you need with a short phone conversation.  If a prescription is necessary we can send it directly to your pharmacy.  If lab work is needed we can place an order for that and you can then stop by our lab to have the test done at a later time.    If during the course of the call the physician/provider feels a telephone visit is not appropriate, you will not be charged for this service.\"       I have reviewed and updated the patient's Past Medical History, Social History, Family History and Medication List.      Progress Telephone Visit  Anabella Albrecht is a 44 year old female who is being evaluated via a billable telephone visit.    Anabella continues to have intermittent left aural fullness associated with left tinnitus and left hearing loss    She denies any sudden drop in hearing in her left ear since her last visit     no vertigo  No facial numbness or facial weakness  No dysphagia    Audiogram dated January 6, 2020 shows normal thresholds aside from a mild high-frequency asymmetrical notch at 6K on the left type a tympanograms 100% discrimination SRT 5 dB bilaterally  There is no  low-frequency sensorineural hearing loss.  She was started on Zyrtec and instructed on low-salt diet she does not smoke and there is only rare use of alcoho    l she was drinking a bit more caffeine at her last visit and has cut back with no appreciable difference in her symptoms she does have TMJ which is been stable and managed by her dentist  She aas noticed some improvement on Zyrtec but continues to be symptomatic with her left ear fullness " tinnitus and hearing loss    MRI IAC was negative dated January 13, 2020 this was again reviewed with her today      ALLERGIES  Seasonal allergies      Assessment/Plan:  1. Cochlear Meniere syndrome of left ear  Not typical audiogram findings but clinically c/w cochlear menieres, will treat  Possible side effects of Dyazide including hypokalemia,  hypotension were discussed.  she was encouraged to get a pharmacy consultation when she picks up her medication    - triamterene-HCTZ (DYAZIDE) 50-25 MG capsule; Take 1 capsule by mouth every morning  Dispense: 60 capsule; Refill: 4    2. Tinnitus, left      3. Ear fullness, left      Start Dyazide and take as prescribed    Continue Zyrtec at night    No CATS Diet (caffeine, alcohol, tobacco, salt)    Follow up for an Audiogram with any sudden hearing loss    Complete Audiogram in 2 months (they will call you to set this up)    Prior to hearing test, complete potassium lab (this order is in, you will just have to present to the lab at your convenience)      Phone call duration: 15 minutes    Bethany Peck D.O.  Otolaryngology/Head and Neck Surgery  Allergy

## 2020-03-19 NOTE — TELEPHONE ENCOUNTER
LOV Has virtual visit today, triamterene-HCTZ (DYAZIDE) 50-25 MG capsule Pharmacy states this dose is no longer made, available doses are 37.5/25mg or 75/50mg

## 2020-03-23 DIAGNOSIS — H81.09: Primary | ICD-10-CM

## 2020-03-23 RX ORDER — TRIAMTERENE AND HYDROCHLOROTHIAZIDE 37.5; 25 MG/1; MG/1
1 CAPSULE ORAL EVERY MORNING
Qty: 60 CAPSULE | Refills: 0 | Status: SHIPPED | OUTPATIENT
Start: 2020-03-23 | End: 2020-06-30 | Stop reason: ALTCHOICE

## 2020-03-23 NOTE — TELEPHONE ENCOUNTER
Triamterene-hydrochlorothiazide ( Dyazide ) 50-25 MG capsule    Last Written Prescription Date:  3/19/20  Last Fill Quantity: 60,   # refills: 4  Last Office Visit: 9/10/19  Future Office visit:       Routing refill request to provider for review/approval because:  Medication is reported/historical    Would like Rx change to 37.5-25 mg capsules, 50-25 does not exisist any longer.
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
## 2020-06-30 ENCOUNTER — OFFICE VISIT (OUTPATIENT)
Dept: FAMILY MEDICINE | Facility: OTHER | Age: 45
End: 2020-06-30
Attending: FAMILY MEDICINE
Payer: COMMERCIAL

## 2020-06-30 VITALS
TEMPERATURE: 98.6 F | DIASTOLIC BLOOD PRESSURE: 64 MMHG | SYSTOLIC BLOOD PRESSURE: 112 MMHG | WEIGHT: 175 LBS | HEART RATE: 64 BPM | HEIGHT: 63 IN | BODY MASS INDEX: 31.01 KG/M2 | OXYGEN SATURATION: 96 %

## 2020-06-30 DIAGNOSIS — H81.02: ICD-10-CM

## 2020-06-30 DIAGNOSIS — R45.86 MOOD CHANGE: ICD-10-CM

## 2020-06-30 DIAGNOSIS — H81.09: ICD-10-CM

## 2020-06-30 DIAGNOSIS — Z13.220 LIPID SCREENING: ICD-10-CM

## 2020-06-30 DIAGNOSIS — Z00.00 ROUTINE GENERAL MEDICAL EXAMINATION AT A HEALTH CARE FACILITY: Primary | ICD-10-CM

## 2020-06-30 DIAGNOSIS — Z12.4 SCREENING FOR MALIGNANT NEOPLASM OF CERVIX: ICD-10-CM

## 2020-06-30 DIAGNOSIS — Z13.1 ENCOUNTER FOR SCREENING EXAMINATION FOR IMPAIRED GLUCOSE REGULATION AND DIABETES MELLITUS: ICD-10-CM

## 2020-06-30 DIAGNOSIS — Z12.31 ENCOUNTER FOR SCREENING MAMMOGRAM FOR BREAST CANCER: ICD-10-CM

## 2020-06-30 LAB
CHOLEST SERPL-MCNC: 178 MG/DL
GLUCOSE SERPL-MCNC: 96 MG/DL (ref 70–99)
HDLC SERPL-MCNC: 52 MG/DL
LDLC SERPL CALC-MCNC: 102 MG/DL
NONHDLC SERPL-MCNC: 126 MG/DL
TRIGL SERPL-MCNC: 120 MG/DL

## 2020-06-30 PROCEDURE — 82947 ASSAY GLUCOSE BLOOD QUANT: CPT | Performed by: FAMILY MEDICINE

## 2020-06-30 PROCEDURE — 80061 LIPID PANEL: CPT | Performed by: FAMILY MEDICINE

## 2020-06-30 PROCEDURE — 99396 PREV VISIT EST AGE 40-64: CPT | Performed by: FAMILY MEDICINE

## 2020-06-30 PROCEDURE — 36415 COLL VENOUS BLD VENIPUNCTURE: CPT | Performed by: FAMILY MEDICINE

## 2020-06-30 PROCEDURE — 87624 HPV HI-RISK TYP POOLED RSLT: CPT | Performed by: FAMILY MEDICINE

## 2020-06-30 PROCEDURE — G0123 SCREEN CERV/VAG THIN LAYER: HCPCS | Performed by: FAMILY MEDICINE

## 2020-06-30 RX ORDER — TRIAMTERENE AND HYDROCHLOROTHIAZIDE 37.5; 25 MG/1; MG/1
1 CAPSULE ORAL EVERY MORNING
Qty: 60 CAPSULE | Refills: 0 | Status: CANCELLED | OUTPATIENT
Start: 2020-06-30

## 2020-06-30 ASSESSMENT — ANXIETY QUESTIONNAIRES
6. BECOMING EASILY ANNOYED OR IRRITABLE: NOT AT ALL
2. NOT BEING ABLE TO STOP OR CONTROL WORRYING: NOT AT ALL
GAD7 TOTAL SCORE: 0
7. FEELING AFRAID AS IF SOMETHING AWFUL MIGHT HAPPEN: NOT AT ALL
4. TROUBLE RELAXING: NOT AT ALL
1. FEELING NERVOUS, ANXIOUS, OR ON EDGE: NOT AT ALL
3. WORRYING TOO MUCH ABOUT DIFFERENT THINGS: NOT AT ALL
5. BEING SO RESTLESS THAT IT IS HARD TO SIT STILL: NOT AT ALL

## 2020-06-30 ASSESSMENT — PAIN SCALES - GENERAL: PAINLEVEL: NO PAIN (0)

## 2020-06-30 ASSESSMENT — PATIENT HEALTH QUESTIONNAIRE - PHQ9: SUM OF ALL RESPONSES TO PHQ QUESTIONS 1-9: 0

## 2020-06-30 ASSESSMENT — MIFFLIN-ST. JEOR: SCORE: 1422.92

## 2020-06-30 NOTE — NURSING NOTE
"Chief Complaint   Patient presents with     Physical       Initial /64   Pulse 64   Temp 98.6  F (37  C)   Ht 1.6 m (5' 3\")   Wt 79.4 kg (175 lb)   SpO2 96%   BMI 31.00 kg/m   Estimated body mass index is 31 kg/m  as calculated from the following:    Height as of this encounter: 1.6 m (5' 3\").    Weight as of this encounter: 79.4 kg (175 lb).  Medication Reconciliation: complete  Brayan Atwood LPN  "

## 2020-07-01 ASSESSMENT — ANXIETY QUESTIONNAIRES: GAD7 TOTAL SCORE: 0

## 2020-07-08 LAB
COPATH REPORT: NORMAL
PAP: NORMAL

## 2020-07-09 LAB
FINAL DIAGNOSIS: NORMAL
HPV HR 12 DNA CVX QL NAA+PROBE: NEGATIVE
HPV16 DNA SPEC QL NAA+PROBE: NEGATIVE
HPV18 DNA SPEC QL NAA+PROBE: NEGATIVE
SPECIMEN DESCRIPTION: NORMAL
SPECIMEN SOURCE CVX/VAG CYTO: NORMAL

## 2020-07-27 ENCOUNTER — ANCILLARY PROCEDURE (OUTPATIENT)
Dept: MAMMOGRAPHY | Facility: OTHER | Age: 45
End: 2020-07-27
Attending: FAMILY MEDICINE
Payer: COMMERCIAL

## 2020-07-27 DIAGNOSIS — Z12.31 ENCOUNTER FOR SCREENING MAMMOGRAM FOR BREAST CANCER: ICD-10-CM

## 2020-07-27 PROCEDURE — 77067 SCR MAMMO BI INCL CAD: CPT | Mod: TC

## 2020-07-27 PROCEDURE — 77063 BREAST TOMOSYNTHESIS BI: CPT | Mod: TC

## 2021-04-05 ENCOUNTER — NURSE TRIAGE (OUTPATIENT)
Dept: FAMILY MEDICINE | Facility: OTHER | Age: 46
End: 2021-04-05

## 2021-04-05 DIAGNOSIS — Z20.822 EXPOSURE TO COVID-19 VIRUS: Primary | ICD-10-CM

## 2021-04-05 NOTE — TELEPHONE ENCOUNTER
Reason for Disposition    [1] CLOSE CONTACT COVID-19 EXPOSURE within last 14 days AND [2] NO symptoms    Additional Information    Negative: COVID-19 lab test positive    Negative: [1] Lives with someone known to have influenza (flu test positive) AND [2] flu-like symptoms (e.g., cough, runny nose, sore throat, SOB; with or without fever)    Negative: [1] Symptoms of COVID-19 (e.g., cough, fever, SOB, or others) AND [2] HCP diagnosed COVID-19 based on symptoms    Negative: [1] Symptoms of COVID-19 (e.g., cough, fever, SOB, or others) AND [2] lives in an area with community spread    Negative: [1] Symptoms of COVID-19 (e.g., cough, fever, SOB, or others) AND [2] within 14 days of EXPOSURE (close contact) with diagnosed or suspected COVID-19 patient    Negative: [1] Symptoms of COVID-19 (e.g., cough, fever, SOB, or others) AND [2] within 14 days of travel from high-risk area for COVID-19 community spread (identified by CDC)    Negative: [1] Difficulty breathing (shortness of breath) occurs AND [2] onset > 14 days after COVID-19 EXPOSURE (Close Contact)    Negative: [1] Dry cough occurs AND [2] onset > 14 days after COVID-19 EXPOSURE    Negative: [1] Wet cough (i.e., white-yellow, yellow, green, or osman colored sputum) AND [2] onset > 14 days after COVID-19 EXPOSURE    Negative: [1] Common cold symptoms AND [2] onset > 14 days after COVID-19 EXPOSURE    Negative: COVID-19 vaccine reaction suspected (e.g., fever, headache, muscle aches) occurring during days 1-3 after getting vaccine    Negative: COVID-19 vaccine, questions about    Negative: [1] CLOSE CONTACT COVID-19 EXPOSURE within last 14 days AND [2] needs COVID-19 lab test to return to work AND [3] NO symptoms    Negative: [1] CLOSE CONTACT COVID-19 EXPOSURE within last 14 days AND [2] exposed person is a  (e.g., police or paramedic) AND [3] NO symptoms    Negative: [1] CLOSE CONTACT COVID-19 EXPOSURE within last 14 days AND [2] exposed person  "is a healthcare worker who was NOT using all recommended personal protective equipment (i.e., a respirator-N95 mask, eye protection, gloves, and gown) AND [3] NO symptoms    Negative: [1] Living or working in a correctional facility, long-term care facility, or shelter (i.e., congregate setting; densely populated) AND [2] where an outbreak has occurred AND [3] NO symptoms    Answer Assessment - Initial Assessment Questions  1. COVID-19 CLOSE CONTACT: \"Who is the person with the confirmed or suspected COVID-19 infection that you were exposed to?\"      Spouse   2. PLACE of CONTACT: \"Where were you when you were exposed to COVID-19?\" (e.g., home, school, medical waiting room; which city?)     home  3. TYPE of CONTACT: \"How much contact was there?\" (e.g., sitting next to, live in same house, work in same office, same building)      Live in same home  4. DURATION of CONTACT: \"How long were you in contact with the COVID-19 patient?\" (e.g., a few seconds, passed by person, a few minutes, 15 minutes or longer, live with the patient)      1/2 hour  5. MASK: \"Were you wearing a mask?\" \"Was the other person wearing a mask?\" Note: wearing a mask reduces the risk of an otherwise close contact.      note  6. DATE of CONTACT: \"When did you have contact with a COVID-19 patient?\" (e.g., how many days ago)      4.4.2021  7. COMMUNITY SPREAD: \"Are there lots of cases of COVID-19 (community spread) where you live?\" (See public health department website, if unsure)       yes8. SYMPTOMS: \"Do you have any symptoms?\" (e.g., fever, cough, breathing difficulty, loss of taste or smell)      no  9. PREGNANCY OR POSTPARTUM: \"Is there any chance you are pregnant?\" \"When was your last menstrual period?\" \"Did you deliver in the last 2 weeks?\"      no  10. HIGH RISK: \"Do you have any heart or lung problems? Do you have a weak immune system?\" (e.g., heart failure, COPD, asthma, HIV positive, chemotherapy, renal failure, diabetes mellitus, sickle " "cell anemia, obesity)        no  11. TRAVEL: \"Have you traveled out of the country recently?\" If so, \"When and where?\" Also ask about out-of-state travel, since the CDC has identified some high-risk cities for community spread in the  Note: Travel becomes less relevant if there is widespread community transmission where the patient lives.        no    Protocols used: CORONAVIRUS (COVID-19) EXPOSURE-A-AH 1.3      "

## 2021-04-20 DIAGNOSIS — R45.86 MOOD CHANGE: ICD-10-CM

## 2021-04-27 NOTE — PROGRESS NOTES
"    Assessment & Plan     Anxiety state  Will change to lexapro 10 mg daily and see if this works better  Discussed counseling, she will contact [facility] on her own to schedule an appointment   - escitalopram (LEXAPRO) 10 MG tablet; Take 1 tablet (10 mg) by mouth daily    Strain of lumbar region, initial encounter  Continue with chiropractic care  Add nabumetone bid with food and flexeril as needed. Advised flexeril will make her tired and she should not drive with it.   - nabumetone (RELAFEN) 500 MG tablet; Take 1 tablet (500 mg) by mouth 2 times daily  - cyclobenzaprine (FLEXERIL) 10 MG tablet; Take 1 tablet (10 mg) by mouth 3 times daily as needed for muscle spasms      25 minutes spent on the date of the encounter doing chart review, patient visit and documentation        BMI:   Estimated body mass index is 29.8 kg/m  as calculated from the following:    Height as of 6/30/20: 1.6 m (5' 3\").    Weight as of this encounter: 76.3 kg (168 lb 3.2 oz).           Return in about 4 weeks (around 6/10/2021) for anxiety.    Elis Webster MD  [facility] - CHARLIE Kyle is a 45 year old who presents for the following health issues     HPI     Depression Followup    How are you doing with your depression since your last visit? No change    Are you having other symptoms that might be associated with depression? No    Have you had a significant life event?  Parenting issues     Are you feeling anxious or having panic attacks?   Yes:  anxiety has gotten worse    Do you have any concerns with your use of alcohol or other drugs? No    Social History     Tobacco Use     Smoking status: Never Smoker     Smokeless tobacco: Never Used   Substance Use Topics     Alcohol use: Yes     Comment: Rarely      Drug use: No     PHQ 5/24/2018 6/30/2020 5/13/2021   PHQ-9 Total Score 3 0 6   Q9: Thoughts of better off dead/self-harm past 2 weeks Not at all Not at all Not at all     JASON-7 SCORE 5/24/2018 6/30/2020 " 5/13/2021   Total Score 3 0 15     Last PHQ-9 5/13/2021   1.  Little interest or pleasure in doing things 1   2.  Feeling down, depressed, or hopeless 1   3.  Trouble falling or staying asleep, or sleeping too much 1   4.  Feeling tired or having little energy 2   5.  Poor appetite or overeating 1   6.  Feeling bad about yourself 0   7.  Trouble concentrating 0   8.  Moving slowly or restless 0   Q9: Thoughts of better off dead/self-harm past 2 weeks 0   PHQ-9 Total Score 6   Difficulty at work, home, or with people Somewhat difficult     JASON-7  5/13/2021   1. Feeling nervous, anxious, or on edge 3   2. Not being able to stop or control worrying 3   3. Worrying too much about different things 3   4. Trouble relaxing 3   5. Being so restless that it is hard to sit still 1   6. Becoming easily annoyed or irritable 1   7. Feeling afraid, as if something awful might happen 1   JASON-7 Total Score 15   If you checked any problems, how difficult have they made it for you to do your work, take care of things at home, or get along with other people? Very difficult       Suicide Assessment Five-step Evaluation and Treatment (SAFE-T)      Lumbar pain  Started 4 days ago when she could hardy stand. Her chiropractor adjusted her so that she could move. No trauma or injury. She has had issues in the past since the birth of her first child. She has had some paresthesias to the left thigh as well but not at this time     Review of Systems   Constitutional, HEENT, cardiovascular, pulmonary, gi and gu systems are negative, except as otherwise noted.      Objective    /64 (BP Location: Left arm, Patient Position: Sitting, Cuff Size: Adult Regular)   Pulse 69   Temp 96.9  F (36.1  C) (Tympanic)   Resp 18   Wt 76.3 kg (168 lb 3.2 oz)   SpO2 99%   BMI 29.80 kg/m    Body mass index is 29.8 kg/m .  Physical Exam   GENERAL APPEARANCE: healthy, alert and no distress  ORTHO: Lumber/Thoracic Spine Exam: Tender:  left para lumbar  muscles, right para lumbar muscles, left sciatic notch, right sciatic notch  Non-tender:  lumbar spinous processes, lumbar facet joints, left SI joint, right SI joint  Range of Motion:  lumbar flexion  decreased, painful, lumbar extension  decreased, painful  Strength:  able to heel walk, able to toe walk  Special tests:  positive left straight leg raise    SKIN: no suspicious lesions or rashes  NEURO: Normal strength and tone, mentation intact, speech normal and DTR symmetrically normal in lower extremities  PSYCH: mentation appears normal and anxious

## 2021-05-07 DIAGNOSIS — J30.89 PERENNIAL ALLERGIC RHINITIS: ICD-10-CM

## 2021-05-07 RX ORDER — CETIRIZINE HYDROCHLORIDE 10 MG/1
TABLET ORAL
Qty: 90 TABLET | Refills: 0 | Status: SHIPPED | OUTPATIENT
Start: 2021-05-07 | End: 2021-11-17

## 2021-05-07 NOTE — TELEPHONE ENCOUNTER
Zyrtec 10mg      Last Written Prescription Date:  1/6/20  Last Fill Quantity 90: ,   # refills: 12  Last Office Visit: 6/30/20  Future Office visit:    Next 5 appointments (look out 90 days)    May 13, 2021  3:30 PM  (Arrive by 3:15 PM)  SHORT with Elis Webster MD  [facility] ([facility] ) [address]  [place] [address]  [phone]           Routing refill request to provider for review/approval because:  Drug not active on patient's medication list

## 2021-05-13 ENCOUNTER — OFFICE VISIT (OUTPATIENT)
Dept: FAMILY MEDICINE | Facility: OTHER | Age: 46
End: 2021-05-13
Attending: FAMILY MEDICINE
Payer: COMMERCIAL

## 2021-05-13 VITALS
BODY MASS INDEX: 29.8 KG/M2 | TEMPERATURE: 96.9 F | WEIGHT: 168.2 LBS | DIASTOLIC BLOOD PRESSURE: 64 MMHG | RESPIRATION RATE: 18 BRPM | HEART RATE: 69 BPM | OXYGEN SATURATION: 99 % | SYSTOLIC BLOOD PRESSURE: 122 MMHG

## 2021-05-13 DIAGNOSIS — F41.1 ANXIETY STATE: Primary | ICD-10-CM

## 2021-05-13 DIAGNOSIS — S39.012A STRAIN OF LUMBAR REGION, INITIAL ENCOUNTER: ICD-10-CM

## 2021-05-13 PROCEDURE — 99213 OFFICE O/P EST LOW 20 MIN: CPT | Performed by: FAMILY MEDICINE

## 2021-05-13 RX ORDER — NABUMETONE 500 MG/1
500 TABLET, FILM COATED ORAL 2 TIMES DAILY
Qty: 60 TABLET | Refills: 1 | Status: SHIPPED | OUTPATIENT
Start: 2021-05-13 | End: 2021-06-14

## 2021-05-13 RX ORDER — ESCITALOPRAM OXALATE 10 MG/1
10 TABLET ORAL DAILY
Qty: 30 TABLET | Refills: 1 | Status: SHIPPED | OUTPATIENT
Start: 2021-05-13 | End: 2021-06-14 | Stop reason: ALTCHOICE

## 2021-05-13 RX ORDER — CYCLOBENZAPRINE HCL 10 MG
10 TABLET ORAL 3 TIMES DAILY PRN
Qty: 20 TABLET | Refills: 0 | Status: SHIPPED | OUTPATIENT
Start: 2021-05-13 | End: 2021-06-14

## 2021-05-13 ASSESSMENT — PAIN SCALES - GENERAL: PAINLEVEL: WORST PAIN (10)

## 2021-05-13 ASSESSMENT — ANXIETY QUESTIONNAIRES
4. TROUBLE RELAXING: NEARLY EVERY DAY
7. FEELING AFRAID AS IF SOMETHING AWFUL MIGHT HAPPEN: SEVERAL DAYS
3. WORRYING TOO MUCH ABOUT DIFFERENT THINGS: NEARLY EVERY DAY
IF YOU CHECKED OFF ANY PROBLEMS ON THIS QUESTIONNAIRE, HOW DIFFICULT HAVE THESE PROBLEMS MADE IT FOR YOU TO DO YOUR WORK, TAKE CARE OF THINGS AT HOME, OR GET ALONG WITH OTHER PEOPLE: VERY DIFFICULT
5. BEING SO RESTLESS THAT IT IS HARD TO SIT STILL: SEVERAL DAYS
6. BECOMING EASILY ANNOYED OR IRRITABLE: SEVERAL DAYS
GAD7 TOTAL SCORE: 15
1. FEELING NERVOUS, ANXIOUS, OR ON EDGE: NEARLY EVERY DAY
2. NOT BEING ABLE TO STOP OR CONTROL WORRYING: NEARLY EVERY DAY

## 2021-05-13 ASSESSMENT — PATIENT HEALTH QUESTIONNAIRE - PHQ9: SUM OF ALL RESPONSES TO PHQ QUESTIONS 1-9: 6

## 2021-05-13 NOTE — NURSING NOTE
"Chief Complaint   Patient presents with     Depression     Anxiety       Initial /64 (BP Location: Left arm, Patient Position: Sitting, Cuff Size: Adult Regular)   Pulse 69   Temp 96.9  F (36.1  C) (Tympanic)   Resp 18   Wt 76.3 kg (168 lb 3.2 oz)   SpO2 99%   BMI 29.80 kg/m   Estimated body mass index is 29.8 kg/m  as calculated from the following:    Height as of 6/30/20: 1.6 m (5' 3\").    Weight as of this encounter: 76.3 kg (168 lb 3.2 oz).  Medication Reconciliation: complete  Clark Sumner LPN  "

## 2021-05-14 ASSESSMENT — ANXIETY QUESTIONNAIRES: GAD7 TOTAL SCORE: 15

## 2021-06-07 NOTE — PROGRESS NOTES
"    Assessment & Plan     Anxiety state  Will change back to prozac at 30 mg daily   - FLUoxetine 20 MG tablet; Take 1.5 tablets (30 mg) by mouth daily    Strain of lumbar region, subsequent encounter  Resolved, now working out at [facility] and continuing with chiropractor         20 minutes spent on the date of the encounter doing chart review, patient visit and documentation        BMI:   Estimated body mass index is 29.76 kg/m  as calculated from the following:    Height as of this encounter: 1.6 m (5' 3\").    Weight as of this encounter: 76.2 kg (168 lb).           Return if symptoms worsen or fail to improve. which she prefers rather than a scheduled follow up    Elis Webster MD  [facility] - CHARLIE Kyle is a 45 year old who presents for the following health issues     HPI     Anxiety Follow-Up    How are you doing with your anxiety since your last visit? A little better    Are you having other symptoms that might be associated with anxiety? No    Have you had a significant life event? No     Are you feeling depressed? No    Do you have any concerns with your use of alcohol or other drugs? No    Social History     Tobacco Use     Smoking status: Never Smoker     Smokeless tobacco: Never Used   Substance Use Topics     Alcohol use: Yes     Comment: Rarely      Drug use: No     JASON-7 SCORE 5/24/2018 6/30/2020 5/13/2021   Total Score 3 0 15     PHQ 5/24/2018 6/30/2020 5/13/2021   PHQ-9 Total Score 3 0 6   Q9: Thoughts of better off dead/self-harm past 2 weeks Not at all Not at all Not at all     Last PHQ-9 5/13/2021   1.  Little interest or pleasure in doing things 1   2.  Feeling down, depressed, or hopeless 1   3.  Trouble falling or staying asleep, or sleeping too much 1   4.  Feeling tired or having little energy 2   5.  Poor appetite or overeating 1   6.  Feeling bad about yourself 0   7.  Trouble concentrating 0   8.  Moving slowly or restless 0   Q9: Thoughts of better off " "dead/self-harm past 2 weeks 0   PHQ-9 Total Score 6   Difficulty at work, home, or with people Somewhat difficult     JASON-7  5/13/2021   1. Feeling nervous, anxious, or on edge 3   2. Not being able to stop or control worrying 3   3. Worrying too much about different things 3   4. Trouble relaxing 3   5. Being so restless that it is hard to sit still 1   6. Becoming easily annoyed or irritable 1   7. Feeling afraid, as if something awful might happen 1   JASON-7 Total Score 15   If you checked any problems, how difficult have they made it for you to do your work, take care of things at home, or get along with other people? Very difficult       She would like to change back to prozac at a little higher dose of 30 mg. The lexapro makes her more tired    Lumbar strain has resolved       Review of Systems   Constitutional, HEENT, cardiovascular, pulmonary, gi and gu systems are negative, except as otherwise noted.      Objective    /70   Pulse 76   Temp 99.1  F (37.3  C) (Tympanic)   Resp 19   Ht 1.6 m (5' 3\")   Wt 76.2 kg (168 lb)   SpO2 97%   BMI 29.76 kg/m    Body mass index is 29.76 kg/m .  Physical Exam   GENERAL: healthy, alert and no distress  MS: no gross musculoskeletal defects noted, no edema  NEURO: Normal strength and tone, mentation intact and speech normal  PSYCH: mentation appears normal, affect normal/bright                "

## 2021-06-14 ENCOUNTER — OFFICE VISIT (OUTPATIENT)
Dept: FAMILY MEDICINE | Facility: OTHER | Age: 46
End: 2021-06-14
Attending: FAMILY MEDICINE
Payer: COMMERCIAL

## 2021-06-14 VITALS
OXYGEN SATURATION: 97 % | DIASTOLIC BLOOD PRESSURE: 70 MMHG | SYSTOLIC BLOOD PRESSURE: 108 MMHG | WEIGHT: 168 LBS | TEMPERATURE: 99.1 F | RESPIRATION RATE: 19 BRPM | HEART RATE: 76 BPM | HEIGHT: 63 IN | BODY MASS INDEX: 29.77 KG/M2

## 2021-06-14 DIAGNOSIS — S39.012D STRAIN OF LUMBAR REGION, SUBSEQUENT ENCOUNTER: ICD-10-CM

## 2021-06-14 DIAGNOSIS — F41.1 ANXIETY STATE: Primary | ICD-10-CM

## 2021-06-14 PROCEDURE — 99213 OFFICE O/P EST LOW 20 MIN: CPT | Performed by: FAMILY MEDICINE

## 2021-06-14 RX ORDER — FLUOXETINE 20 MG/1
30 TABLET, FILM COATED ORAL DAILY
Qty: 45 TABLET | Refills: 3 | Status: SHIPPED | OUTPATIENT
Start: 2021-06-14 | End: 2021-09-22

## 2021-06-14 ASSESSMENT — PAIN SCALES - GENERAL: PAINLEVEL: NO PAIN (0)

## 2021-06-14 ASSESSMENT — MIFFLIN-ST. JEOR: SCORE: 1386.17

## 2021-06-14 NOTE — NURSING NOTE
"Chief Complaint   Patient presents with     Anxiety       Initial /70   Pulse 76   Temp 99.1  F (37.3  C) (Tympanic)   Resp 19   Ht 1.6 m (5' 3\")   Wt 76.2 kg (168 lb)   SpO2 97%   BMI 29.76 kg/m   Estimated body mass index is 29.76 kg/m  as calculated from the following:    Height as of this encounter: 1.6 m (5' 3\").    Weight as of this encounter: 76.2 kg (168 lb).  Medication Reconciliation: complete  Cindy Capellan LPN    "

## 2021-07-02 ENCOUNTER — HOSPITAL ENCOUNTER (EMERGENCY)
Facility: HOSPITAL | Age: 46
Discharge: HOME | End: 2021-07-02
Attending: NURSE PRACTITIONER | Admitting: NURSE PRACTITIONER
Payer: COMMERCIAL

## 2021-07-02 VITALS
SYSTOLIC BLOOD PRESSURE: 115 MMHG | RESPIRATION RATE: 16 BRPM | OXYGEN SATURATION: 99 % | HEART RATE: 73 BPM | TEMPERATURE: 98.3 F | DIASTOLIC BLOOD PRESSURE: 79 MMHG

## 2021-07-02 DIAGNOSIS — H66.92 LEFT OTITIS MEDIA, UNSPECIFIED OTITIS MEDIA TYPE: ICD-10-CM

## 2021-07-02 DIAGNOSIS — H66.92 LEFT OTITIS MEDIA: Primary | ICD-10-CM

## 2021-07-02 PROCEDURE — 99213 OFFICE O/P EST LOW 20 MIN: CPT | Performed by: NURSE PRACTITIONER

## 2021-07-02 PROCEDURE — G0463 HOSPITAL OUTPT CLINIC VISIT: HCPCS

## 2021-07-02 RX ORDER — AMOXICILLIN 875 MG
875 TABLET ORAL 2 TIMES DAILY
Qty: 10 TABLET | Refills: 0 | Status: SHIPPED | OUTPATIENT
Start: 2021-07-02 | End: 2021-07-07

## 2021-07-02 RX ORDER — FLUTICASONE PROPIONATE 50 MCG
1 SPRAY, SUSPENSION (ML) NASAL DAILY
Qty: 9.9 ML | Refills: 0 | Status: SHIPPED | OUTPATIENT
Start: 2021-07-02 | End: 2021-07-09

## 2021-07-02 ASSESSMENT — ENCOUNTER SYMPTOMS
MYALGIAS: 0
SHORTNESS OF BREATH: 0
NAUSEA: 0
EYE ITCHING: 0
EYE DISCHARGE: 1
SORE THROAT: 1
TROUBLE SWALLOWING: 0
FEVER: 0
PSYCHIATRIC NEGATIVE: 1
HEADACHES: 1
FATIGUE: 1
SINUS PAIN: 1
DIARRHEA: 0
VOMITING: 0
EYE REDNESS: 0
CHILLS: 0
EYE PAIN: 0
SINUS PRESSURE: 1
RHINORRHEA: 1
COUGH: 1
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
## 2021-07-02 NOTE — ED TRIAGE NOTES
Pt presents left ear pain and sinus pain/pressure. Reports that sinus draining, drainage greenish yellow. Feels like there is fluid in ear. Sx for both ear and sinus started three days ago. Pt has taken ibuprofen, and it only helped for a short while.

## 2021-07-02 NOTE — DISCHARGE INSTRUCTIONS
Amoxicillin as ordered  - Take entire course of antibiotic even if you start to feel better.  - Antibiotics can cause stomach upset including nausea and diarrhea. Read your bottle or ask the pharmacist if antibiotic can be taken with food to help prevent nausea. If you have symptoms of diarrhea you can take an over-the-counter probiotic and/or increase foods with probiotics such as yogurt, [place], sauerkraut.    Flonase as needed for seasonal allergies    Follow up with primary care provider or return to urgent care/ED with any worsening in condition or additional concerns.

## 2021-08-08 ENCOUNTER — HEALTH MAINTENANCE LETTER (OUTPATIENT)
Age: 46
End: 2021-08-08

## 2021-08-17 DIAGNOSIS — F41.1 ANXIETY STATE: ICD-10-CM

## 2021-08-18 RX ORDER — FLUOXETINE 10 MG/1
CAPSULE ORAL
Qty: 30 CAPSULE | Refills: 0 | OUTPATIENT
Start: 2021-08-18

## 2021-09-21 DIAGNOSIS — F41.1 ANXIETY STATE: ICD-10-CM

## 2021-09-22 ENCOUNTER — ANCILLARY PROCEDURE (OUTPATIENT)
Dept: MAMMOGRAPHY | Facility: OTHER | Age: 46
End: 2021-09-22
Attending: FAMILY MEDICINE
Payer: COMMERCIAL

## 2021-09-22 ENCOUNTER — TELEPHONE (OUTPATIENT)
Dept: MAMMOGRAPHY | Facility: OTHER | Age: 46
End: 2021-09-22

## 2021-09-22 DIAGNOSIS — Z12.31 VISIT FOR SCREENING MAMMOGRAM: ICD-10-CM

## 2021-09-22 PROCEDURE — 77063 BREAST TOMOSYNTHESIS BI: CPT | Mod: TC | Performed by: STUDENT IN AN ORGANIZED HEALTH CARE EDUCATION/TRAINING PROGRAM

## 2021-09-22 PROCEDURE — 77067 SCR MAMMO BI INCL CAD: CPT | Mod: TC | Performed by: STUDENT IN AN ORGANIZED HEALTH CARE EDUCATION/TRAINING PROGRAM

## 2021-09-22 RX ORDER — FLUOXETINE 20 MG/1
20 TABLET, FILM COATED ORAL DAILY
Qty: 30 TABLET | Refills: 3 | Status: SHIPPED | OUTPATIENT
Start: 2021-09-22 | End: 2021-11-18 | Stop reason: ALTCHOICE

## 2021-09-22 RX ORDER — FLUOXETINE 10 MG/1
CAPSULE ORAL
Qty: 30 CAPSULE | Refills: 3 | Status: SHIPPED | OUTPATIENT
Start: 2021-09-22 | End: 2021-11-18 | Stop reason: ALTCHOICE

## 2021-10-03 ENCOUNTER — HEALTH MAINTENANCE LETTER (OUTPATIENT)
Age: 46
End: 2021-10-03

## 2021-11-15 DIAGNOSIS — J30.89 PERENNIAL ALLERGIC RHINITIS: ICD-10-CM

## 2021-11-17 RX ORDER — CETIRIZINE HYDROCHLORIDE 10 MG/1
TABLET ORAL
Qty: 90 TABLET | Refills: 0 | Status: SHIPPED | OUTPATIENT
Start: 2021-11-17 | End: 2022-04-05

## 2021-11-18 ENCOUNTER — TELEPHONE (OUTPATIENT)
Dept: FAMILY MEDICINE | Facility: OTHER | Age: 46
End: 2021-11-18
Payer: COMMERCIAL

## 2021-11-18 DIAGNOSIS — F41.1 ANXIETY STATE: Primary | ICD-10-CM

## 2021-11-18 RX ORDER — FLUOXETINE 40 MG/1
40 CAPSULE ORAL DAILY
Qty: 30 CAPSULE | Refills: 1 | Status: SHIPPED | OUTPATIENT
Start: 2021-11-18 | End: 2022-04-05

## 2021-11-18 NOTE — TELEPHONE ENCOUNTER
Left message to return call, advised new dose sent in. Advised to call us back to schedule telephone visit.

## 2021-11-18 NOTE — TELEPHONE ENCOUNTER
Fluoxetine 40 mg daily was sent in for her. We should do a follow up telephone visit in about 3-4 weeks

## 2021-11-18 NOTE — TELEPHONE ENCOUNTER
"9:22 AM    Reason for Call: Orders - Fluoxetine    Description:     Patient requesting to increase fluoxetine to 40 mg.     Patient states, \"I just feel like I need it right now\"    Order for 40 mg pended for review.     Pharmacy attached      Was an appointment offered for this call? No  If yes : Appointment type              Date    Preferred method for responding to this message: Telephone Call  What is your phone number- [phone]    If we cannot reach you directly, may we leave a detailed response at the number you provided? Yes    Can this message wait until your PCP/provider returns, if available today?   Not applicable    Daily Ohara RN      "

## 2022-03-08 ENCOUNTER — E-VISIT (OUTPATIENT)
Dept: URGENT CARE | Facility: CLINIC | Age: 47
End: 2022-03-08
Payer: COMMERCIAL

## 2022-03-08 DIAGNOSIS — Z20.822 SUSPECTED COVID-19 VIRUS INFECTION: ICD-10-CM

## 2022-03-08 DIAGNOSIS — J01.90 ACUTE SINUSITIS, RECURRENCE NOT SPECIFIED, UNSPECIFIED LOCATION: Primary | ICD-10-CM

## 2022-03-08 DIAGNOSIS — J06.9 VIRAL URI: ICD-10-CM

## 2022-03-08 PROCEDURE — 99421 OL DIG E/M SVC 5-10 MIN: CPT | Performed by: EMERGENCY MEDICINE

## 2022-03-08 RX ORDER — GUAIFENESIN 1200 MG/1
1200 TABLET, EXTENDED RELEASE ORAL 2 TIMES DAILY
Qty: 60 TABLET | Refills: 0 | Status: SHIPPED | OUTPATIENT
Start: 2022-03-08 | End: 2022-04-05

## 2022-03-08 RX ORDER — FLUTICASONE PROPIONATE 50 MCG
1 SPRAY, SUSPENSION (ML) NASAL DAILY
Qty: 9.9 ML | Refills: 0 | Status: SHIPPED | OUTPATIENT
Start: 2022-03-08 | End: 2022-04-05

## 2022-03-08 NOTE — PATIENT INSTRUCTIONS
Viral Upper Respiratory Illness (Adult)    You have a viral upper respiratory illness (URI), which is another term for the common cold. This illness is contagious during the first few days. It is spread through the air by coughing and sneezing. It may also be spread by direct contact (touching the sick person and then touching your own eyes, nose, or mouth). Frequent handwashing will decrease risk of spread. Most viral illnesses go away within 7 to 10 days with rest and simple home remedies. Sometimes the illness may last for several weeks. Antibiotics will not kill a virus, and they are generally not prescribed for this condition.  Home care    If symptoms are severe, rest at home for the first 2 to 3 days. When you resume activity, don't let yourself get too tired.    Don't smoke. If you need help stopping, talk with your healthcare provider.    Avoid being exposed to cigarette smoke (yours or others ).    You may use acetaminophen or ibuprofen to control pain and fever, unless another medicine was prescribed. If you have chronic liver or kidney disease, have ever had a stomach ulcer or gastrointestinal bleeding, or are taking blood-thinning medicines, talk with your healthcare provider before using these medicines. Aspirin should never be given to anyone under 18 years of age who is ill with a viral infection or fever. It may cause severe liver or brain damage.    Your appetite may be poor, so a light diet is fine. Stay well hydrated by drinking 6 to 8 glasses of fluids per day (water, soft drinks, juices, tea, or soup). Extra fluids will help loosen secretions in the nose and lungs.    Over-the-counter cold medicines will not shorten the length of time you re sick, but they may be helpful for the following symptoms: cough, sore throat, and nasal and sinus congestion. If you take prescription medicines, ask your healthcare provider or pharmacist which over-the-counter medicines are safe to use. (Note: Don't  use decongestants if you have high blood pressure.)  Follow-up care  Follow up with your healthcare provider, or as advised.  When to seek medical advice  Call your healthcare provider right away if any of these occur:    Cough with lots of colored sputum (mucus)    Severe headache; face, neck, or ear pain    Difficulty swallowing due to throat pain    Fever of 100.4 F (38 C) or higher, or as directed by your healthcare provider  Call 911  Call 911 if any of these occur:    Chest pain, shortness of breath, wheezing, or difficulty breathing    Coughing up blood    Very severe pain with swallowing, especially if it goes along with a muffled voice   [employer] last reviewed this educational content on 6/1/2018 2000-2021 [employer]. All rights reserved. This information is not intended as a substitute for professional medical care. Always follow your healthcare professional's instructions.        Dear Anabella Albrecht    After reviewing your responses, I suspect you may have sinusitis from a viral upper respiratory infection; possibly COVID-19 or Influenza A/B.     Based on your responses and diagnosis, I have prescribed Mucinex and Flonase to treat your symptoms. I have sent this to your pharmacy.?     I have also ordered tests for Influenza A/B and COVID-19 to be performed at an [facility].    It is also important to stay well hydrated, get lots of rest and take over-the-counter decongestants,?tylenol?or ibuprofen if you?are able to?take those medications per your primary care provider to help relieve discomfort.?     It is important that you take?all of?your prescribed medication even if your symptoms are improving after a few doses.? Taking?all of?your medicine helps prevent the symptoms from returning.?     If your symptoms worsen, you develop severe headache, vomiting, high fever (>102), or are not improving in 7 days, please contact your primary care provider for an appointment or  visit any of our convenient Walk-in Care or Urgent Care Centers to be seen which can be found on our website?here.?     Thanks again for choosing?us?as your health care partner,?   ?  Lawrence Yang PA-C?

## 2022-04-05 ENCOUNTER — OFFICE VISIT (OUTPATIENT)
Dept: FAMILY MEDICINE | Facility: OTHER | Age: 47
End: 2022-04-05
Payer: COMMERCIAL

## 2022-04-05 ENCOUNTER — NURSE TRIAGE (OUTPATIENT)
Dept: FAMILY MEDICINE | Facility: OTHER | Age: 47
End: 2022-04-05
Payer: COMMERCIAL

## 2022-04-05 VITALS
TEMPERATURE: 98.1 F | HEART RATE: 77 BPM | BODY MASS INDEX: 29.41 KG/M2 | DIASTOLIC BLOOD PRESSURE: 64 MMHG | SYSTOLIC BLOOD PRESSURE: 120 MMHG | OXYGEN SATURATION: 98 % | WEIGHT: 166 LBS | HEIGHT: 63 IN

## 2022-04-05 DIAGNOSIS — J01.00 ACUTE NON-RECURRENT MAXILLARY SINUSITIS: Primary | ICD-10-CM

## 2022-04-05 DIAGNOSIS — J30.2 SEASONAL ALLERGIC RHINITIS, UNSPECIFIED TRIGGER: ICD-10-CM

## 2022-04-05 PROCEDURE — 99213 OFFICE O/P EST LOW 20 MIN: CPT

## 2022-04-05 RX ORDER — CETIRIZINE HYDROCHLORIDE 10 MG/1
10 TABLET ORAL DAILY
COMMUNITY
End: 2022-04-05

## 2022-04-05 RX ORDER — CETIRIZINE HYDROCHLORIDE 10 MG/1
10 TABLET ORAL DAILY
Qty: 90 TABLET | Refills: 3 | Status: SHIPPED | OUTPATIENT
Start: 2022-04-05 | End: 2024-09-25

## 2022-04-05 ASSESSMENT — PAIN SCALES - GENERAL: PAINLEVEL: WORST PAIN (10)

## 2022-04-05 NOTE — TELEPHONE ENCOUNTER
"Establishing care with Dr. Barraza on 4/20/22. Pt requesting to be seen for a sinus infection pt reports puffiness around eye and nose. Pt reports HA, nasal congestion and drainage. Sx started over one month ago. Pt has tried mucinex, nasal rinsing, and Flonase no relief.   Dr. Barraza approved appt with Lin.     Additional Information    Negative: Difficulty breathing, and not from stuffy nose (e.g., not relieved by cleaning out the nose)    Negative: Sounds like a life-threatening emergency to the triager    Negative: SEVERE sinus pain    Negative: Severe headache    Negative: Redness or swelling on the cheek, forehead, or around the eye    Negative: Fever > 103 F (39.4 C)    Negative: Fever > 101 F (38.3 C) and over 60 years of age    Negative: Fever > 100.0 F (37.8 C) and has diabetes mellitus or a weak immune system (e.g., HIV positive, cancer chemotherapy, organ transplant, splenectomy, chronic steroids)    Negative: Fever > 100.0 F (37.8 C) and bedridden (e.g., nursing home patient, stroke, chronic illness, recovering from surgery)    Fever present > 3 days (72 hours)    Answer Assessment - Initial Assessment Questions  1. LOCATION: \"Where does it hurt?\"       See note  2. ONSET: \"When did the sinus pain start?\"  (e.g., hours, days)         3. SEVERITY: \"How bad is the pain?\"   (Scale 1-10; mild, moderate or severe)    - MILD (1-3): doesn't interfere with normal activities     - MODERATE (4-7): interferes with normal activities (e.g., work or school) or awakens from sleep    - SEVERE (8-10): excruciating pain and patient unable to do any normal activities         moderate  4. RECURRENT SYMPTOM: \"Have you ever had sinus problems before?\" If so, ask: \"When was the last time?\" and \"What happened that time?\"       Yes hx of sinus infection  5. NASAL CONGESTION: \"Is the nose blocked?\" If so, ask, \"Can you open it or must you breathe through the mouth?\"        6. NASAL DISCHARGE: \"Do you have discharge from " "your nose?\" If so ask, \"What color?\"        7. FEVER: \"Do you have a fever?\" If so, ask: \"What is it, how was it measured, and when did it start?\"       T   8. OTHER SYMPTOMS: \"Do you have any other symptoms?\" (e.g., sore throat, cough, earache, difficulty breathing)      Earache cough  9. PREGNANCY: \"Is there any chance you are pregnant?\" \"When was your last menstrual period?\"      no    Protocols used: SINUS PAIN AND CONGESTION-A-[place]      "

## 2022-04-05 NOTE — PROGRESS NOTES
Assessment & Plan     Acute non-recurrent maxillary sinusitis  46-year-old female presenting with concerns of sinus pressure and headaches. Onset of URI symptoms 1 month ago, initially improved. With increased facial pain/sinus pressure R>L over the last 1-2 weeks. Will treat with Augmentin. Discussed to call if not improving or with new concerns. Patient verbalized understanding.   - amoxicillin-clavulanate (AUGMENTIN) 875-125 MG tablet  Dispense: 14 tablet; Refill: 0    Seasonal allergic rhinitis, unspecified trigger  - cetirizine (ZYRTEC) 10 MG tablet  Dispense: 90 tablet; Refill: 3      Return if symptoms worsen or fail to improve.    NAMRATA [facility] - CHARLIE Duarte is a 46 year old who presents for the following health issues    HPI     Acute Illness  Acute illness concerns: coughing,headache, and sinus pressure   Onset/Duration: coughing and headache, onset 1 month ago, sinus pressure for last 1-2 weeks  Symptoms:  Fever: YES- no fever today, but has had a few in the past month, up to 100  Chills/Sweats: YES- at night   Headache (location?): YES- across her forehead  Sinus Pressure: YES- has been ongoing for the past 2 week  Conjunctivitis:  no  Ear Pain: YES: bilateral  Rhinorrhea: YES  Congestion: YES  Sore Throat: no  Cough: YES-non-productive  Wheeze: no  Decreased Appetite: YES- less not eating a whole lot  Nausea: no  Vomiting: no  Diarrhea: no  Dysuria/Freq.: no  Dysuria or Hematuria: no  Fatigue/Achiness: YES  Sick/Strep Exposure: no  Therapies tried and outcome: musinex, zyrtec daily    Noticed swelling/puffiness below eyes today. Reports increased facial pain for the past 2 weeks, R>L, worsened by bending over. Reporting thick, green nasal drainage. No fevers today. States temps of  in the past week.       Review of Systems   Constitutional, HEENT, cardiovascular, pulmonary, GI, , musculoskeletal, neuro, skin, endocrine and psych systems are  "negative, except as otherwise noted.      Objective    /64 (BP Location: Left arm, Patient Position: Sitting, Cuff Size: Adult Regular)   Pulse 77   Temp 98.1  F (36.7  C) (Tympanic)   Ht 1.6 m (5' 3\")   Wt 75.3 kg (166 lb)   SpO2 98%   BMI 29.41 kg/m    Body mass index is 29.41 kg/m .     Physical Exam  Constitutional:       General: She is not in acute distress.     Appearance: She is not ill-appearing.   HENT:      Nose:      Right Turbinates: Not swollen.      Left Turbinates: Not swollen.      Right Sinus: Maxillary sinus tenderness present.      Left Sinus: Maxillary sinus tenderness present.      Comments: R>L maxillary sinus tenderness.     Mouth/Throat:      Mouth: Mucous membranes are moist.      Pharynx: No pharyngeal swelling, oropharyngeal exudate or posterior oropharyngeal erythema.   Cardiovascular:      Rate and Rhythm: Normal rate and regular rhythm.      Pulses: Normal pulses.   Pulmonary:      Effort: Pulmonary effort is normal.      Breath sounds: Normal breath sounds.   Skin:     General: Skin is warm and dry.      Capillary Refill: Capillary refill takes less than 2 seconds.   Neurological:      Mental Status: She is alert.                    "

## 2022-04-22 ENCOUNTER — OFFICE VISIT (OUTPATIENT)
Dept: FAMILY MEDICINE | Facility: OTHER | Age: 47
End: 2022-04-22
Attending: FAMILY MEDICINE
Payer: COMMERCIAL

## 2022-04-22 VITALS
HEIGHT: 66 IN | OXYGEN SATURATION: 99 % | BODY MASS INDEX: 28.12 KG/M2 | SYSTOLIC BLOOD PRESSURE: 110 MMHG | DIASTOLIC BLOOD PRESSURE: 68 MMHG | WEIGHT: 175 LBS | TEMPERATURE: 98.8 F | HEART RATE: 82 BPM

## 2022-04-22 DIAGNOSIS — R60.1 GENERALIZED EDEMA: ICD-10-CM

## 2022-04-22 DIAGNOSIS — F41.1 GAD (GENERALIZED ANXIETY DISORDER): Primary | ICD-10-CM

## 2022-04-22 LAB
ALBUMIN SERPL-MCNC: 4 G/DL (ref 3.4–5)
ALP SERPL-CCNC: 51 U/L (ref 40–150)
ALT SERPL W P-5'-P-CCNC: 23 U/L (ref 0–50)
ANION GAP SERPL CALCULATED.3IONS-SCNC: 6 MMOL/L (ref 3–14)
AST SERPL W P-5'-P-CCNC: 16 U/L (ref 0–45)
BASOPHILS # BLD AUTO: 0.1 10E3/UL (ref 0–0.2)
BASOPHILS NFR BLD AUTO: 1 %
BILIRUB SERPL-MCNC: 0.2 MG/DL (ref 0.2–1.3)
BUN SERPL-MCNC: 23 MG/DL (ref 7–30)
CALCIUM SERPL-MCNC: 9.3 MG/DL (ref 8.5–10.1)
CHLORIDE BLD-SCNC: 105 MMOL/L (ref 94–109)
CO2 SERPL-SCNC: 27 MMOL/L (ref 20–32)
CREAT SERPL-MCNC: 0.99 MG/DL (ref 0.52–1.04)
EOSINOPHIL # BLD AUTO: 0.1 10E3/UL (ref 0–0.7)
EOSINOPHIL NFR BLD AUTO: 2 %
ERYTHROCYTE [DISTWIDTH] IN BLOOD BY AUTOMATED COUNT: 13 % (ref 10–15)
GFR SERPL CREATININE-BSD FRML MDRD: 72 ML/MIN/1.73M2
GLUCOSE BLD-MCNC: 94 MG/DL (ref 70–99)
HCT VFR BLD AUTO: 38.7 % (ref 35–47)
HGB BLD-MCNC: 13 G/DL (ref 11.7–15.7)
IMM GRANULOCYTES # BLD: 0 10E3/UL
IMM GRANULOCYTES NFR BLD: 1 %
LYMPHOCYTES # BLD AUTO: 2.8 10E3/UL (ref 0.8–5.3)
LYMPHOCYTES NFR BLD AUTO: 32 %
MCH RBC QN AUTO: 29.7 PG (ref 26.5–33)
MCHC RBC AUTO-ENTMCNC: 33.6 G/DL (ref 31.5–36.5)
MCV RBC AUTO: 89 FL (ref 78–100)
MONOCYTES # BLD AUTO: 0.5 10E3/UL (ref 0–1.3)
MONOCYTES NFR BLD AUTO: 5 %
NEUTROPHILS # BLD AUTO: 5.3 10E3/UL (ref 1.6–8.3)
NEUTROPHILS NFR BLD AUTO: 59 %
NRBC # BLD AUTO: 0 10E3/UL
NRBC BLD AUTO-RTO: 0 /100
PLATELET # BLD AUTO: 252 10E3/UL (ref 150–450)
POTASSIUM BLD-SCNC: 3.6 MMOL/L (ref 3.4–5.3)
PROT SERPL-MCNC: 7.1 G/DL (ref 6.8–8.8)
RBC # BLD AUTO: 4.37 10E6/UL (ref 3.8–5.2)
SODIUM SERPL-SCNC: 138 MMOL/L (ref 133–144)
TSH SERPL DL<=0.005 MIU/L-ACNC: 1.91 MU/L (ref 0.4–4)
WBC # BLD AUTO: 8.8 10E3/UL (ref 4–11)

## 2022-04-22 PROCEDURE — 99213 OFFICE O/P EST LOW 20 MIN: CPT | Performed by: FAMILY MEDICINE

## 2022-04-22 PROCEDURE — 36415 COLL VENOUS BLD VENIPUNCTURE: CPT | Performed by: FAMILY MEDICINE

## 2022-04-22 PROCEDURE — 83001 ASSAY OF GONADOTROPIN (FSH): CPT | Performed by: FAMILY MEDICINE

## 2022-04-22 PROCEDURE — 80050 GENERAL HEALTH PANEL: CPT | Performed by: FAMILY MEDICINE

## 2022-04-22 RX ORDER — FLUOXETINE 10 MG/1
10 CAPSULE ORAL DAILY
Qty: 90 CAPSULE | Refills: 0 | Status: SHIPPED | OUTPATIENT
Start: 2022-04-22 | End: 2022-06-07

## 2022-04-22 ASSESSMENT — PAIN SCALES - GENERAL: PAINLEVEL: NO PAIN (0)

## 2022-04-22 NOTE — PROGRESS NOTES
Assessment & Plan     JASON (generalized anxiety disorder)  Reviewed selective serotonin reuptake inhibitor and SNRI medications, common side effects. Discussed options with regard to treatments, further work up. Will get labs as below including gene sight. In the mean time. Pt did have some improvement with prozac, will restart at low dose. Recheck scheduled.   - GeneSight (You MUST fill out the order form (see link in reference section below) and fax the completed form to [employer] in order for the test to be completed.)  - FLUoxetine (PROZAC) 10 MG capsule  Dispense: 90 capsule; Refill: 0    Generalized edema  Diffuse, non pitting associated with weight gain and possibly with menses. Will get basic labs as below and continue to follow weight.   - TSH with free T4 reflex  - CBC with platelets and differential  - Comprehensive metabolic panel (BMP + Alb, Alk Phos, ALT, AST, Total. Bili, TP)  - Follicle stimulating hormone    30 minutes spent on the date of the encounter doing chart review, review of test results, interpretation of tests, patient visit and documentation     No follow-ups on file.    Karen Barraza MD  [facility] - CHARLIE Duarte is a 46 year old who presents for the following health issues:     HPI     Anxiety Follow-Up    How are you doing with your anxiety since your last visit? Worsened Started on Lexapro, but feels like she was better on a lower dose of fluoxetine. Not currently taking any medications.     Are you having other symptoms that might be associated with anxiety? Yes:  Excessive worrying, feels overwhelmed by children. No panick attacks but does sometimes have racing heart rate. Feels like she is sleeping better     Have you had a significant life event? No     Are you feeling depressed? No    Do you have any concerns with your use of alcohol or other drugs? No     Previously on prozac, helpful but noted difficulty sleeping with doses over 20 and  attributed it to the med.     Lexapro in the past, took only shortly due to the fact that it made her feel bad right away.     Has been off of all medications for about 6 months. No FH of anxiety or depression that has been treated.     Social History     Tobacco Use     Smoking status: Never Smoker     Smokeless tobacco: Never Used   Substance Use Topics     Alcohol use: Yes     Comment: Rarely      Drug use: No     JASON-7 SCORE 5/24/2018 6/30/2020 5/13/2021   Total Score 3 0 15     PHQ 5/24/2018 6/30/2020 5/13/2021   PHQ-9 Total Score 3 0 6   Q9: Thoughts of better off dead/self-harm past 2 weeks Not at all Not at all Not at all       How many servings of fruits and vegetables do you eat daily?  2-3    On average, how many sweetened beverages do you drink each day (Examples: soda, juice, sweet tea, etc.  Do NOT count diet or artificially sweetened beverages)?   0    How many days per week do you exercise enough to make your heart beat faster? 3 or less    How many minutes a day do you exercise enough to make your heart beat faster? 20 - 29    How many days per week do you miss taking your medication? 0    Concern - Swelling  Onset: 6 months  Description: Hands are swelling, hard to put rings on, feels like face is swollen.   Intensity: severe  Progression of Symptoms:  worsening and intermittent  Accompanying Signs & Symptoms: Feels like it is around her cycle.  Previous history of similar problem: no   Precipitating factors:        Worsened by: none  Alleviating factors:        Improved by: none  Therapies tried and outcome: None    Increase in weight, swelling is diffuse and waxes/wanes. No palpitations, shortness of breath. Pt has issues with constipation and diarrhea, rarely has regular stooling pattern.     Pt is s/p endometrial ablation 3 years ago. Starting now to have some spotting. Notes some cyclical nature to the swelling.     Review of Systems   Constitutional, HEENT, cardiovascular, pulmonary, gi and  "gu systems are negative, except as otherwise noted.      Objective    /68 (BP Location: Left arm, Patient Position: Sitting, Cuff Size: Adult Regular)   Pulse 82   Temp 98.8  F (37.1  C) (Tympanic)   Ht 1.676 m (5' 6\")   Wt 79.4 kg (175 lb)   SpO2 99%   BMI 28.25 kg/m    Body mass index is 28.25 kg/m .  Physical Exam   GENERAL: alert and no distress  NECK: no adenopathy, no asymmetry, masses, or scars and thyroid normal to palpation  RESP: lungs clear to auscultation - no rales, rhonchi or wheezes  CV: regular rates and rhythm, normal S1 S2, no S3 or S4, no murmur, click or rub and no peripheral edema  ABDOMEN: soft, nontender, no hepatosplenomegaly, no masses and bowel sounds normal  MS: no gross musculoskeletal defects noted, no edema  SKIN: no suspicious lesions or rashes  NEURO: mentation intact and speech normal  PSYCH: mentation appears normal, affect normal/bright    No results found for any visits on 04/22/22.      "

## 2022-04-22 NOTE — NURSING NOTE
"Chief Complaint   Patient presents with     Establish Care     Anxiety            Swelling       Initial /68 (BP Location: Left arm, Patient Position: Sitting, Cuff Size: Adult Regular)   Pulse 82   Temp 98.8  F (37.1  C) (Tympanic)   Ht 1.676 m (5' 6\")   Wt 79.4 kg (175 lb)   SpO2 99%   BMI 28.25 kg/m   Estimated body mass index is 28.25 kg/m  as calculated from the following:    Height as of this encounter: 1.676 m (5' 6\").    Weight as of this encounter: 79.4 kg (175 lb).  Medication Reconciliation: complete  Chelsea Harp LPN  "

## 2022-04-23 LAB — FSH SERPL-ACNC: 7.9 IU/L

## 2022-06-07 ENCOUNTER — VIRTUAL VISIT (OUTPATIENT)
Dept: FAMILY MEDICINE | Facility: OTHER | Age: 47
End: 2022-06-07
Attending: FAMILY MEDICINE
Payer: COMMERCIAL

## 2022-06-07 DIAGNOSIS — F41.1 GAD (GENERALIZED ANXIETY DISORDER): ICD-10-CM

## 2022-06-07 DIAGNOSIS — R06.09 DOE (DYSPNEA ON EXERTION): Primary | ICD-10-CM

## 2022-06-07 PROCEDURE — 99213 OFFICE O/P EST LOW 20 MIN: CPT | Mod: 95 | Performed by: FAMILY MEDICINE

## 2022-06-07 ASSESSMENT — ANXIETY QUESTIONNAIRES
GAD7 TOTAL SCORE: 2
6. BECOMING EASILY ANNOYED OR IRRITABLE: NOT AT ALL
1. FEELING NERVOUS, ANXIOUS, OR ON EDGE: NOT AT ALL
IF YOU CHECKED OFF ANY PROBLEMS ON THIS QUESTIONNAIRE, HOW DIFFICULT HAVE THESE PROBLEMS MADE IT FOR YOU TO DO YOUR WORK, TAKE CARE OF THINGS AT HOME, OR GET ALONG WITH OTHER PEOPLE: SOMEWHAT DIFFICULT
3. WORRYING TOO MUCH ABOUT DIFFERENT THINGS: SEVERAL DAYS
GAD7 TOTAL SCORE: 2
2. NOT BEING ABLE TO STOP OR CONTROL WORRYING: SEVERAL DAYS
7. FEELING AFRAID AS IF SOMETHING AWFUL MIGHT HAPPEN: NOT AT ALL
4. TROUBLE RELAXING: NOT AT ALL
5. BEING SO RESTLESS THAT IT IS HARD TO SIT STILL: NOT AT ALL

## 2022-06-07 ASSESSMENT — PAIN SCALES - GENERAL: PAINLEVEL: NO PAIN (0)

## 2022-06-07 ASSESSMENT — PATIENT HEALTH QUESTIONNAIRE - PHQ9: SUM OF ALL RESPONSES TO PHQ QUESTIONS 1-9: 0

## 2022-06-07 NOTE — PROGRESS NOTES
Felicia is a 46 year old who is being evaluated via a billable telephone visit.      What phone number would you like to be contacted at? [phone]  How would you like to obtain your AVS? MyChart    Assessment & Plan     JASON (generalized anxiety disorder)  Okay to increase to 20mg daily. Follow up in 2 mo  - FLUoxetine (PROZAC) 20 MG capsule  Dispense: 90 capsule; Refill: 3    POLK (dyspnea on exertion)  Fatigue is chronic, also noting decreased exercise tolerance which seems to be worsening. No chest pain, wheezing. Start with EKG and PFTs, pt to come in for these. See in clinic next visit for follow up and further work up.   - General PFT Lab (Please always keep checked)  - Pulmonary Function Test  - EKG 12-lead complete w/read - (Clinic Performed)  - General PFT Lab (Please always keep checked)    25 minutes spent on the date of the encounter doing chart review, review of test results, interpretation of tests, patient visit and documentation     Return in about 2 months (around 8/7/2022) for POLK, mood. .    Karen Barraza MD  [facility] [place]    Subjective   Felicia is a 46 year old who presents for the following health issues:     HPI     Anxiety Follow-Up    How are you doing with your anxiety since your last visit? Improved     Are you having other symptoms that might be associated with anxiety? No    Have you had a significant life event? No     Are you feeling depressed? No    Do you have any concerns with your use of alcohol or other drugs? No    Social History     Tobacco Use     Smoking status: Never Smoker     Smokeless tobacco: Never Used   Substance Use Topics     Alcohol use: Yes     Comment: Rarely      Drug use: No     JASON-7 SCORE 5/24/2018 6/30/2020 5/13/2021   Total Score 3 0 15     PHQ 5/24/2018 6/30/2020 5/13/2021   PHQ-9 Total Score 3 0 6   Q9: Thoughts of better off dead/self-harm past 2 weeks Not at all Not at all Not at all     JASON-7  6/7/2022   1. Feeling nervous, anxious,  or on edge 0   2. Not being able to stop or control worrying 1   3. Worrying too much about different things 1   4. Trouble relaxing 0   5. Being so restless that it is hard to sit still 0   6. Becoming easily annoyed or irritable 0   7. Feeling afraid, as if something awful might happen 0   JASON-7 Total Score 2   If you checked any problems, how difficult have they made it for you to do your work, take care of things at home, or get along with other people? Somewhat difficult     Concern - dyspnea  Onset: months ago   Description: shortness of breath/winded with exercise.   Intensity: moderate  Progression of Symptoms:  worsening  Accompanying Signs & Symptoms: no chest pain, dizziness, presyncope, palpitations  Previous history of similar problem: no  Precipitating factors:        Worsened by: activity  Alleviating factors:        Improved by: rest  Therapies tried and outcome:  none     Review of Systems   Constitutional, HEENT, cardiovascular, pulmonary, gi and gu systems are negative, except as otherwise noted.      Objective           Vitals:  No vitals were obtained today due to virtual visit.    Physical Exam   healthy, alert and no distress  PSYCH: Alert and oriented times 3; coherent speech, normal   rate and volume, able to articulate logical thoughts, able   to abstract reason, no tangential thoughts, no hallucinations   or delusions  Her affect is normal  RESP: No cough, no audible wheezing, able to talk in full sentences  Remainder of exam unable to be completed due to telephone visits    No results found for any visits on 06/07/22.    Phone call duration: 12 minutes

## 2022-06-20 ENCOUNTER — MYC MEDICAL ADVICE (OUTPATIENT)
Dept: FAMILY MEDICINE | Facility: OTHER | Age: 47
End: 2022-06-20
Payer: COMMERCIAL

## 2022-08-10 ENCOUNTER — E-VISIT (OUTPATIENT)
Dept: URGENT CARE | Facility: CLINIC | Age: 47
End: 2022-08-10
Payer: COMMERCIAL

## 2022-08-10 DIAGNOSIS — H57.12 EYE PAIN, LEFT: Primary | ICD-10-CM

## 2022-08-10 PROCEDURE — 99207 PR NON-BILLABLE SERV PER CHARTING: CPT | Performed by: PHYSICIAN ASSISTANT

## 2022-08-10 NOTE — PATIENT INSTRUCTIONS
Dear Anabella Albrecht,    We are sorry you are not feeling well. Based on the responses you provided, it is recommended that you be seen in-person in urgent care so we can better evaluate your symptoms. Please click here to find the nearest urgent care location to you.   You will not be charged for this Visit. Thank you for trusting us with your care.    Abby Sanabria PA-C

## 2022-09-04 ENCOUNTER — HEALTH MAINTENANCE LETTER (OUTPATIENT)
Age: 47
End: 2022-09-04

## 2022-09-20 ENCOUNTER — OFFICE VISIT (OUTPATIENT)
Dept: FAMILY MEDICINE | Facility: OTHER | Age: 47
End: 2022-09-20
Attending: FAMILY MEDICINE
Payer: COMMERCIAL

## 2022-09-20 VITALS
HEART RATE: 74 BPM | OXYGEN SATURATION: 97 % | WEIGHT: 170 LBS | TEMPERATURE: 98.2 F | HEIGHT: 66 IN | DIASTOLIC BLOOD PRESSURE: 68 MMHG | SYSTOLIC BLOOD PRESSURE: 104 MMHG | BODY MASS INDEX: 27.32 KG/M2 | RESPIRATION RATE: 16 BRPM

## 2022-09-20 DIAGNOSIS — Z12.11 SCREENING FOR COLON CANCER: ICD-10-CM

## 2022-09-20 DIAGNOSIS — F41.1 GAD (GENERALIZED ANXIETY DISORDER): ICD-10-CM

## 2022-09-20 DIAGNOSIS — N94.3 PRE-MENSTRUAL SYNDROME: Primary | ICD-10-CM

## 2022-09-20 PROCEDURE — 99213 OFFICE O/P EST LOW 20 MIN: CPT | Performed by: FAMILY MEDICINE

## 2022-09-20 ASSESSMENT — ANXIETY QUESTIONNAIRES
7. FEELING AFRAID AS IF SOMETHING AWFUL MIGHT HAPPEN: SEVERAL DAYS
2. NOT BEING ABLE TO STOP OR CONTROL WORRYING: SEVERAL DAYS
IF YOU CHECKED OFF ANY PROBLEMS ON THIS QUESTIONNAIRE, HOW DIFFICULT HAVE THESE PROBLEMS MADE IT FOR YOU TO DO YOUR WORK, TAKE CARE OF THINGS AT HOME, OR GET ALONG WITH OTHER PEOPLE: SOMEWHAT DIFFICULT
3. WORRYING TOO MUCH ABOUT DIFFERENT THINGS: SEVERAL DAYS
GAD7 TOTAL SCORE: 6
GAD7 TOTAL SCORE: 6
4. TROUBLE RELAXING: SEVERAL DAYS
1. FEELING NERVOUS, ANXIOUS, OR ON EDGE: SEVERAL DAYS
6. BECOMING EASILY ANNOYED OR IRRITABLE: SEVERAL DAYS
5. BEING SO RESTLESS THAT IT IS HARD TO SIT STILL: NOT AT ALL

## 2022-09-20 ASSESSMENT — PAIN SCALES - GENERAL: PAINLEVEL: NO PAIN (0)

## 2022-09-20 ASSESSMENT — PATIENT HEALTH QUESTIONNAIRE - PHQ9: SUM OF ALL RESPONSES TO PHQ QUESTIONS 1-9: 5

## 2022-09-20 NOTE — PROGRESS NOTES
Assessment & Plan     JASON (generalized anxiety disorder) / Pre-menstrual syndrome  Continue prozac for now. Pt does not significant mood swings with menstrual cycle. Has not tried OCP or similar to see if that mitigates mood worsening. Consider in the future. Genesight testing never completed from our last visit, referred instead for pgx testing through [facility], await those results and consider OCP if needed.   - FLUoxetine (PROZAC) 20 MG capsule  Dispense: 180 capsule; Refill: 3  - Adult Genetics & Metabolism Referral    Screening for colon cancer  - AAMIR([employer])    25 minutes spent on the date of the encounter doing chart review, review of test results, interpretation of tests, patient visit and documentation     Karen Barraza MD  [facility] - CHARLIE Duarte is a 47 year old, presenting for the following health issues:  Recheck Medication      HPI     Anxiety Follow-Up    How are you doing with your anxiety since your last visit? Improved     Are you having other symptoms that might be associated with anxiety? No    Have you had a significant life event? No     Are you feeling depressed? No    Do you have any concerns with your use of alcohol or other drugs? No    Social History     Tobacco Use     Smoking status: Never Smoker     Smokeless tobacco: Never Used   Substance Use Topics     Alcohol use: Yes     Comment: Rarely      Drug use: No     JASON-7 SCORE 5/13/2021 6/7/2022 9/20/2022   Total Score 15 2 6     PHQ 5/13/2021 6/7/2022 9/20/2022   PHQ-9 Total Score 6 0 5   Q9: Thoughts of better off dead/self-harm past 2 weeks Not at all Not at all Not at all     Last PHQ-9 9/20/2022   1.  Little interest or pleasure in doing things 1   2.  Feeling down, depressed, or hopeless 0   3.  Trouble falling or staying asleep, or sleeping too much 1   4.  Feeling tired or having little energy 1   5.  Poor appetite or overeating 1   6.  Feeling bad about yourself 0   7.  Trouble  "concentrating 1   8.  Moving slowly or restless 0   Q9: Thoughts of better off dead/self-harm past 2 weeks 0   PHQ-9 Total Score 5   Difficulty at work, home, or with people Not difficult at all     JASON-7  9/20/2022   1. Feeling nervous, anxious, or on edge 1   2. Not being able to stop or control worrying 1   3. Worrying too much about different things 1   4. Trouble relaxing 1   5. Being so restless that it is hard to sit still 0   6. Becoming easily annoyed or irritable 1   7. Feeling afraid, as if something awful might happen 1   JASON-7 Total Score 6   If you checked any problems, how difficult have they made it for you to do your work, take care of things at home, or get along with other people? Somewhat difficult     Review of Systems   Constitutional, HEENT, cardiovascular, pulmonary, gi and gu systems are negative, except as otherwise noted.      Objective    /68 (BP Location: Left arm, Patient Position: Sitting, Cuff Size: Adult Regular)   Pulse 74   Temp 98.2  F (36.8  C) (Tympanic)   Resp 16   Ht 1.676 m (5' 6\")   Wt 77.1 kg (170 lb)   SpO2 97%   BMI 27.44 kg/m    Body mass index is 27.44 kg/m .  Physical Exam   GENERAL: alert and no distress  EYES: Eyes grossly normal to inspection  RESP: lungs clear to auscultation - no rales, rhonchi or wheezes  CV: regular rates and rhythm, normal S1 S2, no S3 or S4, no murmur, click or rub and no peripheral edema  MS: no gross musculoskeletal defects noted, no edema  SKIN: no suspicious lesions or rashes  PSYCH: mentation appears normal, affect normal/bright    No results found for any visits on 09/20/22.          "

## 2022-09-20 NOTE — NURSING NOTE
"Chief Complaint   Patient presents with     Recheck Medication       Initial /68 (BP Location: Left arm, Patient Position: Sitting, Cuff Size: Adult Regular)   Pulse 74   Temp 98.2  F (36.8  C) (Tympanic)   Resp 16   Ht 1.676 m (5' 6\")   Wt 77.1 kg (170 lb)   SpO2 97%   BMI 27.44 kg/m   Estimated body mass index is 27.44 kg/m  as calculated from the following:    Height as of this encounter: 1.676 m (5' 6\").    Weight as of this encounter: 77.1 kg (170 lb).  Medication Reconciliation: complete  Marysol Chen LPN  "

## 2022-09-22 ENCOUNTER — TELEPHONE (OUTPATIENT)
Dept: CONSULT | Facility: CLINIC | Age: 47
End: 2022-09-22

## 2022-09-22 NOTE — TELEPHONE ENCOUNTER
LVM for patient to call back to schedule GC only visit for PGx testing. My direct number provided.

## 2022-10-10 ENCOUNTER — VIRTUAL VISIT (OUTPATIENT)
Dept: CONSULT | Facility: CLINIC | Age: 47
End: 2022-10-10
Attending: FAMILY MEDICINE
Payer: COMMERCIAL

## 2022-10-10 DIAGNOSIS — F41.1 GAD (GENERALIZED ANXIETY DISORDER): ICD-10-CM

## 2022-10-10 DIAGNOSIS — Z71.83 ENCOUNTER FOR NONPROCREATIVE GENETIC COUNSELING AND TESTING: Primary | ICD-10-CM

## 2022-10-10 DIAGNOSIS — Z13.71 ENCOUNTER FOR NONPROCREATIVE GENETIC COUNSELING AND TESTING: Primary | ICD-10-CM

## 2022-10-10 PROCEDURE — 96040 HC GENETIC COUNSELING, EACH 30 MINUTES: CPT | Mod: GT,95

## 2022-10-10 NOTE — LETTER
10/10/2022      RE: Anabella Albrecht  [address]  [facility] [id number]     Dear Colleague,    Thank you for the opportunity to participate in the care of your patient, Anabella Albrecht, at the [facility] at [facility]. Please see a copy of my visit note below.    Anabella Albrecht  is being evaluated via a billable video visit.      How would you like to obtain your AVS? MyChart  For the video visit, send the invitation by: Text to cell phone: [phone]  Will anyone else be joining your video visit? No          Date of visit: 10/10/22    Presenting Information:   Anabella Albrecht is a 47 year old female referred to the [facility] at the request of Karen Barraza MD today. She was seen for a genetic counseling appointment to discuss the details of pharmacogenomic testing, including her personal medical history, personal medication history including adverse medication responses, her family history of relevant medication responses, and testing logistics.     Felicia reports a history of generalized anxiety disorder (JASON) without mood swings. She notes that she thinks she sometimes needs to up the medications, given that she's having breakthrough anxiety. She has been tracking breakthrough anxiety with menstrual cycle and suspects it may play a role in her emotional state.      Anabella has previously tried the following medications:    Prozac 20mg - has been taking it a few years.     She reports that it does work most of the time, but there are times where she experiences anxiety breakthrough.    She was on a higher dose but noticed that she was having a lot of trouble sleeping, resulting in her lowering dose.     Has not tried any other medications at this time.     Felicia is pursuing pharmacogenetic testing because she hopes to have improved anxiety management or to resolve side effects such as interruptions to  "her sleep patterns. She has no other health concerns. Has had some weight gain recently and is unsure if that's related, but doesn't attribute medication to her weight gain.    Family History: A three generation pedigree was obtained and scanned into the electronic medical record. The relevant portions are described below:      Children- Felicia has two sons, age 20 and 15. The 20 year old was diagnosed with keratoconus and has had multiple surgeries to bring it under control. Her 15 year old son is being monitored for the condition but does not show signs yet.      Siblings- Felicia has a sister who is 48 and well. She, like Felicia, has a history of menstrual concerns and recently underwent a hysterectomy. She too struggles with anxiety. Felicia's sister has two daughters who are both healthy.     Parents- Felicia's parents are living. Her mother is 72 and has high blood pressure and anxiety. Her father is 75 and well, though he had a cancer \"behind his eye\" at age 70. He has recovered from that cancer.     Maternal Relatives- Felicia's maternal grandmother had pancreatic cancer at age 69.     Paternal Relatives- Felicia's paternal grandmother  of stroke at age 60.     Family history is otherwise largely non-contributory. Maternal ancestry is [place] and paternal ancestry is Frisian. Consanguinity was denied.     Genetic Counseling Discussion:  For review, our bodies are made of cells that contain our chromosomes which are made up of long stretches of DNA containing our genes. Our genes serve as the instructions for our bodies to grow and function. We have two copies of each gene, one inherited from our mother and one inherited from our father.     What pharmacogenomic testing can tell us:  There are several factors that can determine how an individual responds to medications. Some of these factors include environmental factors such as lifestyle choices (diet, alcohol and/or tobacco use) or other medications an individual might " be taking, and other factors can include a person's age, sex, ethnic background, disease, and underlying genetic factors. There are several genes in our body that provide instructions for breaking down the medications we take. Changes in these genes (sometimes called variants or polymorphisms/SNPs) can alter how the body breaks down certain medications. For example, a change in a gene can slow down the process of medication breakdown leading to too much of that medication/drug in the body which can cause side effects. On the other hand, a change in a gene can speed up the breakdown of a medication so a therapeutic level is not reached and the medication may not work well at the standard doses. Therefore, identifying changes in these genes via pharmacogenomic testing can help guide which medications might work best for an individual, what dose of a medication may be best, or if a certain medication has a high risk for causing serious side effects.     The pharmacogenomic testing offered at [facility] analyzes several different polymorphisms in different genes associated with drug metabolism. These variants have been determined to be medically actionable by practice guidelines including CPIC (Clinical Pharmacogenetics Implementation Consortium), PharmGKB and/or FDA gene-drug interaction guidelines. Therefore, prescribing recommendations can be made by the results of this test, so this testing for Anabella is DIAGNOSTIC and is NOT investigational.     The results of this test can provide guidance for several different types of drugs such as antiinflammatories, antithrombotics, lipid-lowering medication, acid-lowering medications, antiemetics, immunosuppressants, antivirals, antifungals, antidepressants, ADHD medications, antimetabolites, and/or hormone antagonists. This means that, while this testing was recommended for a specific reason right now (Anabella's mental health), it may provide guidance for future  medication use.     As previously mentioned, we inherit our genes from our parents. This means that some of the pharmacogenomic variants found on this test may be shared by other relatives. These results could be helpful to share with other relatives, but it is important to remember that there are many factors that can contribute to how an individual responds to medications. Relatives should consider their own pharmacogenomics testing to better determine their recommendations and they should consult with their health care providers before making any changes.     What pharmacogenomic testing cannot tell us:  This pharmacogenomic testing is not looking at every known pharmacogenomic polymorphism and therefore the results cannot tell us about every possible gene-drug interaction. It is also not looking at genes outside of the scope of pharmacogenomics, and therefore, the results will not tell us if Anabella is at risk for other genetic conditions. If Anabella has questions about a possible underlying genetic condition, she should talk with her primary care provider about seeking an appointment in our Genetics Clinic.     Testing logistics:  [facility] will try to obtain insurance coverage for the pharmacogenomic testing; however, this is not always a covered service. A cost waiver form (Medicare replacement non-coverage form) is required, but cost to the patient is not expected to exceed $350.     A blood or buccal sample will be collected for DNA analysis. The results of this test take 1-2 weeks. An appointment will be made with the pharmacist to discuss these results in detail and to discuss the medication recommendations based on these results. These test results will be accessible in [email or web address] and may be viewable prior to the appointment with the pharmacist, but NO CHANGES SHOULD BE MADE TO MEDICATIONS BEFORE TALKING TO THE PHARMACIST OR HEALTHCARE PROVIDER.     Anabella consented to pharmacogenomic testing today.  The insurance and billing were explained and Anabella agreed to the billing plan. Due to the fact that this was a virtual visit, Anabella gave me verbal permission/consent to sign the genetic testing consent form and the cost waiver form (Medicare replacement non-coverage form) on her behalf.     It was a pleasure meeting with Anabella Albrecht today. She vocalized understanding of the information we discussed today and all her questions and concerns were addressed. She has been provided with my contact information should any questions arise regarding our visit or plan moving forward. In total, 35 minutes were spent in televideo counseling with this patient.     Plan:  1. Anabella will arrange a lab appointment at a [place] laboratory to have a blood sample drawn for the [facility] Pharmacogenomic Test.   2. Anabella will be scheduled with one of our [facility] pharmacists 1-2 weeks after her sample is collected to review the results of the Pharmacogenomics Profile. This appointment can be scheduled by calling [phone] after the sample is collected.     Hortencia Viramontes MS, [facility]  Genetic Counseling Intern  [facility]   Phone: [phone]  Email: [email or web address]

## 2022-10-10 NOTE — PROGRESS NOTES
Date of visit: 10/10/22    Presenting Information:   Anabella Albrecht is a 47 year old female referred to the [facility] at the request of Karen Barraza MD today. She was seen for a genetic counseling appointment to discuss the details of pharmacogenomic testing, including her personal medical history, personal medication history including adverse medication responses, her family history of relevant medication responses, and testing logistics.     Felicia reports a history of generalized anxiety disorder (JASON) without mood swings. She notes that she thinks she sometimes needs to up the medications, given that she's having breakthrough anxiety. She has been tracking breakthrough anxiety with menstrual cycle and suspects it may play a role in her emotional state.      Anabella has previously tried the following medications:    Prozac 20mg - has been taking it a few years.     She reports that it does work most of the time, but there are times where she experiences anxiety breakthrough.    She was on a higher dose but noticed that she was having a lot of trouble sleeping, resulting in her lowering dose.     Has not tried any other medications at this time.     Felicia is pursuing pharmacogenetic testing because she hopes to have improved anxiety management or to resolve side effects such as interruptions to her sleep patterns. She has no other health concerns. Has had some weight gain recently and is unsure if that's related, but doesn't attribute medication to her weight gain.    Family History: A three generation pedigree was obtained and scanned into the electronic medical record. The relevant portions are described below:      Children- Felicia has two sons, age 20 and 15. The 20 year old was diagnosed with keratoconus and has had multiple surgeries to bring it under control. Her 15 year old son is being monitored for the condition but does not show signs yet.      Siblings- Felicia has a sister who is  "48 and well. She, like Felicia, has a history of menstrual concerns and recently underwent a hysterectomy. She too struggles with anxiety. Felicia's sister has two daughters who are both healthy.     Parents- Felicia's parents are living. Her mother is 72 and has high blood pressure and anxiety. Her father is 75 and well, though he had a cancer \"behind his eye\" at age 70. He has recovered from that cancer.     Maternal Relatives- Felicia's maternal grandmother had pancreatic cancer at age 69.     Paternal Relatives- Felicia's paternal grandmother  of stroke at age 60.     Family history is otherwise largely non-contributory. Maternal ancestry is Persian and paternal ancestry is [place]. Consanguinity was denied.     Genetic Counseling Discussion:  For review, our bodies are made of cells that contain our chromosomes which are made up of long stretches of DNA containing our genes. Our genes serve as the instructions for our bodies to grow and function. We have two copies of each gene, one inherited from our mother and one inherited from our father.     What pharmacogenomic testing can tell us:  There are several factors that can determine how an individual responds to medications. Some of these factors include environmental factors such as lifestyle choices (diet, alcohol and/or tobacco use) or other medications an individual might be taking, and other factors can include a person's age, sex, ethnic background, disease, and underlying genetic factors. There are several genes in our body that provide instructions for breaking down the medications we take. Changes in these genes (sometimes called variants or polymorphisms/SNPs) can alter how the body breaks down certain medications. For example, a change in a gene can slow down the process of medication breakdown leading to too much of that medication/drug in the body which can cause side effects. On the other hand, a change in a gene can speed up the breakdown of a medication " so a therapeutic level is not reached and the medication may not work well at the standard doses. Therefore, identifying changes in these genes via pharmacogenomic testing can help guide which medications might work best for an individual, what dose of a medication may be best, or if a certain medication has a high risk for causing serious side effects.     The pharmacogenomic testing offered at [facility] analyzes several different polymorphisms in different genes associated with drug metabolism. These variants have been determined to be medically actionable by practice guidelines including CPIC (Clinical Pharmacogenetics Implementation Consortium), PharmGKB and/or FDA gene-drug interaction guidelines. Therefore, prescribing recommendations can be made by the results of this test, so this testing for Anabella is DIAGNOSTIC and is NOT investigational.     The results of this test can provide guidance for several different types of drugs such as antiinflammatories, antithrombotics, lipid-lowering medication, acid-lowering medications, antiemetics, immunosuppressants, antivirals, antifungals, antidepressants, ADHD medications, antimetabolites, and/or hormone antagonists. This means that, while this testing was recommended for a specific reason right now (Anabella's mental health), it may provide guidance for future medication use.     As previously mentioned, we inherit our genes from our parents. This means that some of the pharmacogenomic variants found on this test may be shared by other relatives. These results could be helpful to share with other relatives, but it is important to remember that there are many factors that can contribute to how an individual responds to medications. Relatives should consider their own pharmacogenomics testing to better determine their recommendations and they should consult with their health care providers before making any changes.     What pharmacogenomic testing cannot tell  us:  This pharmacogenomic testing is not looking at every known pharmacogenomic polymorphism and therefore the results cannot tell us about every possible gene-drug interaction. It is also not looking at genes outside of the scope of pharmacogenomics, and therefore, the results will not tell us if Anabella is at risk for other genetic conditions. If Anabella has questions about a possible underlying genetic condition, she should talk with her primary care provider about seeking an appointment in our Genetics Clinic.     Testing logistics:  [facility] will try to obtain insurance coverage for the pharmacogenomic testing; however, this is not always a covered service. A cost waiver form (Medicare replacement non-coverage form) is required, but cost to the patient is not expected to exceed $350.     A blood or buccal sample will be collected for DNA analysis. The results of this test take 1-2 weeks. An appointment will be made with the pharmacist to discuss these results in detail and to discuss the medication recommendations based on these results. These test results will be accessible in [email or web address] and may be viewable prior to the appointment with the pharmacist, but NO CHANGES SHOULD BE MADE TO MEDICATIONS BEFORE TALKING TO THE PHARMACIST OR HEALTHCARE PROVIDER.     Anabella consented to pharmacogenomic testing today. The insurance and billing were explained and Anabella agreed to the billing plan. Due to the fact that this was a virtual visit, Anabella gave me verbal permission/consent to sign the genetic testing consent form and the cost waiver form (Medicare replacement non-coverage form) on her behalf.     It was a pleasure meeting with Anabella JAUREGUI Trena today. She vocalized understanding of the information we discussed today and all her questions and concerns were addressed. She has been provided with my contact information should any questions arise regarding our visit or plan moving forward. In total, 35 minutes were  spent in televideo counseling with this patient.     Plan:  1. Anabella will arrange a lab appointment at a [place] laboratory to have a blood sample drawn for the [facility] Pharmacogenomic Test.   2. Anabella will be scheduled with one of our [place] pharmacists 1-2 weeks after her sample is collected to review the results of the Pharmacogenomics Profile. This appointment can be scheduled by calling [phone] after the sample is collected.     Hortencia Viramontes MS, [facility]  Genetic Counseling Intern  [facility]   Phone: [phone]  Email: [email or web address]

## 2022-10-25 LAB — NONINV COLON CA DNA+OCC BLD SCRN STL QL: NEGATIVE

## 2022-11-11 ENCOUNTER — MYC MEDICAL ADVICE (OUTPATIENT)
Dept: CONSULT | Facility: CLINIC | Age: 47
End: 2022-11-11

## 2022-11-29 ENCOUNTER — TELEPHONE (OUTPATIENT)
Dept: GENERAL RADIOLOGY | Facility: HOSPITAL | Age: 47
End: 2022-11-29

## 2022-11-29 ENCOUNTER — OFFICE VISIT (OUTPATIENT)
Dept: FAMILY MEDICINE | Facility: OTHER | Age: 47
End: 2022-11-29
Attending: FAMILY MEDICINE
Payer: COMMERCIAL

## 2022-11-29 ENCOUNTER — ANCILLARY PROCEDURE (OUTPATIENT)
Dept: MAMMOGRAPHY | Facility: OTHER | Age: 47
End: 2022-11-29
Attending: FAMILY MEDICINE
Payer: COMMERCIAL

## 2022-11-29 VITALS
BODY MASS INDEX: 28.28 KG/M2 | WEIGHT: 176 LBS | HEIGHT: 66 IN | HEART RATE: 72 BPM | DIASTOLIC BLOOD PRESSURE: 64 MMHG | OXYGEN SATURATION: 99 % | TEMPERATURE: 97.9 F | RESPIRATION RATE: 16 BRPM | SYSTOLIC BLOOD PRESSURE: 100 MMHG

## 2022-11-29 DIAGNOSIS — F41.1 GAD (GENERALIZED ANXIETY DISORDER): ICD-10-CM

## 2022-11-29 DIAGNOSIS — M41.20 IDIOPATHIC SCOLIOSIS AND KYPHOSCOLIOSIS: ICD-10-CM

## 2022-11-29 DIAGNOSIS — F32.81 PMDD (PREMENSTRUAL DYSPHORIC DISORDER): ICD-10-CM

## 2022-11-29 DIAGNOSIS — Z00.00 ROUTINE GENERAL MEDICAL EXAMINATION AT A HEALTH CARE FACILITY: Primary | ICD-10-CM

## 2022-11-29 DIAGNOSIS — Z13.220 SCREENING FOR HYPERLIPIDEMIA: ICD-10-CM

## 2022-11-29 DIAGNOSIS — E55.9 VITAMIN D DEFICIENCY: ICD-10-CM

## 2022-11-29 DIAGNOSIS — Z12.31 VISIT FOR SCREENING MAMMOGRAM: ICD-10-CM

## 2022-11-29 DIAGNOSIS — N92.0 MENORRHAGIA WITH REGULAR CYCLE: ICD-10-CM

## 2022-11-29 LAB
ALBUMIN SERPL BCG-MCNC: 4.2 G/DL (ref 3.5–5.2)
ALP SERPL-CCNC: 52 U/L (ref 35–104)
ALT SERPL W P-5'-P-CCNC: 17 U/L (ref 10–35)
ANION GAP SERPL CALCULATED.3IONS-SCNC: 9 MMOL/L (ref 7–15)
AST SERPL W P-5'-P-CCNC: 21 U/L (ref 10–35)
BASOPHILS # BLD AUTO: 0.1 10E3/UL (ref 0–0.2)
BASOPHILS NFR BLD AUTO: 1 %
BILIRUB SERPL-MCNC: 0.3 MG/DL
BUN SERPL-MCNC: 12.6 MG/DL (ref 6–20)
CALCIUM SERPL-MCNC: 9 MG/DL (ref 8.6–10)
CHLORIDE SERPL-SCNC: 101 MMOL/L (ref 98–107)
CHOLEST SERPL-MCNC: 176 MG/DL
CREAT SERPL-MCNC: 0.91 MG/DL (ref 0.51–0.95)
DEPRECATED HCO3 PLAS-SCNC: 24 MMOL/L (ref 22–29)
EOSINOPHIL # BLD AUTO: 0.1 10E3/UL (ref 0–0.7)
EOSINOPHIL NFR BLD AUTO: 2 %
ERYTHROCYTE [DISTWIDTH] IN BLOOD BY AUTOMATED COUNT: 12.9 % (ref 10–15)
GFR SERPL CREATININE-BSD FRML MDRD: 79 ML/MIN/1.73M2
GLUCOSE SERPL-MCNC: 98 MG/DL (ref 70–99)
HCT VFR BLD AUTO: 38.1 % (ref 35–47)
HDLC SERPL-MCNC: 56 MG/DL
HGB BLD-MCNC: 13 G/DL (ref 11.7–15.7)
IMM GRANULOCYTES # BLD: 0 10E3/UL
IMM GRANULOCYTES NFR BLD: 0 %
LAB DIRECTOR RESULTS: NORMAL
LDLC SERPL CALC-MCNC: 101 MG/DL
LYMPHOCYTES # BLD AUTO: 2.3 10E3/UL (ref 0.8–5.3)
LYMPHOCYTES NFR BLD AUTO: 36 %
MCH RBC QN AUTO: 30 PG (ref 26.5–33)
MCHC RBC AUTO-ENTMCNC: 34.1 G/DL (ref 31.5–36.5)
MCV RBC AUTO: 88 FL (ref 78–100)
MONOCYTES # BLD AUTO: 0.4 10E3/UL (ref 0–1.3)
MONOCYTES NFR BLD AUTO: 6 %
NEUTROPHILS # BLD AUTO: 3.5 10E3/UL (ref 1.6–8.3)
NEUTROPHILS NFR BLD AUTO: 55 %
NONHDLC SERPL-MCNC: 120 MG/DL
NRBC # BLD AUTO: 0 10E3/UL
NRBC BLD AUTO-RTO: 0 /100
PLATELET # BLD AUTO: 276 10E3/UL (ref 150–450)
POTASSIUM SERPL-SCNC: 3.9 MMOL/L (ref 3.4–5.3)
PROT SERPL-MCNC: 7 G/DL (ref 6.4–8.3)
RBC # BLD AUTO: 4.33 10E6/UL (ref 3.8–5.2)
SODIUM SERPL-SCNC: 134 MMOL/L (ref 136–145)
TRIGL SERPL-MCNC: 96 MG/DL
WBC # BLD AUTO: 6.4 10E3/UL (ref 4–11)

## 2022-11-29 PROCEDURE — 81227 CYP2C9 GENE COM VARIANTS: CPT | Performed by: FAMILY MEDICINE

## 2022-11-29 PROCEDURE — 77067 SCR MAMMO BI INCL CAD: CPT | Mod: TC | Performed by: RADIOLOGY

## 2022-11-29 PROCEDURE — 90471 IMMUNIZATION ADMIN: CPT | Performed by: FAMILY MEDICINE

## 2022-11-29 PROCEDURE — 80053 COMPREHEN METABOLIC PANEL: CPT | Performed by: FAMILY MEDICINE

## 2022-11-29 PROCEDURE — 85025 COMPLETE CBC W/AUTO DIFF WBC: CPT | Performed by: FAMILY MEDICINE

## 2022-11-29 PROCEDURE — 99396 PREV VISIT EST AGE 40-64: CPT | Mod: 25 | Performed by: FAMILY MEDICINE

## 2022-11-29 PROCEDURE — G0452 MOLECULAR PATHOLOGY INTERPR: HCPCS | Mod: 26 | Performed by: PATHOLOGY

## 2022-11-29 PROCEDURE — 81328 SLCO1B1 GENE COM VARIANTS: CPT | Performed by: FAMILY MEDICINE

## 2022-11-29 PROCEDURE — 82306 VITAMIN D 25 HYDROXY: CPT | Performed by: FAMILY MEDICINE

## 2022-11-29 PROCEDURE — 90715 TDAP VACCINE 7 YRS/> IM: CPT | Performed by: FAMILY MEDICINE

## 2022-11-29 PROCEDURE — 81350 UGT1A1 GENE COMMON VARIANTS: CPT | Performed by: FAMILY MEDICINE

## 2022-11-29 PROCEDURE — 80061 LIPID PANEL: CPT | Performed by: FAMILY MEDICINE

## 2022-11-29 PROCEDURE — 77063 BREAST TOMOSYNTHESIS BI: CPT | Mod: TC | Performed by: RADIOLOGY

## 2022-11-29 PROCEDURE — 36415 COLL VENOUS BLD VENIPUNCTURE: CPT | Performed by: FAMILY MEDICINE

## 2022-11-29 PROCEDURE — 81225 CYP2C19 GENE COM VARIANTS: CPT | Performed by: FAMILY MEDICINE

## 2022-11-29 PROCEDURE — 81232 DPYD GENE COMMON VARIANTS: CPT | Performed by: FAMILY MEDICINE

## 2022-11-29 PROCEDURE — 81335 TPMT GENE COM VARIANTS: CPT | Performed by: FAMILY MEDICINE

## 2022-11-29 PROCEDURE — 81231 CYP3A5 GENE COMMON VARIANTS: CPT | Performed by: FAMILY MEDICINE

## 2022-11-29 PROCEDURE — 81226 CYP2D6 GENE COM VARIANTS: CPT | Performed by: FAMILY MEDICINE

## 2022-11-29 ASSESSMENT — ENCOUNTER SYMPTOMS
FEVER: 0
JOINT SWELLING: 1
HEMATURIA: 0
NERVOUS/ANXIOUS: 0
PARESTHESIAS: 0
DYSURIA: 0
FREQUENCY: 0
EYE PAIN: 0
COUGH: 0
BREAST MASS: 0
CONSTIPATION: 0
CHILLS: 0
HEADACHES: 0
NAUSEA: 0
SHORTNESS OF BREATH: 0
DIARRHEA: 0
ABDOMINAL PAIN: 0
ARTHRALGIAS: 1
HEMATOCHEZIA: 0
MYALGIAS: 1
WEAKNESS: 0
SORE THROAT: 0
HEARTBURN: 0
DIZZINESS: 0
PALPITATIONS: 0

## 2022-11-29 ASSESSMENT — PAIN SCALES - GENERAL: PAINLEVEL: NO PAIN (0)

## 2022-11-29 NOTE — PROGRESS NOTES
SUBJECTIVE:   CC: Felicia is an 47 year old who presents for preventive health visit.       Patient has been advised of split billing requirements and indicates understanding: Yes     Healthy Habits:     Getting at least 3 servings of Calcium per day:  Yes    Bi-annual eye exam:  Yes    Dental care twice a year:  Yes    Sleep apnea or symptoms of sleep apnea:  None    Diet:  Regular (no restrictions)    Frequency of exercise:  2-3 days/week    Duration of exercise:  30-45 minutes    Taking medications regularly:  Yes    Medication side effects:  None    PHQ-2 Total Score: 0    Additional concerns today:  No      Today's PHQ-2 Score:   PHQ-2 ( 1999 [employer]) 4/22/2022   Q1: Little interest or pleasure in doing things 0   Q2: Feeling down, depressed or hopeless 0   PHQ-2 Score 0   PHQ-2 Total Score (12-17 Years)- Positive if 3 or more points; Administer PHQ-A if positive -           Social History     Tobacco Use     Smoking status: Never     Smokeless tobacco: Never   Substance Use Topics     Alcohol use: Yes     Comment: Rarely      If you drink alcohol do you typically have >3 drinks per day or >7 drinks per week? No    No flowsheet data found.No flowsheet data found.    Reviewed orders with patient.  Reviewed health maintenance and updated orders accordingly - Yes    Cancer Screenings:  Colon - Due 2025.  Cervical - Due 2025  Breast - Due  Lung - NA  Prostate - NA  Skin - No lesions of concern, no FH of melanoma.     Immunizations:  Tdap - Due  Zoster - NA  Influenza - Due declines  Prevnar - NA  Pneumovax - NA  COVID - J and J, declines boosters.     Cardiovascular:  Fasting glucose/Hgb A1C:   Cholesterol:   ASCVD score:     The 10-year ASCVD risk score (Mariajose FERREIRA, et al., 2019) is: 0.5%    Values used to calculate the score:      Age: 47 years      Sex: Female      Is Non- : No      Diabetic: No      Tobacco smoker: No      Systolic Blood Pressure: 100 mmHg      Is BP treated: No      HDL  Cholesterol: 52 mg/dL      Total Cholesterol: 178 mg/dL      Other:  Osteoporosis - Normal risk.   --Vitamin D - pending    Breast Cancer Screening:  Any new diagnosis of family breast, ovarian, or bowel cancer? No    FHS-7:   Breast CA Risk Assessment (FHS-7) 9/22/2021   Did any of your first-degree relatives have breast or ovarian cancer? Yes     Pertinent mammograms are reviewed under the imaging tab.    History of abnormal Pap smear: NO - age 30-65 PAP every 5 years with negative HPV co-testing recommended  PAP / HPV Latest Ref Rng & Units 6/30/2020 4/26/2017 2/10/2015   PAP (Historical) - NIL NIL NIL   HPV16 NEG:Negative Negative Negative -   HPV18 NEG:Negative Negative Negative -   HRHPV NEG:Negative Negative Negative -     Reviewed and updated as needed this visit by clinical staff   Tobacco   Meds              Reviewed and updated as needed this visit by Provider                     Review of Systems   Constitutional: Negative for chills and fever.   HENT: Negative for congestion, ear pain, hearing loss and sore throat.    Eyes: Negative for pain and visual disturbance.   Respiratory: Negative for cough and shortness of breath.    Cardiovascular: Negative for chest pain, palpitations and peripheral edema.   Gastrointestinal: Negative for abdominal pain, constipation, diarrhea, heartburn, hematochezia and nausea.   Breasts:  Negative for tenderness, breast mass and discharge.   Genitourinary: Negative for dysuria, frequency, genital sores, hematuria, pelvic pain, urgency, vaginal bleeding and vaginal discharge.   Musculoskeletal: Positive for arthralgias, joint swelling and myalgias.   Skin: Negative for rash.   Neurological: Negative for dizziness, weakness, headaches and paresthesias.   Psychiatric/Behavioral: Negative for mood changes. The patient is not nervous/anxious.      OBJECTIVE:   /64 (BP Location: Left arm, Patient Position: Sitting, Cuff Size: Adult Regular)   Pulse 72   Temp 97.9  F  "(36.6  C) (Tympanic)   Resp 16   Ht 1.676 m (5' 6\")   Wt 79.8 kg (176 lb)   SpO2 99%   BMI 28.41 kg/m    Physical Exam  GENERAL APPEARANCE: healthy, alert and no distress  EYES: Eyes grossly normal to inspection, PERRL and conjunctivae and sclerae normal  HENT: ear canals and TM's normal, nose and mouth without ulcers or lesions, oropharynx clear and oral mucous membranes moist  NECK: no adenopathy, no asymmetry, masses, or scars and thyroid normal to palpation  RESP: lungs clear to auscultation - no rales, rhonchi or wheezes  BREAST: normal without masses, tenderness or nipple discharge and no palpable axillary masses or adenopathy  CV: regular rate and rhythm, normal S1 S2, no S3 or S4, no murmur, click or rub, no peripheral edema and peripheral pulses strong  ABDOMEN: soft, nontender, no hepatosplenomegaly, no masses and bowel sounds normal  MS: no musculoskeletal defects are noted and gait is age appropriate without ataxia  SKIN: no suspicious lesions or rashes  NEURO: Normal strength and tone, sensory exam grossly normal, mentation intact and speech normal  PSYCH: mentation appears normal and affect normal/bright    Diagnostic Test Results:  Labs reviewed in Epic  Results for orders placed or performed in visit on 11/29/22   Lipid Profile (Chol, Trig, HDL, LDL calc)     Status: Abnormal   Result Value Ref Range    Cholesterol 176 <200 mg/dL    Triglycerides 96 <150 mg/dL    Direct Measure HDL 56 >=50 mg/dL    LDL Cholesterol Calculated 101 (H) <=100 mg/dL    Non HDL Cholesterol 120 <130 mg/dL    Narrative    Cholesterol  Desirable:  <200 mg/dL    Triglycerides  Normal:  Less than 150 mg/dL  Borderline High:  150-199 mg/dL  High:  200-499 mg/dL  Very High:  Greater than or equal to 500 mg/dL    Direct Measure HDL  Female:  Greater than or equal to 50 mg/dL   Male:  Greater than or equal to 40 mg/dL    LDL Cholesterol  Desirable:  <100mg/dL  Above Desirable:  100-129 mg/dL   Borderline High:  130-159 mg/dL "   High:  160-189 mg/dL   Very High:  >= 190 mg/dL    Non HDL Cholesterol  Desirable:  130 mg/dL  Above Desirable:  130-159 mg/dL  Borderline High:  160-189 mg/dL  High:  190-219 mg/dL  Very High:  Greater than or equal to 220 mg/dL   Comprehensive metabolic panel (BMP + Alb, Alk Phos, ALT, AST, Total. Bili, TP)     Status: Abnormal   Result Value Ref Range    Sodium 134 (L) 136 - 145 mmol/L    Potassium 3.9 3.4 - 5.3 mmol/L    Chloride 101 98 - 107 mmol/L    Carbon Dioxide (CO2) 24 22 - 29 mmol/L    Anion Gap 9 7 - 15 mmol/L    Urea Nitrogen 12.6 6.0 - 20.0 mg/dL    Creatinine 0.91 0.51 - 0.95 mg/dL    Calcium 9.0 8.6 - 10.0 mg/dL    Glucose 98 70 - 99 mg/dL    Alkaline Phosphatase 52 35 - 104 U/L    AST 21 10 - 35 U/L    ALT 17 10 - 35 U/L    Protein Total 7.0 6.4 - 8.3 g/dL    Albumin 4.2 3.5 - 5.2 g/dL    Bilirubin Total 0.3 <=1.2 mg/dL    GFR Estimate 79 >60 mL/min/1.73m2   CBC with platelets and differential     Status: None   Result Value Ref Range    WBC Count 6.4 4.0 - 11.0 10e3/uL    RBC Count 4.33 3.80 - 5.20 10e6/uL    Hemoglobin 13.0 11.7 - 15.7 g/dL    Hematocrit 38.1 35.0 - 47.0 %    MCV 88 78 - 100 fL    MCH 30.0 26.5 - 33.0 pg    MCHC 34.1 31.5 - 36.5 g/dL    RDW 12.9 10.0 - 15.0 %    Platelet Count 276 150 - 450 10e3/uL    % Neutrophils 55 %    % Lymphocytes 36 %    % Monocytes 6 %    % Eosinophils 2 %    % Basophils 1 %    % Immature Granulocytes 0 %    NRBCs per 100 WBC 0 <1 /100    Absolute Neutrophils 3.5 1.6 - 8.3 10e3/uL    Absolute Lymphocytes 2.3 0.8 - 5.3 10e3/uL    Absolute Monocytes 0.4 0.0 - 1.3 10e3/uL    Absolute Eosinophils 0.1 0.0 - 0.7 10e3/uL    Absolute Basophils 0.1 0.0 - 0.2 10e3/uL    Absolute Immature Granulocytes 0.0 <=0.4 10e3/uL    Absolute NRBCs 0.0 10e3/uL   CBC with platelets and differential     Status: None    Narrative    The following orders were created for panel order CBC with platelets and differential.  Procedure                               Abnormality          "Status                     ---------                               -----------         ------                     CBC with platelets and d...[[id number]]                      Final result                 Please view results for these tests on the individual orders.       ASSESSMENT/PLAN:   Anabella was seen today for physical.    Diagnoses and all orders for this visit:    Routine general medical examination at a health care facility    Scoliosis (and kyphoscoliosis), idiopathic    Menorrhagia with regular cycle  -     Comprehensive metabolic panel (BMP + Alb, Alk Phos, ALT, AST, Total. Bili, TP); Future  -     CBC with platelets and differential; Future  -     Comprehensive metabolic panel (BMP + Alb, Alk Phos, ALT, AST, Total. Bili, TP)  -     CBC with platelets and differential    PMDD (premenstrual dysphoric disorder)    Screening for hyperlipidemia  -     Lipid Profile (Chol, Trig, HDL, LDL calc); Future  -     Lipid Profile (Chol, Trig, HDL, LDL calc)    Vitamin D deficiency  -     Vitamin D Deficiency; Future  -     Vitamin D Deficiency    JASON (generalized anxiety disorder)  -     Pharmacogenomics profile    Other orders  -     TDAP VACCINE (Adacel, Boostrix)  [[id number]]        Patient has been advised of split billing requirements and indicates understanding: Yes      COUNSELING:  Reviewed preventive health counseling, as reflected in patient instructions      BMI:   Estimated body mass index is 28.41 kg/m  as calculated from the following:    Height as of this encounter: 1.676 m (5' 6\").    Weight as of this encounter: 79.8 kg (176 lb).   Weight management plan: Discussed healthy diet and exercise guidelines      She reports that she has never smoked. She has never used smokeless tobacco.      Karen Barraza MD  [facility] [place]  "

## 2022-11-30 LAB — DEPRECATED CALCIDIOL+CALCIFEROL SERPL-MC: 36 UG/L (ref 20–75)

## 2022-12-09 ENCOUNTER — TELEPHONE (OUTPATIENT)
Dept: CONSULT | Facility: CLINIC | Age: 47
End: 2022-12-09

## 2022-12-19 ENCOUNTER — VIRTUAL VISIT (OUTPATIENT)
Dept: PHARMACY | Facility: PHYSICIAN GROUP | Age: 47
End: 2022-12-19
Payer: COMMERCIAL

## 2022-12-19 DIAGNOSIS — E88.89 CYP2B6 INTERMEDIATE METABOLIZER (H): ICD-10-CM

## 2022-12-19 DIAGNOSIS — F41.1 ANXIETY STATE: ICD-10-CM

## 2022-12-19 DIAGNOSIS — E88.89 CYP2C19 INTERMEDIATE METABOLIZER (H): ICD-10-CM

## 2022-12-19 DIAGNOSIS — E88.89 CYP3A5 INTERMEDIATE METABOLIZER (H): ICD-10-CM

## 2022-12-19 DIAGNOSIS — Z13.79 ENCOUNTER FOR PHARMACOGENETIC TESTING: Primary | ICD-10-CM

## 2022-12-19 PROCEDURE — 99607 MTMS BY PHARM ADDL 15 MIN: CPT

## 2022-12-19 PROCEDURE — 99605 MTMS BY PHARM NP 15 MIN: CPT

## 2022-12-19 NOTE — Clinical Note
Hi Dr. Barraza,   Please see my MTM note from my pharmacogenetics review with Felicia. Please reach out with any questions or concerns!  Terence Thomas, PharmD Medication Therapy Management Pharmacist [facility] Office phone: [phone]

## 2022-12-19 NOTE — PROGRESS NOTES
"Medication Therapy Management (MTM) Encounter    ASSESSMENT:                            Medication Adherence/Access: No issues identified    Pharmacogenetic Assessment and Recommendations: Variability in CYP2D6, BKE2Z62, CYP2C9 and other genes can impact the effectiveness and risk toxicity of certain medications used. Based on this patient's pharmacogenetic test results and clinical assessment, consider the followin. Based on current pharmacogenetic guidelines, if wanting to switch to a different antidepressant in the future, there is not any guidance limiting our options, even with her being a MUR6L32 intermediate metabolizer.   2. The patient may benefit from an as needed anti-anxiety medication such as hydroxyzine for breakthrough anxiety symptoms, or addition of an agent such as buspirone (although this is serotonergic in nature as well).       PLAN:                            The following recommendations are being made directly to Dr. Karen Barraza MD:  1. The patient is interested in continuing her current fluoxetine dose, if coverage of her sporadic breakthrough anxiety symptoms is desired, could consider addition of an as needed agent (such as hydroxyzine), or a daily agent (such as buspirone) to help with these symptoms. The patient reports breakthrough anxiety ~1 time per month on her current dose of fluoxetine.     Follow-up: 1 month, sooner if needed.     SUBJECTIVE/OBJECTIVE:                          Anabella Albrecht is a 47 year old female contacted via secure video for an initial visit. She was referred to me from Dr. Karen Barraza MD.      Reason for visit: Pharmacogenetic Results Review.    Allergies/ADRs: Reviewed in chart    Medication Adherence/Access: no issues reported    Pharmacogenetic (PGx) Results: Pharmacogenetic testing was ordered for Anabella Albrecht to assist in the treatment of anxiety. See \"Pharmacogenomics Profile\" in lab result tab for complete list of pharmacogenetic results. "     Results relevant for PGx consult reason:      VGS9Y88 intermediate metabolizer: decreased UNO7Z20 function    CYP2D6: normal metabolizer: normal CYP2D6 activity     Other notable PGx results:      CYP2B6 intermediate metabolizer: decreased CYP2B6 activity    CY intermediate metabolizer: decreased CY function    Drug interactions (as of 2022):    Interacting medication(s) Description of interaction Phenotype adjusted for drug interaction* Recommendation   Fluoxetine CYP2D6 Strong Inhibitor CYP2D6 poor metabolizer No dose adjustment of current medications required, may be required in the future if fluoxetine is continued.      *Phenotype only when drug interaction is present. If the interacting medication is discontinued, the patient will convert back to their original predicted phenotype. For more information about drug interactions and a more inclusive drug interaction reference the [place] Table.     Today's Vitals: There were no vitals taken for this visit.  ----------------  I spent 27 minutes with this patient today. I offer these suggestions for consideration by Dr. Karen Barraza MD. A copy of the visit note was provided to the patient's provider(s).    A summary of these recommendations was sent via [employer].    Terence Thomas, PharmD  Medication Therapy Management Pharmacist  [facility]  Office phone: [phone]    Telemedicine Visit Details  Type of service:  Video Conference via [employer]  Start Time: 9:00 AM  End Time: 9:27 AM  Originating Location (pt. Location): Home      Distant Location (provider location):  On-site  Provider has received verbal consent for a visit from the patient? Yes     Medication Therapy Recommendations  No medication therapy recommendations to display

## 2022-12-21 NOTE — PATIENT INSTRUCTIONS
"Recommendations from today's MTM visit:                                                    MTM (medication therapy management) is a service provided by a clinical pharmacist designed to help you get the most of out of your medicines.   Today we reviewed what your medicines are for, how to know if they are working, that your medicines are safe and how to make your medicine regimen as easy as possible.      The following recommendations are being made directly to Dr. Karen Barraza MD:  1. The patient is interested in continuing her current fluoxetine dose, if coverage of her sporadic breakthrough anxiety symptoms is desired, could consider addition of an as needed agent (such as hydroxyzine), or a daily agent (such as buspirone) to help with these symptoms. The patient reports breakthrough anxiety ~1 time per month on her current dose of fluoxetine.     Follow-up: 1 month, sooner if needed.     It was great speaking with you today.  I value your experience and would be very thankful for your time in providing feedback in our clinic survey. In the next few days, you may receive an email or text message from [employer] with a link to a survey related to your  clinical pharmacist.\"     To schedule another MTM appointment, please call the clinic directly or you may call the MTM scheduling line at [phone] or toll-free at [phone].     My Clinical Pharmacist's contact information:                                                      Please feel free to contact me with any questions or concerns you have.      Terence Thomas, PharmD  Medication Therapy Management Pharmacist  [facility]  Office phone: [phone]   "

## 2022-12-22 ENCOUNTER — MYC MEDICAL ADVICE (OUTPATIENT)
Dept: FAMILY MEDICINE | Facility: OTHER | Age: 47
End: 2022-12-22

## 2022-12-22 DIAGNOSIS — R41.840 ATTENTION DEFICIT: Primary | ICD-10-CM

## 2022-12-23 NOTE — TELEPHONE ENCOUNTER
I typically have adults evaluated with mental health for diagnosis and then I can take over. Referral was placed for testing.

## 2022-12-23 NOTE — TELEPHONE ENCOUNTER
Spoke with patient and gave her Dr. Barraza's recommendations.     She is aware someone will be reaching out for testing. And if she has issues or does not hear from someone to reach back out to us.   Isabel Jackson LPN

## 2023-09-05 ENCOUNTER — OFFICE VISIT (OUTPATIENT)
Dept: FAMILY MEDICINE | Facility: OTHER | Age: 48
End: 2023-09-05
Payer: COMMERCIAL

## 2023-09-05 ENCOUNTER — TELEPHONE (OUTPATIENT)
Dept: FAMILY MEDICINE | Facility: OTHER | Age: 48
End: 2023-09-05

## 2023-09-05 VITALS
RESPIRATION RATE: 16 BRPM | BODY MASS INDEX: 27.7 KG/M2 | WEIGHT: 171.6 LBS | TEMPERATURE: 98.3 F | HEART RATE: 76 BPM | DIASTOLIC BLOOD PRESSURE: 80 MMHG | SYSTOLIC BLOOD PRESSURE: 120 MMHG | OXYGEN SATURATION: 98 %

## 2023-09-05 DIAGNOSIS — H60.501 ACUTE OTITIS EXTERNA OF RIGHT EAR, UNSPECIFIED TYPE: Primary | ICD-10-CM

## 2023-09-05 DIAGNOSIS — H66.90 ACUTE OTITIS MEDIA, UNSPECIFIED OTITIS MEDIA TYPE: ICD-10-CM

## 2023-09-05 DIAGNOSIS — F41.1 GAD (GENERALIZED ANXIETY DISORDER): ICD-10-CM

## 2023-09-05 PROCEDURE — 99213 OFFICE O/P EST LOW 20 MIN: CPT

## 2023-09-05 RX ORDER — FLUOXETINE 40 MG/1
40 CAPSULE ORAL DAILY
Qty: 90 CAPSULE | Refills: 0 | Status: SHIPPED | OUTPATIENT
Start: 2023-09-05 | End: 2023-12-04

## 2023-09-05 RX ORDER — NEOMYCIN SULFATE, POLYMYXIN B SULFATE, HYDROCORTISONE 3.5; 10000; 1 MG/ML; [USP'U]/ML; MG/ML
3 SOLUTION/ DROPS AURICULAR (OTIC) 4 TIMES DAILY
Qty: 10 ML | Refills: 0 | Status: SHIPPED | OUTPATIENT
Start: 2023-09-05

## 2023-09-05 RX ORDER — FLUTICASONE PROPIONATE 50 MCG
1 SPRAY, SUSPENSION (ML) NASAL DAILY
Qty: 11.1 ML | Refills: 0 | Status: SHIPPED | OUTPATIENT
Start: 2023-09-05

## 2023-09-05 ASSESSMENT — PAIN SCALES - GENERAL: PAINLEVEL: EXTREME PAIN (9)

## 2023-09-05 NOTE — TELEPHONE ENCOUNTER
I can see her tomorrow at 3:30 if she is willing    NAMRATA Francisco CNP on 9/5/2023 at 10:31 AM

## 2023-09-05 NOTE — TELEPHONE ENCOUNTER
9:26 AM    Reason for Call: OVERBOOK    Patient is having the following symptoms: pt called think she might have an ear infection that is going into her sinuses started 09/01   The patient is requesting an appointment for sometime soon with Madi.    Was an appointment offered for this call? No  If yes : Appointment type              Date    Preferred method for responding to this message: Telephone Call  What is your phone number ? [phone]    If we cannot reach you directly, may we leave a detailed response at the number you provided? Yes    Can this message wait until your PCP/provider returns, if unavailable today? Not applicable    Bee Sher

## 2023-09-05 NOTE — PROGRESS NOTES
Assessment & Plan     Acute otitis externa of right ear, unspecified type  Swelling, redness and drainage in right ear canal- mild. Start drops as below. Encouraged to call with worsening symptoms.   - neomycin-polymyxin-hydrocortisone (CORTISPORIN) 3.5-87162-4 otic solution  Dispense: 10 mL; Refill: 0    Acute otitis media, unspecified  Mild, erythematous TM. Start augmentin.   - amoxicillin-clavulanate (AUGMENTIN) 875-125 MG tablet  Dispense: 10 tablet; Refill: 0  - fluticasone (FLONASE) 50 MCG/ACT nasal spray  Dispense: 11.1 mL; Refill: 0    JASON (generalized anxiety disorder)  Mood stable. Requesting refill.   - FLUoxetine (PROZAC) 40 MG capsule  Dispense: 90 capsule; Refill: 0      20 minutes spent by me on the date of the encounter doing chart review, history and exam, documentation and further activities per the note         No follow-ups on file.    NAMRATA Francisco [facility] - CHARLIE Duarte is a 48 year old, presenting for the following health issues:  Otitis Media (Sinus infection possibly )        9/5/2023    11:35 AM   Additional Questions   Roomed by ALIE Monroe   Accompanied by self         9/5/2023    11:35 AM   Patient Reported Additional Medications   Patient reports taking the following new medications no       HPI     Acute Illness  Acute illness concerns: Ear and sinus pain  Onset/Duration: Ear- last thursday, sinus- yesterday  Symptoms:  Fever: YES  Chills/Sweats: YES  Headache (location?): YES- forehead, behind eyes  Sinus Pressure: YES  Conjunctivitis:  YES- R eye drainage- yellow  Ear Pain: YES: both, R I smost  Rhinorrhea: YES  Congestion: YES  Sore Throat: YES  Cough: YES-non-productive, with shortness of breath, waxing and waning over time  Wheeze: No  Decreased Appetite: No  Nausea: No  Vomiting: No  Diarrhea: No  Dysuria/Freq.: No  Dysuria or Hematuria: No  Fatigue/Achiness: YES  Sick/Strep Exposure: No  Therapies tried and outcome: ibuprofen- has  helped.     - Started alcohol drops, ear pain drops to right ear  - Notes bilateral sinus pressure  - Notes chilled, no fevers- has not checked temp  - Mild cough, no SOB  - No sick contacts    Review of Systems   Constitutional, HEENT, cardiovascular, pulmonary, GI, , musculoskeletal, neuro, skin, endocrine and psych systems are negative, except as otherwise noted.      Objective    /80 (BP Location: Left arm, Patient Position: Sitting, Cuff Size: Adult Regular)   Pulse 76   Temp 98.3  F (36.8  C) (Tympanic)   Resp 16   Wt 77.8 kg (171 lb 9.6 oz)   SpO2 98%   BMI 27.70 kg/m    Body mass index is 27.7 kg/m .    Physical Exam  Constitutional:       General: She is not in acute distress.     Appearance: She is not ill-appearing.   HENT:      Head: Normocephalic and atraumatic.      Right Ear: Decreased hearing noted. Swelling and tenderness present. Tympanic membrane is erythematous. Tympanic membrane is not perforated or bulging.      Left Ear: Hearing, tympanic membrane and ear canal normal.      Ears:      Comments: Mild swelling, erythema of canal. Small amt of yellow drainage.      Nose: Nose normal. No congestion.      Mouth/Throat:      Mouth: Mucous membranes are moist.      Pharynx: No oropharyngeal exudate or posterior oropharyngeal erythema.   Eyes:      Conjunctiva/sclera: Conjunctivae normal.      Pupils: Pupils are equal, round, and reactive to light.   Cardiovascular:      Rate and Rhythm: Normal rate and regular rhythm.   Pulmonary:      Effort: Pulmonary effort is normal. No respiratory distress.      Breath sounds: No wheezing, rhonchi or rales.   Musculoskeletal:      Cervical back: Neck supple. No tenderness.   Lymphadenopathy:      Cervical: No cervical adenopathy.   Skin:     General: Skin is warm and dry.   Neurological:      Mental Status: She is alert.

## 2023-11-30 DIAGNOSIS — F41.1 GAD (GENERALIZED ANXIETY DISORDER): ICD-10-CM

## 2023-11-30 NOTE — TELEPHONE ENCOUNTER
Prozac  Last Written Prescription Date: 9/5/23  Last Fill Quantity: 90 # of Refills: 0  Last Office Visit: 9/5/23

## 2023-12-04 RX ORDER — FLUOXETINE 40 MG/1
40 CAPSULE ORAL DAILY
Qty: 90 CAPSULE | Refills: 2 | Status: SHIPPED | OUTPATIENT
Start: 2023-12-04 | End: 2024-09-24

## 2023-12-15 ENCOUNTER — APPOINTMENT (OUTPATIENT)
Dept: GENERAL RADIOLOGY | Facility: HOSPITAL | Age: 48
End: 2023-12-15
Attending: PHYSICIAN ASSISTANT
Payer: COMMERCIAL

## 2023-12-15 ENCOUNTER — HOSPITAL ENCOUNTER (EMERGENCY)
Facility: HOSPITAL | Age: 48
Discharge: HOME | End: 2023-12-15
Attending: PHYSICIAN ASSISTANT | Admitting: PHYSICIAN ASSISTANT
Payer: COMMERCIAL

## 2023-12-15 ENCOUNTER — TELEPHONE (OUTPATIENT)
Dept: FAMILY MEDICINE | Facility: OTHER | Age: 48
End: 2023-12-15

## 2023-12-15 VITALS
OXYGEN SATURATION: 97 % | TEMPERATURE: 97.7 F | HEART RATE: 75 BPM | DIASTOLIC BLOOD PRESSURE: 70 MMHG | SYSTOLIC BLOOD PRESSURE: 104 MMHG | RESPIRATION RATE: 20 BRPM

## 2023-12-15 DIAGNOSIS — J06.9 UPPER RESPIRATORY TRACT INFECTION, UNSPECIFIED TYPE: Primary | ICD-10-CM

## 2023-12-15 DIAGNOSIS — J06.9 URI (UPPER RESPIRATORY INFECTION): ICD-10-CM

## 2023-12-15 PROCEDURE — G0463 HOSPITAL OUTPT CLINIC VISIT: HCPCS

## 2023-12-15 PROCEDURE — 71046 X-RAY EXAM CHEST 2 VIEWS: CPT

## 2023-12-15 PROCEDURE — 99213 OFFICE O/P EST LOW 20 MIN: CPT | Performed by: PHYSICIAN ASSISTANT

## 2023-12-15 ASSESSMENT — ENCOUNTER SYMPTOMS
ABDOMINAL PAIN: 0
SHORTNESS OF BREATH: 0
CHILLS: 1
WHEEZING: 0
NAUSEA: 0
COUGH: 1
STRIDOR: 0
FEVER: 0
DIARRHEA: 0
FATIGUE: 1
VOICE CHANGE: 0
SINUS PRESSURE: 0
COLOR CHANGE: 0
CHEST TIGHTNESS: 0

## 2023-12-15 NOTE — DISCHARGE INSTRUCTIONS
Reviewed chest x-ray, no signs of pneumonia at this time    Symptoms likely related to viral illness, no specific medication warranted    Encourage fluid intake, rest, over-the-counter medications for symptom relief    Return to the emergency room/urgent care if develop difficulty with breathing, fevers uncontrolled greater than 102 F, or worsening symptoms

## 2023-12-15 NOTE — TELEPHONE ENCOUNTER
Left a voicemail message per Dr Rueda there is no openings and for the patient to go to Urgent Care.

## 2023-12-15 NOTE — TELEPHONE ENCOUNTER
8:39 AM    Reason for Call: OVERBOOK    Patient is having the following symptoms: High fever 103.00 bad cough and tired  for a week .    The patient is requesting an appointment for same day  with any open provider.    Was an appointment offered for this call? No  If yes : Appointment type              Date    Preferred method for responding to this message: Telephone Call  What is your phone number ? [phone]    If we cannot reach you directly, may we leave a detailed response at the number you provided? Yes    Can this message wait until your PCP/provider returns, if unavailable today? No,
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
## 2023-12-15 NOTE — ED TRIAGE NOTES
Pt reports with c/o having increased cough and cold symptoms that pt can't get rid of   Pt states its been going on for 2 weeks now   Pt reports cough is mostly dry   And has increased congestion and SOB when laying down   Denies any hx of asthma or smoking   Denies wanting covid testing   No otc meds taken today   Pt has been using mucinex and nyquil which has had some relief

## 2024-01-02 ENCOUNTER — OFFICE VISIT (OUTPATIENT)
Dept: FAMILY MEDICINE | Facility: OTHER | Age: 49
End: 2024-01-02
Attending: STUDENT IN AN ORGANIZED HEALTH CARE EDUCATION/TRAINING PROGRAM
Payer: COMMERCIAL

## 2024-01-02 VITALS
RESPIRATION RATE: 16 BRPM | TEMPERATURE: 97.7 F | BODY MASS INDEX: 27.92 KG/M2 | WEIGHT: 173 LBS | OXYGEN SATURATION: 100 % | DIASTOLIC BLOOD PRESSURE: 80 MMHG | HEART RATE: 73 BPM | SYSTOLIC BLOOD PRESSURE: 112 MMHG

## 2024-01-02 DIAGNOSIS — N30.00 ACUTE CYSTITIS WITHOUT HEMATURIA: ICD-10-CM

## 2024-01-02 DIAGNOSIS — R30.0 DYSURIA: Primary | ICD-10-CM

## 2024-01-02 LAB
ALBUMIN UR-MCNC: NEGATIVE MG/DL
APPEARANCE UR: CLEAR
BACTERIA #/AREA URNS HPF: ABNORMAL /HPF
BILIRUB UR QL STRIP: NEGATIVE
COLOR UR AUTO: ABNORMAL
GLUCOSE UR STRIP-MCNC: NEGATIVE MG/DL
HGB UR QL STRIP: NEGATIVE
HYALINE CASTS: 1 /LPF
KETONES UR STRIP-MCNC: NEGATIVE MG/DL
LEUKOCYTE ESTERASE UR QL STRIP: NEGATIVE
MUCOUS THREADS #/AREA URNS LPF: PRESENT /LPF
NITRATE UR QL: NEGATIVE
PH UR STRIP: 5.5 [PH] (ref 4.7–8)
RBC URINE: 3 /HPF
SP GR UR STRIP: 1.02 (ref 1–1.03)
SQUAMOUS EPITHELIAL: 0 /HPF
UROBILINOGEN UR STRIP-MCNC: NORMAL MG/DL
WBC URINE: 1 /HPF

## 2024-01-02 PROCEDURE — 87086 URINE CULTURE/COLONY COUNT: CPT | Performed by: STUDENT IN AN ORGANIZED HEALTH CARE EDUCATION/TRAINING PROGRAM

## 2024-01-02 PROCEDURE — 99213 OFFICE O/P EST LOW 20 MIN: CPT | Performed by: STUDENT IN AN ORGANIZED HEALTH CARE EDUCATION/TRAINING PROGRAM

## 2024-01-02 PROCEDURE — 81001 URINALYSIS AUTO W/SCOPE: CPT | Performed by: STUDENT IN AN ORGANIZED HEALTH CARE EDUCATION/TRAINING PROGRAM

## 2024-01-02 RX ORDER — DEXTROAMPHETAMINE/AMPHETAMINE 15 MG
CAPSULE, EXT RELEASE 24 HR ORAL
COMMUNITY
Start: 2023-09-09

## 2024-01-02 RX ORDER — NITROFURANTOIN 25; 75 MG/1; MG/1
100 CAPSULE ORAL 2 TIMES DAILY
Qty: 10 CAPSULE | Refills: 0 | Status: SHIPPED | OUTPATIENT
Start: 2024-01-02

## 2024-01-02 ASSESSMENT — PATIENT HEALTH QUESTIONNAIRE - PHQ9
SUM OF ALL RESPONSES TO PHQ QUESTIONS 1-9: 0
SUM OF ALL RESPONSES TO PHQ QUESTIONS 1-9: 0
10. IF YOU CHECKED OFF ANY PROBLEMS, HOW DIFFICULT HAVE THESE PROBLEMS MADE IT FOR YOU TO DO YOUR WORK, TAKE CARE OF THINGS AT HOME, OR GET ALONG WITH OTHER PEOPLE: NOT DIFFICULT AT ALL

## 2024-01-02 ASSESSMENT — ANXIETY QUESTIONNAIRES
GAD7 TOTAL SCORE: 2
7. FEELING AFRAID AS IF SOMETHING AWFUL MIGHT HAPPEN: NOT AT ALL
GAD7 TOTAL SCORE: 2
6. BECOMING EASILY ANNOYED OR IRRITABLE: NOT AT ALL
IF YOU CHECKED OFF ANY PROBLEMS ON THIS QUESTIONNAIRE, HOW DIFFICULT HAVE THESE PROBLEMS MADE IT FOR YOU TO DO YOUR WORK, TAKE CARE OF THINGS AT HOME, OR GET ALONG WITH OTHER PEOPLE: NOT DIFFICULT AT ALL
4. TROUBLE RELAXING: NOT AT ALL
5. BEING SO RESTLESS THAT IT IS HARD TO SIT STILL: NOT AT ALL
8. IF YOU CHECKED OFF ANY PROBLEMS, HOW DIFFICULT HAVE THESE MADE IT FOR YOU TO DO YOUR WORK, TAKE CARE OF THINGS AT HOME, OR GET ALONG WITH OTHER PEOPLE?: NOT DIFFICULT AT ALL
1. FEELING NERVOUS, ANXIOUS, OR ON EDGE: NOT AT ALL
7. FEELING AFRAID AS IF SOMETHING AWFUL MIGHT HAPPEN: NOT AT ALL
GAD7 TOTAL SCORE: 2
3. WORRYING TOO MUCH ABOUT DIFFERENT THINGS: SEVERAL DAYS
2. NOT BEING ABLE TO STOP OR CONTROL WORRYING: SEVERAL DAYS

## 2024-01-02 ASSESSMENT — PAIN SCALES - GENERAL: PAINLEVEL: MILD PAIN (2)

## 2024-01-02 NOTE — PROGRESS NOTES
Assessment & Plan     Dysuria  Dysuria, urgency, frequency for the last 2 weeks or so.  Tried to manage conservatively with OTC remedies and did think symptoms would go away.  Has been drinking cranberry juice as well.  Symptoms have not resolved so she decided to come in for evaluation.  Lower urinary tract symptoms and suprapubic tenderness to palpation on exam is present  Will check U/A- clinically most consistent with UTI.  If dirty U/A, will have patient start antibiotic and await culture and sensitivities  U/A did come back with low bacterial burden- will send for culture and have patient start antibiotic until culture is back  - UA Macroscopic with reflex to Microscopic and Culture; Future  - UA Macroscopic with reflex to Microscopic and Culture    Acute cystitis without hematuria  History consistent with acute cystitis.  +suprapubic TTP on exam, no CVA tenderness and no red flag signs or symptoms.    - nitroFURantoin macrocrystal-monohydrate (MACROBID) 100 MG capsule; Take 1 capsule (100 mg) by mouth 2 times daily    Caterina Hdez MD  [facility] - CHARLIE Duarte is a 48 year old, presenting for the following health issues:  Urinary Problem (Possible UTI)        1/2/2024     1:35 PM   Additional Questions   Roomed by Troy Santos LPN   Accompanied by self         1/2/2024     1:35 PM   Patient Reported Additional Medications   Patient reports taking the following new medications none       HPI     Genitourinary - Female  Onset/Duration: over a couple weeks   Description:   Painful urination (Dysuria): YES- burning sensation when urinating and always having the urge to go and fullness dull ache feeling in pelvic floor           Frequency: No  Blood in urine (Hematuria): none that they have seen but have had trace in the past   Delay in urine (Hesitency): No  Intensity: mild, moderate  Progression of Symptoms:  worsening  Accompanying Signs & Symptoms:  Fever/chills: no  fever or chills but waking up in a sweat  Flank pain: No  Nausea and vomiting: No  Vaginal symptoms: itching  Abdominal/Pelvic Pain: YES- as well as in low back  History:   History of frequent UTI s: YES- when they were younger. Has not had them in quite a few years  History of kidney stones: No  Sexually Active: YES  Possibility of pregnancy: No  Precipitating or alleviating factors: None  Therapies tried and outcome: none    Review of Systems   Constitutional, HEENT, cardiovascular, pulmonary, GI, , musculoskeletal, neuro, skin, endocrine and psych systems are negative, except as otherwise noted.      Objective    /80 (BP Location: Left arm, Patient Position: Sitting, Cuff Size: Adult Regular)   Pulse 73   Temp 97.7  F (36.5  C) (Tympanic)   Resp 16   Wt 78.5 kg (173 lb)   SpO2 100%   BMI 27.92 kg/m    Body mass index is 27.92 kg/m .  Physical Exam   GENERAL: healthy, alert and no distress  EYES: Eyes grossly normal to inspection, PERRL and conjunctivae and sclerae normal  HENT: ear canals and TM's normal, nose and mouth without ulcers or lesions  NECK: no adenopathy, no asymmetry, masses, or scars and thyroid normal to palpation  RESP: lungs clear to auscultation - no rales, rhonchi or wheezes  CV: regular rate and rhythm, normal S1 S2, no S3 or S4, no murmur, click or rub, no peripheral edema and peripheral pulses strong  ABDOMEN: soft, nontender, no hepatosplenomegaly, no masses and bowel sounds normal.  Suprapubic tenderness to palpation  MS: no gross musculoskeletal defects noted, no edema  SKIN: no suspicious lesions or rashes  NEURO: Normal strength and tone, mentation intact and speech normal  PSYCH: mentation appears normal, affect normal/bright

## 2024-01-04 ENCOUNTER — TELEPHONE (OUTPATIENT)
Dept: FAMILY MEDICINE | Facility: OTHER | Age: 49
End: 2024-01-04

## 2024-01-04 LAB — BACTERIA UR CULT: NORMAL

## 2024-01-04 NOTE — TELEPHONE ENCOUNTER
11:58 AM    Reason for Call: OVERBOOK    Patient returning a phone call to scheduled an appointment with Dr Hdez and the first opening for Dr Hdez is on 1/23/24 this writer isn't sure when the patient needs to come back and Felicia doesn't want to wait this long to come back in.      Was an appointment offered for this call? No  If yes : Appointment type              Date    Preferred method for responding to this message: Telephone Call  What is your phone number ? [phone]    If we cannot reach you directly, may we leave a detailed response at the number you provided? Yes    Can this message wait until your PCP/provider returns, if unavailable today? No,

## 2024-01-09 ENCOUNTER — OFFICE VISIT (OUTPATIENT)
Dept: FAMILY MEDICINE | Facility: OTHER | Age: 49
End: 2024-01-09
Attending: STUDENT IN AN ORGANIZED HEALTH CARE EDUCATION/TRAINING PROGRAM
Payer: COMMERCIAL

## 2024-01-09 VITALS
WEIGHT: 173.1 LBS | OXYGEN SATURATION: 100 % | HEART RATE: 63 BPM | BODY MASS INDEX: 27.82 KG/M2 | TEMPERATURE: 97.5 F | HEIGHT: 66 IN | DIASTOLIC BLOOD PRESSURE: 80 MMHG | SYSTOLIC BLOOD PRESSURE: 115 MMHG

## 2024-01-09 DIAGNOSIS — R30.0 DYSURIA: Primary | ICD-10-CM

## 2024-01-09 DIAGNOSIS — N89.8 VAGINAL DISCHARGE: ICD-10-CM

## 2024-01-09 DIAGNOSIS — R10.2 PELVIC PAIN IN FEMALE: ICD-10-CM

## 2024-01-09 DIAGNOSIS — N95.1 PERIMENOPAUSE: ICD-10-CM

## 2024-01-09 LAB
ALBUMIN UR-MCNC: NEGATIVE MG/DL
APPEARANCE UR: CLEAR
BACTERIA #/AREA URNS HPF: ABNORMAL /HPF
BILIRUB UR QL STRIP: NEGATIVE
CLUE CELLS: ABNORMAL
COLOR UR AUTO: ABNORMAL
GLUCOSE UR STRIP-MCNC: NEGATIVE MG/DL
HGB UR QL STRIP: ABNORMAL
HYALINE CASTS: 1 /LPF
KETONES UR STRIP-MCNC: NEGATIVE MG/DL
LEUKOCYTE ESTERASE UR QL STRIP: NEGATIVE
MUCOUS THREADS #/AREA URNS LPF: PRESENT /LPF
NITRATE UR QL: NEGATIVE
PH UR STRIP: 6 [PH] (ref 4.7–8)
RBC URINE: 3 /HPF
SP GR UR STRIP: 1.02 (ref 1–1.03)
SQUAMOUS EPITHELIAL: 1 /HPF
TRICHOMONAS, WET PREP: ABNORMAL
UROBILINOGEN UR STRIP-MCNC: NORMAL MG/DL
WBC URINE: <1 /HPF
WBC'S/HIGH POWER FIELD, WET PREP: ABNORMAL
YEAST, WET PREP: PRESENT

## 2024-01-09 PROCEDURE — 87210 SMEAR WET MOUNT SALINE/INK: CPT | Performed by: STUDENT IN AN ORGANIZED HEALTH CARE EDUCATION/TRAINING PROGRAM

## 2024-01-09 PROCEDURE — 99214 OFFICE O/P EST MOD 30 MIN: CPT | Performed by: STUDENT IN AN ORGANIZED HEALTH CARE EDUCATION/TRAINING PROGRAM

## 2024-01-09 PROCEDURE — 81001 URINALYSIS AUTO W/SCOPE: CPT | Performed by: STUDENT IN AN ORGANIZED HEALTH CARE EDUCATION/TRAINING PROGRAM

## 2024-01-09 RX ORDER — FLUCONAZOLE 150 MG/1
150 TABLET ORAL ONCE
Qty: 2 TABLET | Refills: 0 | Status: CANCELLED | OUTPATIENT
Start: 2024-01-09 | End: 2024-01-09

## 2024-01-09 ASSESSMENT — PAIN SCALES - GENERAL: PAINLEVEL: NO PAIN (0)

## 2024-01-09 NOTE — PROGRESS NOTES
Every day waking up drenched and shivering.  Wants to check FSH.  Had ablation done 4 years ago.  Doesn't know.    After the ablation not having any.  Last 6 months, has been spotting. Can tell she is cycling and cramping.  Has been monitoring cycles with peggy.  Has been spot on.  Ultrasound.      Improvement by about 50%- 2.5 days.    Has not used any creams or gels.  No douching.    Burning is there, not severe.  Uncomfortable.    Pressure and fullness in pelvic area and runs around the low back.  She tells me she is regular but sensitive to foods.  Possible IBS syndrome?  Cycle will have an effect on this  Mom had breast cancer and MGM had pancreatic cancer.

## 2024-01-09 NOTE — PROGRESS NOTES
Urethral syndrome?constant irritation, more mild?  Urethral stenosis or hormonall imbalance.    Slight burning when she pees.  Itching sometimes.    Lower back pain since prior.

## 2024-01-10 ENCOUNTER — TELEPHONE (OUTPATIENT)
Dept: FAMILY MEDICINE | Facility: OTHER | Age: 49
End: 2024-01-10

## 2024-01-10 DIAGNOSIS — B37.31 YEAST INFECTION OF THE VAGINA: Primary | ICD-10-CM

## 2024-01-10 NOTE — TELEPHONE ENCOUNTER
12:15 PM    Reason for Call: Phone Call    Description: pt is calling about test results, she stated that she had labs done yesterday and received them on [employer] but does not know what she is looking at. Pt saw Dr. Hdez 01/09    Was an appointment offered for this call? No  If yes : Appointment type              Date    Preferred method for responding to this message: Telephone Call  What is your phone number ? [phone]    If we cannot reach you directly, may we leave a detailed response at the number you provided? Yes    Can this message wait until your PCP/provider returns, if available today? Not applicable    Bee Sher

## 2024-01-11 RX ORDER — FLUCONAZOLE 150 MG/1
TABLET ORAL
Qty: 2 TABLET | Refills: 0 | Status: SHIPPED | OUTPATIENT
Start: 2024-01-11

## 2024-01-11 NOTE — TELEPHONE ENCOUNTER
LVM for patient to call back if still has any questions after viewing results on My chart      Pended med request for yeast treatment to Provider to review

## 2024-02-18 ENCOUNTER — HEALTH MAINTENANCE LETTER (OUTPATIENT)
Age: 49
End: 2024-02-18

## 2024-02-23 ENCOUNTER — ANCILLARY PROCEDURE (OUTPATIENT)
Dept: MAMMOGRAPHY | Facility: OTHER | Age: 49
End: 2024-02-23
Attending: FAMILY MEDICINE
Payer: COMMERCIAL

## 2024-02-23 ENCOUNTER — TELEPHONE (OUTPATIENT)
Dept: MAMMOGRAPHY | Facility: OTHER | Age: 49
End: 2024-02-23

## 2024-02-23 DIAGNOSIS — Z12.31 VISIT FOR SCREENING MAMMOGRAM: ICD-10-CM

## 2024-02-23 PROCEDURE — 77063 BREAST TOMOSYNTHESIS BI: CPT | Mod: TC | Performed by: RADIOLOGY

## 2024-02-23 PROCEDURE — 77067 SCR MAMMO BI INCL CAD: CPT | Mod: TC | Performed by: RADIOLOGY

## 2024-06-04 NOTE — TELEPHONE ENCOUNTER
Patient seen in Maternal Fetal Medicine clinic today. Please see full note in under imaging tab of patient chart in Epic (Viewpoint report).    Courtney Germain MD     Prozac  Last Written Prescription Date:  6.30.2020  Last Fill Quantity: 90,   # refills: 1  Last Office Visit: 6.30.2020  Future Office visit:       Routing refill request to provider for review/approval because:  Pt request for refill.      Pt is also wondering if there is other medications other than the above. She still has anxiety.    Phone visit to discuss? Please advise.    Call pt back at [phone]      Sima Garcia RN

## 2024-09-24 ENCOUNTER — MYC REFILL (OUTPATIENT)
Dept: FAMILY MEDICINE | Facility: OTHER | Age: 49
End: 2024-09-24

## 2024-09-24 DIAGNOSIS — F41.1 GAD (GENERALIZED ANXIETY DISORDER): ICD-10-CM

## 2024-09-24 NOTE — TELEPHONE ENCOUNTER
FLUoxetine (PROZAC) 40 MG capsule       Last Written Prescription Date:  12/04/2023  Last Fill Quantity: 90,   # refills: 2  Last Office Visit: 01/09/2024   Future Office visit:     Routing refill request to provider for review/approval because:    SSRIs Protocol [id number]/24/2024 04:04 PM   Protocol Details JASON-7 score of less than 5 in past 6 months.        Kimberly Boecker, RN

## 2024-09-25 ENCOUNTER — MYC REFILL (OUTPATIENT)
Dept: FAMILY MEDICINE | Facility: OTHER | Age: 49
End: 2024-09-25

## 2024-09-25 DIAGNOSIS — J30.2 SEASONAL ALLERGIC RHINITIS, UNSPECIFIED TRIGGER: ICD-10-CM

## 2024-09-25 RX ORDER — FLUOXETINE 40 MG/1
40 CAPSULE ORAL DAILY
Qty: 90 CAPSULE | Refills: 2 | Status: SHIPPED | OUTPATIENT
Start: 2024-09-25

## 2024-09-25 RX ORDER — CETIRIZINE HYDROCHLORIDE 10 MG/1
10 TABLET ORAL DAILY
Qty: 90 TABLET | Refills: 3 | Status: SHIPPED | OUTPATIENT
Start: 2024-09-25

## 2024-11-01 NOTE — PROGRESS NOTES
Anabella Albrecht  is being evaluated via a billable video visit.      How would you like to obtain your AVS? [place]  For the video visit, send the invitation by: Text to cell phone: [phone]  Will anyone else be joining your video visit? No         Temples.../Clavicles.../Shoulders.../Calf...

## 2025-01-07 ASSESSMENT — PATIENT HEALTH QUESTIONNAIRE - PHQ9
SUM OF ALL RESPONSES TO PHQ QUESTIONS 1-9: 2
10. IF YOU CHECKED OFF ANY PROBLEMS, HOW DIFFICULT HAVE THESE PROBLEMS MADE IT FOR YOU TO DO YOUR WORK, TAKE CARE OF THINGS AT HOME, OR GET ALONG WITH OTHER PEOPLE: SOMEWHAT DIFFICULT
SUM OF ALL RESPONSES TO PHQ QUESTIONS 1-9: 2

## 2025-01-08 ENCOUNTER — OFFICE VISIT (OUTPATIENT)
Dept: FAMILY MEDICINE | Facility: OTHER | Age: 50
End: 2025-01-08
Attending: STUDENT IN AN ORGANIZED HEALTH CARE EDUCATION/TRAINING PROGRAM
Payer: COMMERCIAL

## 2025-01-08 VITALS
DIASTOLIC BLOOD PRESSURE: 86 MMHG | HEIGHT: 66 IN | RESPIRATION RATE: 16 BRPM | OXYGEN SATURATION: 97 % | BODY MASS INDEX: 28.78 KG/M2 | HEART RATE: 77 BPM | TEMPERATURE: 98.2 F | SYSTOLIC BLOOD PRESSURE: 128 MMHG | WEIGHT: 179.06 LBS

## 2025-01-08 DIAGNOSIS — M41.20 OTHER IDIOPATHIC SCOLIOSIS, UNSPECIFIED SPINAL REGION: ICD-10-CM

## 2025-01-08 DIAGNOSIS — Z76.89 ENCOUNTER TO ESTABLISH CARE: Primary | ICD-10-CM

## 2025-01-08 DIAGNOSIS — Z12.31 ENCOUNTER FOR SCREENING MAMMOGRAM FOR BREAST CANCER: ICD-10-CM

## 2025-01-08 DIAGNOSIS — F41.1 GAD (GENERALIZED ANXIETY DISORDER): ICD-10-CM

## 2025-01-08 DIAGNOSIS — N95.1 VASOMOTOR SYMPTOMS DUE TO MENOPAUSE: ICD-10-CM

## 2025-01-08 RX ORDER — PROGESTERONE 100 MG/1
100 CAPSULE ORAL DAILY
Qty: 30 CAPSULE | Refills: 1 | Status: SHIPPED | OUTPATIENT
Start: 2025-01-08

## 2025-01-08 RX ORDER — ESTRADIOL 0.5 MG/1
0.5 TABLET ORAL DAILY
Qty: 30 TABLET | Refills: 1 | Status: SHIPPED | OUTPATIENT
Start: 2025-01-08

## 2025-01-08 RX ORDER — FLUOXETINE 40 MG/1
40 CAPSULE ORAL DAILY
Qty: 90 CAPSULE | Refills: 2 | Status: SHIPPED | OUTPATIENT
Start: 2025-01-08 | End: 2025-01-09

## 2025-01-08 ASSESSMENT — PAIN SCALES - GENERAL: PAINLEVEL_OUTOF10: NO PAIN (0)

## 2025-01-08 NOTE — PROGRESS NOTES
"  Assessment & Plan     Encounter to establish care  Felicia is a very pleasant 49 year old presenting to clinic to [facility]    JASON (generalized anxiety disorder)  Stable    Other idiopathic scoliosis, unspecified spinal region  Stable    Vasomotor symptoms due to menopause  Very distressing hot flashes.  She would like to try HRT  We did discuss the risks and benefits extensively  Will start with low dose oral regimen along with bio-identical prometrium for uterine protection  RTC in 4 weeks for follow-up  Will need to discuss switching to transdermal route as this may be safer option  - estradiol (ESTRACE) 0.5 MG tablet; Take 1 tablet (0.5 mg) by mouth daily.  - progesterone (PROMETRIUM) 100 MG capsule; Take 1 capsule (100 mg) by mouth daily.    Encounter for screening mammogram for breast cancer  Due  - MA Screen Bilateral w/Obed; Future          BMI  Estimated body mass index is 28.9 kg/m  as calculated from the following:    Height as of this encounter: 1.676 m (5' 6\").    Weight as of this encounter: 81.2 kg (179 lb 1 oz).         No follow-ups on file.    Subjective   Feliica is a 49 year old, presenting for the following health issues:  Establish Care    Has scoliosis- goes to chiropractor all the time and this helps.            1/8/2025    10:33 AM   Additional Questions   Roomed by Danya Andrews   Accompanied by None         1/8/2025    10:33 AM   Patient Reported Additional Medications   Patient reports taking the following new medications None     History of Present Illness       Reason for visit:  New patient   She is taking medications regularly.     [facility]    Would like to discuss hormonal stuff   Feels like she is in perimenopause  A few years ago she had an ablation, started to get period again now.  It is not as heavy but it did start to come back  Feels very hormonal   Breasts feel like she is going to nurse  Wakes up soaking wet  Has tried estroven in the past and it didn't work.  " "  Still cycling every month but the cycling is not regular.    [facility] here.            Review of Systems  Constitutional, HEENT, cardiovascular, pulmonary, GI, , musculoskeletal, neuro, skin, endocrine and psych systems are negative, except as otherwise noted.      Objective    /86 (BP Location: Right arm, Patient Position: Sitting, Cuff Size: Adult Regular)   Pulse 77   Temp 98.2  F (36.8  C) (Tympanic)   Resp 16   Ht 1.676 m (5' 6\")   Wt 81.2 kg (179 lb 1 oz)   SpO2 97%   BMI 28.90 kg/m    Body mass index is 28.9 kg/m .  Physical Exam   GENERAL: alert and no distress  EYES: Eyes grossly normal to inspection, PERRL and conjunctivae and sclerae normal  HENT: ear canals and TM's normal, nose and mouth without ulcers or lesions  NECK: no adenopathy, no asymmetry, masses, or scars  RESP: lungs clear to auscultation - no rales, rhonchi or wheezes  CV: regular rate and rhythm, normal S1 S2, no S3 or S4, no murmur, click or rub, no peripheral edema  ABDOMEN: soft, nontender, no hepatosplenomegaly, no masses and bowel sounds normal  MS: no gross musculoskeletal defects noted, no edema  SKIN: no suspicious lesions or rashes  NEURO: Normal strength and tone, mentation intact and speech normal  PSYCH: mentation appears normal, affect normal/bright          Signed Electronically by: Caterina Hdez MD    "

## 2025-01-09 ENCOUNTER — MYC REFILL (OUTPATIENT)
Dept: FAMILY MEDICINE | Facility: OTHER | Age: 50
End: 2025-01-09

## 2025-01-09 DIAGNOSIS — F41.1 GAD (GENERALIZED ANXIETY DISORDER): ICD-10-CM

## 2025-01-09 RX ORDER — FLUOXETINE 40 MG/1
40 CAPSULE ORAL DAILY
Qty: 90 CAPSULE | Refills: 0 | Status: SHIPPED | OUTPATIENT
Start: 2025-01-09

## 2025-01-09 NOTE — TELEPHONE ENCOUNTER
Fluoxetine (Prozac) 40 MG capsule     Last Written Prescription Date:  01/08/2025  Last Fill Quantity: 90,   # refills: 2  Last Office Visit: 01/08/2025  Future Office visit:       Routing refill request to provider for review/approval because:

## 2025-01-26 DIAGNOSIS — N95.1 PERIMENOPAUSAL VASOMOTOR SYMPTOMS: Primary | ICD-10-CM

## 2025-02-24 ENCOUNTER — ANCILLARY PROCEDURE (OUTPATIENT)
Dept: MAMMOGRAPHY | Facility: OTHER | Age: 50
End: 2025-02-24
Attending: STUDENT IN AN ORGANIZED HEALTH CARE EDUCATION/TRAINING PROGRAM
Payer: COMMERCIAL

## 2025-02-24 ENCOUNTER — TELEPHONE (OUTPATIENT)
Dept: MAMMOGRAPHY | Facility: OTHER | Age: 50
End: 2025-02-24

## 2025-02-24 DIAGNOSIS — Z12.31 ENCOUNTER FOR SCREENING MAMMOGRAM FOR BREAST CANCER: ICD-10-CM

## 2025-02-24 PROCEDURE — 77067 SCR MAMMO BI INCL CAD: CPT | Mod: TC | Performed by: RADIOLOGY

## 2025-02-24 PROCEDURE — 77063 BREAST TOMOSYNTHESIS BI: CPT | Mod: TC | Performed by: RADIOLOGY

## 2025-04-01 SDOH — HEALTH STABILITY: PHYSICAL HEALTH: ON AVERAGE, HOW MANY MINUTES DO YOU ENGAGE IN EXERCISE AT THIS LEVEL?: 60 MIN

## 2025-04-01 SDOH — HEALTH STABILITY: PHYSICAL HEALTH: ON AVERAGE, HOW MANY DAYS PER WEEK DO YOU ENGAGE IN MODERATE TO STRENUOUS EXERCISE (LIKE A BRISK WALK)?: 3 DAYS

## 2025-04-01 ASSESSMENT — SOCIAL DETERMINANTS OF HEALTH (SDOH): HOW OFTEN DO YOU GET TOGETHER WITH FRIENDS OR RELATIVES?: MORE THAN THREE TIMES A WEEK

## 2025-04-02 ENCOUNTER — OFFICE VISIT (OUTPATIENT)
Dept: FAMILY MEDICINE | Facility: OTHER | Age: 50
End: 2025-04-02
Attending: STUDENT IN AN ORGANIZED HEALTH CARE EDUCATION/TRAINING PROGRAM
Payer: COMMERCIAL

## 2025-04-02 ENCOUNTER — TELEPHONE (OUTPATIENT)
Dept: FAMILY MEDICINE | Facility: OTHER | Age: 50
End: 2025-04-02

## 2025-04-02 VITALS
SYSTOLIC BLOOD PRESSURE: 102 MMHG | DIASTOLIC BLOOD PRESSURE: 74 MMHG | OXYGEN SATURATION: 97 % | BODY MASS INDEX: 29.02 KG/M2 | HEIGHT: 67 IN | WEIGHT: 184.9 LBS | TEMPERATURE: 98.3 F | HEART RATE: 89 BPM

## 2025-04-02 DIAGNOSIS — R93.89 ENDOMETRIAL THICKENING ON ULTRASOUND: ICD-10-CM

## 2025-04-02 DIAGNOSIS — N95.1 PERIMENOPAUSAL VASOMOTOR SYMPTOMS: ICD-10-CM

## 2025-04-02 DIAGNOSIS — Z01.419 WELL WOMAN EXAM: Primary | ICD-10-CM

## 2025-04-02 DIAGNOSIS — F41.1 GAD (GENERALIZED ANXIETY DISORDER): ICD-10-CM

## 2025-04-02 RX ORDER — PROGESTERONE 100 MG/1
100 CAPSULE ORAL DAILY
COMMUNITY
Start: 2025-02-03 | End: 2025-04-02

## 2025-04-02 RX ORDER — ESTRADIOL 0.5 MG/1
0.5 TABLET ORAL DAILY
Status: CANCELLED | OUTPATIENT
Start: 2025-04-02

## 2025-04-02 RX ORDER — ESTRADIOL 1 MG/1
1 TABLET ORAL DAILY
Qty: 90 TABLET | Refills: 1 | Status: SHIPPED | OUTPATIENT
Start: 2025-04-02

## 2025-04-02 RX ORDER — PROGESTERONE 100 MG/1
100 CAPSULE ORAL DAILY
Qty: 90 CAPSULE | Refills: 1 | Status: SHIPPED | OUTPATIENT
Start: 2025-04-02

## 2025-04-02 RX ORDER — FLUOXETINE HYDROCHLORIDE 40 MG/1
40 CAPSULE ORAL DAILY
Qty: 90 CAPSULE | Refills: 0 | Status: CANCELLED | OUTPATIENT
Start: 2025-04-02

## 2025-04-02 RX ORDER — ESTRADIOL 0.5 MG/1
0.5 TABLET ORAL DAILY
COMMUNITY
Start: 2025-02-03

## 2025-04-02 ASSESSMENT — PAIN SCALES - GENERAL: PAINLEVEL_OUTOF10: NO PAIN (0)
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
## 2025-04-03 ENCOUNTER — MYC MEDICAL ADVICE (OUTPATIENT)
Dept: FAMILY MEDICINE | Facility: OTHER | Age: 50
End: 2025-04-03

## 2025-04-04 ENCOUNTER — LAB (OUTPATIENT)
Dept: LAB | Facility: OTHER | Age: 50
End: 2025-04-04
Attending: STUDENT IN AN ORGANIZED HEALTH CARE EDUCATION/TRAINING PROGRAM
Payer: COMMERCIAL

## 2025-04-04 ENCOUNTER — HOSPITAL ENCOUNTER (OUTPATIENT)
Dept: ULTRASOUND IMAGING | Facility: HOSPITAL | Age: 50
Discharge: HOME | End: 2025-04-04
Attending: STUDENT IN AN ORGANIZED HEALTH CARE EDUCATION/TRAINING PROGRAM
Payer: COMMERCIAL

## 2025-04-04 DIAGNOSIS — Z01.419 WELL WOMAN EXAM: ICD-10-CM

## 2025-04-04 DIAGNOSIS — F41.1 GAD (GENERALIZED ANXIETY DISORDER): ICD-10-CM

## 2025-04-04 DIAGNOSIS — N93.9 ABNORMAL UTERINE BLEEDING: ICD-10-CM

## 2025-04-04 LAB
ALBUMIN SERPL BCG-MCNC: 4.1 G/DL (ref 3.5–5.2)
ALP SERPL-CCNC: 54 U/L (ref 40–150)
ALT SERPL W P-5'-P-CCNC: 17 U/L (ref 0–50)
ANION GAP SERPL CALCULATED.3IONS-SCNC: 10 MMOL/L (ref 7–15)
AST SERPL W P-5'-P-CCNC: 27 U/L (ref 0–45)
BASOPHILS # BLD AUTO: 0.1 10E3/UL (ref 0–0.2)
BASOPHILS NFR BLD AUTO: 1 %
BILIRUB SERPL-MCNC: 0.5 MG/DL
BUN SERPL-MCNC: 15 MG/DL (ref 6–20)
CALCIUM SERPL-MCNC: 9 MG/DL (ref 8.8–10.4)
CHLORIDE SERPL-SCNC: 103 MMOL/L (ref 98–107)
CHOLEST SERPL-MCNC: 194 MG/DL
CREAT SERPL-MCNC: 1.02 MG/DL (ref 0.51–0.95)
EGFRCR SERPLBLD CKD-EPI 2021: 68 ML/MIN/1.73M2
EOSINOPHIL # BLD AUTO: 0.2 10E3/UL (ref 0–0.7)
EOSINOPHIL NFR BLD AUTO: 3 %
ERYTHROCYTE [DISTWIDTH] IN BLOOD BY AUTOMATED COUNT: 12.7 % (ref 10–15)
EST. AVERAGE GLUCOSE BLD GHB EST-MCNC: 105 MG/DL
FASTING STATUS PATIENT QL REPORTED: YES
FASTING STATUS PATIENT QL REPORTED: YES
GLUCOSE SERPL-MCNC: 101 MG/DL (ref 70–99)
HBA1C MFR BLD: 5.3 %
HCO3 SERPL-SCNC: 25 MMOL/L (ref 22–29)
HCT VFR BLD AUTO: 39.5 % (ref 35–47)
HDLC SERPL-MCNC: 63 MG/DL
HGB BLD-MCNC: 13.3 G/DL (ref 11.7–15.7)
IMM GRANULOCYTES # BLD: 0 10E3/UL
IMM GRANULOCYTES NFR BLD: 0 %
LDLC SERPL CALC-MCNC: 106 MG/DL
LYMPHOCYTES # BLD AUTO: 2.2 10E3/UL (ref 0.8–5.3)
LYMPHOCYTES NFR BLD AUTO: 34 %
MCH RBC QN AUTO: 29.9 PG (ref 26.5–33)
MCHC RBC AUTO-ENTMCNC: 33.7 G/DL (ref 31.5–36.5)
MCV RBC AUTO: 89 FL (ref 78–100)
MONOCYTES # BLD AUTO: 0.3 10E3/UL (ref 0–1.3)
MONOCYTES NFR BLD AUTO: 5 %
NEUTROPHILS # BLD AUTO: 3.6 10E3/UL (ref 1.6–8.3)
NEUTROPHILS NFR BLD AUTO: 56 %
NONHDLC SERPL-MCNC: 131 MG/DL
NRBC # BLD AUTO: 0 10E3/UL
NRBC BLD AUTO-RTO: 0 /100
PLATELET # BLD AUTO: 319 10E3/UL (ref 150–450)
POTASSIUM SERPL-SCNC: 4.7 MMOL/L (ref 3.4–5.3)
PROT SERPL-MCNC: 7.1 G/DL (ref 6.4–8.3)
RBC # BLD AUTO: 4.45 10E6/UL (ref 3.8–5.2)
SODIUM SERPL-SCNC: 138 MMOL/L (ref 135–145)
TRIGL SERPL-MCNC: 127 MG/DL
TSH SERPL DL<=0.005 MIU/L-ACNC: 1.41 UIU/ML (ref 0.3–4.2)
WBC # BLD AUTO: 6.5 10E3/UL (ref 4–11)

## 2025-04-04 PROCEDURE — 36415 COLL VENOUS BLD VENIPUNCTURE: CPT

## 2025-04-04 PROCEDURE — 80050 GENERAL HEALTH PANEL: CPT

## 2025-04-04 PROCEDURE — 80061 LIPID PANEL: CPT

## 2025-04-04 PROCEDURE — 76830 TRANSVAGINAL US NON-OB: CPT

## 2025-04-04 PROCEDURE — 83036 HEMOGLOBIN GLYCOSYLATED A1C: CPT

## 2025-04-04 PROCEDURE — 76856 US EXAM PELVIC COMPLETE: CPT

## 2025-04-07 LAB
HPV HR 12 DNA CVX QL NAA+PROBE: NEGATIVE
HPV16 DNA CVX QL NAA+PROBE: NEGATIVE
HPV18 DNA CVX QL NAA+PROBE: NEGATIVE
HUMAN PAPILLOMA VIRUS FINAL DIAGNOSIS: NORMAL

## 2025-04-10 LAB
BKR AP ASSOCIATED HPV REPORT: NORMAL
BKR LAB AP GYN ADEQUACY: NORMAL
BKR LAB AP GYN INTERPRETATION: NORMAL
BKR LAB AP PREVIOUS ABNORMAL: NORMAL
PATH REPORT.COMMENTS IMP SPEC: NORMAL
PATH REPORT.COMMENTS IMP SPEC: NORMAL
PATH REPORT.RELEVANT HX SPEC: NORMAL

## 2025-04-25 ENCOUNTER — OFFICE VISIT (OUTPATIENT)
Dept: OBGYN | Facility: OTHER | Age: 50
End: 2025-04-25
Attending: OBSTETRICS & GYNECOLOGY
Payer: COMMERCIAL

## 2025-04-25 ENCOUNTER — PREP FOR PROCEDURE (OUTPATIENT)
Dept: OBGYN | Facility: OTHER | Age: 50
End: 2025-04-25

## 2025-04-25 VITALS
HEART RATE: 89 BPM | WEIGHT: 185 LBS | OXYGEN SATURATION: 99 % | HEIGHT: 66 IN | DIASTOLIC BLOOD PRESSURE: 72 MMHG | BODY MASS INDEX: 29.73 KG/M2 | SYSTOLIC BLOOD PRESSURE: 108 MMHG

## 2025-04-25 DIAGNOSIS — N80.9 ENDOMETRIOSIS: ICD-10-CM

## 2025-04-25 DIAGNOSIS — R10.2 PELVIC PAIN IN FEMALE: ICD-10-CM

## 2025-04-25 DIAGNOSIS — N94.6 DYSMENORRHEA: ICD-10-CM

## 2025-04-25 DIAGNOSIS — D25.9 UTERINE LEIOMYOMA, UNSPECIFIED LOCATION: ICD-10-CM

## 2025-04-25 DIAGNOSIS — N93.9 ABNORMAL UTERINE BLEEDING (AUB): Primary | ICD-10-CM

## 2025-04-25 DIAGNOSIS — R93.89 THICKENED ENDOMETRIUM: ICD-10-CM

## 2025-04-25 ASSESSMENT — PAIN SCALES - GENERAL: PAINLEVEL_OUTOF10: NO PAIN (0)

## 2025-04-25 NOTE — PROGRESS NOTES
Patient provide preoperative education, surgical packet discussed, and two bottles of soap (Hibiclens) provided to the patient. Patient has preoperative appointment with  . No further questions or concerns at completion of education.   LAZARO HANSEN RN     No
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
## 2025-05-08 ENCOUNTER — ANESTHESIA EVENT (OUTPATIENT)
Dept: SURGERY | Facility: HOSPITAL | Age: 50
End: 2025-05-08
Payer: COMMERCIAL
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
## 2025-05-14 ENCOUNTER — ANESTHESIA (OUTPATIENT)
Dept: SURGERY | Facility: HOSPITAL | Age: 50
End: 2025-05-14
Payer: COMMERCIAL

## 2025-05-14 ENCOUNTER — HOSPITAL ENCOUNTER (OUTPATIENT)
Facility: HOSPITAL | Age: 50
Discharge: HOME | End: 2025-05-15
Attending: OBSTETRICS & GYNECOLOGY | Admitting: OBSTETRICS & GYNECOLOGY
Payer: COMMERCIAL

## 2025-05-14 ENCOUNTER — LAB (OUTPATIENT)
Dept: LAB | Facility: HOSPITAL | Age: 50
End: 2025-05-14
Attending: OBSTETRICS & GYNECOLOGY
Payer: COMMERCIAL

## 2025-05-14 DIAGNOSIS — Z90.710 S/P HYSTERECTOMY: Primary | ICD-10-CM

## 2025-05-14 LAB
ABO + RH BLD: NORMAL
BLD GP AB SCN SERPL QL: NEGATIVE
GLUCOSE BLDC GLUCOMTR-MCNC: 92 MG/DL (ref 70–99)
HCG UR QL: NEGATIVE
SPECIMEN EXP DATE BLD: NORMAL

## 2025-05-14 PROCEDURE — 250N000011 HC RX IP 250 OP 636: Performed by: NURSE ANESTHETIST, CERTIFIED REGISTERED

## 2025-05-14 PROCEDURE — 250N000011 HC RX IP 250 OP 636: Mod: JZ | Performed by: NURSE PRACTITIONER

## 2025-05-14 PROCEDURE — 58552 LAPARO-VAG HYST INCL T/O: CPT | Performed by: OBSTETRICS & GYNECOLOGY

## 2025-05-14 PROCEDURE — 710N000010 HC RECOVERY PHASE 1, LEVEL 2, PER MIN: Performed by: OBSTETRICS & GYNECOLOGY

## 2025-05-14 PROCEDURE — 999N000157 HC STATISTIC RCP TIME EA 10 MIN

## 2025-05-14 PROCEDURE — 88307 TISSUE EXAM BY PATHOLOGIST: CPT | Mod: TC | Performed by: OBSTETRICS & GYNECOLOGY

## 2025-05-14 PROCEDURE — 81025 URINE PREGNANCY TEST: CPT | Performed by: OBSTETRICS & GYNECOLOGY

## 2025-05-14 PROCEDURE — 258N000003 HC RX IP 258 OP 636: Performed by: NURSE PRACTITIONER

## 2025-05-14 PROCEDURE — 250N000011 HC RX IP 250 OP 636: Mod: JZ | Performed by: OBSTETRICS & GYNECOLOGY

## 2025-05-14 PROCEDURE — 250N000009 HC RX 250: Performed by: NURSE ANESTHETIST, CERTIFIED REGISTERED

## 2025-05-14 PROCEDURE — 36415 COLL VENOUS BLD VENIPUNCTURE: CPT | Performed by: OBSTETRICS & GYNECOLOGY

## 2025-05-14 PROCEDURE — 96375 TX/PRO/DX INJ NEW DRUG ADDON: CPT | Mod: XU

## 2025-05-14 PROCEDURE — 999N000141 HC STATISTIC PRE-PROCEDURE NURSING ASSESSMENT: Performed by: OBSTETRICS & GYNECOLOGY

## 2025-05-14 PROCEDURE — 272N000001 HC OR GENERAL SUPPLY STERILE: Performed by: OBSTETRICS & GYNECOLOGY

## 2025-05-14 PROCEDURE — 88307 TISSUE EXAM BY PATHOLOGIST: CPT | Mod: 26 | Performed by: PATHOLOGY

## 2025-05-14 PROCEDURE — 86900 BLOOD TYPING SEROLOGIC ABO: CPT | Performed by: OBSTETRICS & GYNECOLOGY

## 2025-05-14 PROCEDURE — 82962 GLUCOSE BLOOD TEST: CPT

## 2025-05-14 PROCEDURE — 370N000017 HC ANESTHESIA TECHNICAL FEE, PER MIN: Performed by: OBSTETRICS & GYNECOLOGY

## 2025-05-14 PROCEDURE — 360N000077 HC SURGERY LEVEL 4, PER MIN: Performed by: OBSTETRICS & GYNECOLOGY

## 2025-05-14 PROCEDURE — 96374 THER/PROPH/DIAG INJ IV PUSH: CPT | Mod: XU

## 2025-05-14 PROCEDURE — 250N000013 HC RX MED GY IP 250 OP 250 PS 637: Performed by: OBSTETRICS & GYNECOLOGY

## 2025-05-14 PROCEDURE — 250N000024 HC ISOFLURANE, PER MIN: Performed by: OBSTETRICS & GYNECOLOGY

## 2025-05-14 RX ORDER — SODIUM CHLORIDE, SODIUM LACTATE, POTASSIUM CHLORIDE, CALCIUM CHLORIDE 600; 310; 30; 20 MG/100ML; MG/100ML; MG/100ML; MG/100ML
INJECTION, SOLUTION INTRAVENOUS CONTINUOUS
Status: DISCONTINUED | OUTPATIENT
Start: 2025-05-14 | End: 2025-05-14 | Stop reason: HOSPADM

## 2025-05-14 RX ORDER — ACETAMINOPHEN 325 MG/1
650 TABLET ORAL EVERY 6 HOURS
Status: DISCONTINUED | OUTPATIENT
Start: 2025-05-17 | End: 2025-05-15 | Stop reason: HOSPADM

## 2025-05-14 RX ORDER — FENTANYL CITRATE 50 UG/ML
25 INJECTION, SOLUTION INTRAMUSCULAR; INTRAVENOUS EVERY 5 MIN PRN
Status: DISCONTINUED | OUTPATIENT
Start: 2025-05-14 | End: 2025-05-14 | Stop reason: HOSPADM

## 2025-05-14 RX ORDER — FENTANYL CITRATE 50 UG/ML
INJECTION, SOLUTION INTRAMUSCULAR; INTRAVENOUS PRN
Status: DISCONTINUED | OUTPATIENT
Start: 2025-05-14 | End: 2025-05-14

## 2025-05-14 RX ORDER — ONDANSETRON 4 MG/1
4 TABLET, ORALLY DISINTEGRATING ORAL EVERY 30 MIN PRN
Status: DISCONTINUED | OUTPATIENT
Start: 2025-05-14 | End: 2025-05-14 | Stop reason: HOSPADM

## 2025-05-14 RX ORDER — KETOROLAC TROMETHAMINE 30 MG/ML
INJECTION, SOLUTION INTRAMUSCULAR; INTRAVENOUS
Status: COMPLETED
Start: 2025-05-14 | End: 2025-05-14

## 2025-05-14 RX ORDER — OXYCODONE HYDROCHLORIDE 5 MG/1
5 TABLET ORAL EVERY 4 HOURS PRN
Status: DISCONTINUED | OUTPATIENT
Start: 2025-05-14 | End: 2025-05-15 | Stop reason: HOSPADM

## 2025-05-14 RX ORDER — ACETAMINOPHEN 325 MG/1
975 TABLET ORAL EVERY 6 HOURS
Status: DISCONTINUED | OUTPATIENT
Start: 2025-05-14 | End: 2025-05-15 | Stop reason: HOSPADM

## 2025-05-14 RX ORDER — PROPOFOL 10 MG/ML
INJECTION, EMULSION INTRAVENOUS CONTINUOUS PRN
Status: DISCONTINUED | OUTPATIENT
Start: 2025-05-14 | End: 2025-05-14

## 2025-05-14 RX ORDER — ONDANSETRON 4 MG/1
4 TABLET, ORALLY DISINTEGRATING ORAL EVERY 6 HOURS PRN
Status: DISCONTINUED | OUTPATIENT
Start: 2025-05-14 | End: 2025-05-15 | Stop reason: HOSPADM

## 2025-05-14 RX ORDER — LIDOCAINE 40 MG/G
CREAM TOPICAL
Status: DISCONTINUED | OUTPATIENT
Start: 2025-05-14 | End: 2025-05-14 | Stop reason: HOSPADM

## 2025-05-14 RX ORDER — GLYCOPYRROLATE 0.2 MG/ML
INJECTION, SOLUTION INTRAMUSCULAR; INTRAVENOUS PRN
Status: DISCONTINUED | OUTPATIENT
Start: 2025-05-14 | End: 2025-05-14

## 2025-05-14 RX ORDER — KETAMINE HYDROCHLORIDE 10 MG/ML
INJECTION INTRAMUSCULAR; INTRAVENOUS PRN
Status: DISCONTINUED | OUTPATIENT
Start: 2025-05-14 | End: 2025-05-14

## 2025-05-14 RX ORDER — AMOXICILLIN 250 MG
1 CAPSULE ORAL 2 TIMES DAILY
Status: DISCONTINUED | OUTPATIENT
Start: 2025-05-14 | End: 2025-05-15 | Stop reason: HOSPADM

## 2025-05-14 RX ORDER — IBUPROFEN 800 MG/1
800 TABLET, FILM COATED ORAL EVERY 6 HOURS
Status: DISCONTINUED | OUTPATIENT
Start: 2025-05-14 | End: 2025-05-15 | Stop reason: HOSPADM

## 2025-05-14 RX ORDER — SODIUM PHOSPHATE,MONO-DIBASIC 19G-7G/118
1 ENEMA (ML) RECTAL
Status: DISCONTINUED | OUTPATIENT
Start: 2025-05-14 | End: 2025-05-15 | Stop reason: HOSPADM

## 2025-05-14 RX ORDER — PROPOFOL 10 MG/ML
INJECTION, EMULSION INTRAVENOUS PRN
Status: DISCONTINUED | OUTPATIENT
Start: 2025-05-14 | End: 2025-05-14

## 2025-05-14 RX ORDER — ONDANSETRON 2 MG/ML
4 INJECTION INTRAMUSCULAR; INTRAVENOUS EVERY 30 MIN PRN
Status: DISCONTINUED | OUTPATIENT
Start: 2025-05-14 | End: 2025-05-14 | Stop reason: HOSPADM

## 2025-05-14 RX ORDER — SCOPOLAMINE 1 MG/3D
1 PATCH, EXTENDED RELEASE TRANSDERMAL ONCE
Status: COMPLETED | OUTPATIENT
Start: 2025-05-14 | End: 2025-05-15

## 2025-05-14 RX ORDER — FLUOXETINE 10 MG/1
20 CAPSULE ORAL DAILY
Status: DISCONTINUED | OUTPATIENT
Start: 2025-05-14 | End: 2025-05-15 | Stop reason: HOSPADM

## 2025-05-14 RX ORDER — CEFAZOLIN SODIUM/WATER 2 G/20 ML
2 SYRINGE (ML) INTRAVENOUS
Status: COMPLETED | OUTPATIENT
Start: 2025-05-14 | End: 2025-05-14

## 2025-05-14 RX ORDER — CEFAZOLIN SODIUM/WATER 2 G/20 ML
2 SYRINGE (ML) INTRAVENOUS SEE ADMIN INSTRUCTIONS
Status: DISCONTINUED | OUTPATIENT
Start: 2025-05-14 | End: 2025-05-14 | Stop reason: HOSPADM

## 2025-05-14 RX ORDER — HYDROXYZINE HYDROCHLORIDE 25 MG/1
50 TABLET, FILM COATED ORAL EVERY 6 HOURS PRN
Status: DISCONTINUED | OUTPATIENT
Start: 2025-05-14 | End: 2025-05-15 | Stop reason: HOSPADM

## 2025-05-14 RX ORDER — NALOXONE HYDROCHLORIDE 0.4 MG/ML
0.1 INJECTION, SOLUTION INTRAMUSCULAR; INTRAVENOUS; SUBCUTANEOUS
Status: DISCONTINUED | OUTPATIENT
Start: 2025-05-14 | End: 2025-05-14 | Stop reason: HOSPADM

## 2025-05-14 RX ORDER — HYDROMORPHONE HCL IN WATER/PF 6 MG/30 ML
0.2 PATIENT CONTROLLED ANALGESIA SYRINGE INTRAVENOUS
Status: DISCONTINUED | OUTPATIENT
Start: 2025-05-14 | End: 2025-05-15 | Stop reason: HOSPADM

## 2025-05-14 RX ORDER — DEXAMETHASONE SODIUM PHOSPHATE 4 MG/ML
4 INJECTION, SOLUTION INTRA-ARTICULAR; INTRALESIONAL; INTRAMUSCULAR; INTRAVENOUS; SOFT TISSUE
Status: DISCONTINUED | OUTPATIENT
Start: 2025-05-14 | End: 2025-05-14 | Stop reason: HOSPADM

## 2025-05-14 RX ORDER — ONDANSETRON 2 MG/ML
4 INJECTION INTRAMUSCULAR; INTRAVENOUS EVERY 6 HOURS PRN
Status: DISCONTINUED | OUTPATIENT
Start: 2025-05-14 | End: 2025-05-15 | Stop reason: HOSPADM

## 2025-05-14 RX ORDER — OXYCODONE HYDROCHLORIDE 5 MG/1
10 TABLET ORAL EVERY 4 HOURS PRN
Status: DISCONTINUED | OUTPATIENT
Start: 2025-05-14 | End: 2025-05-15 | Stop reason: HOSPADM

## 2025-05-14 RX ORDER — PROCHLORPERAZINE MALEATE 10 MG
10 TABLET ORAL EVERY 6 HOURS PRN
Status: DISCONTINUED | OUTPATIENT
Start: 2025-05-14 | End: 2025-05-15 | Stop reason: HOSPADM

## 2025-05-14 RX ORDER — TRANEXAMIC ACID 10 MG/ML
1 INJECTION, SOLUTION INTRAVENOUS ONCE
Status: DISCONTINUED | OUTPATIENT
Start: 2025-05-14 | End: 2025-05-14 | Stop reason: HOSPADM

## 2025-05-14 RX ORDER — NALOXONE HYDROCHLORIDE 0.4 MG/ML
0.4 INJECTION, SOLUTION INTRAMUSCULAR; INTRAVENOUS; SUBCUTANEOUS
Status: DISCONTINUED | OUTPATIENT
Start: 2025-05-14 | End: 2025-05-15 | Stop reason: HOSPADM

## 2025-05-14 RX ORDER — FENTANYL CITRATE 50 UG/ML
50 INJECTION, SOLUTION INTRAMUSCULAR; INTRAVENOUS EVERY 5 MIN PRN
Status: DISCONTINUED | OUTPATIENT
Start: 2025-05-14 | End: 2025-05-14 | Stop reason: HOSPADM

## 2025-05-14 RX ORDER — LIDOCAINE 40 MG/G
CREAM TOPICAL
Status: DISCONTINUED | OUTPATIENT
Start: 2025-05-14 | End: 2025-05-15 | Stop reason: HOSPADM

## 2025-05-14 RX ORDER — PHENAZOPYRIDINE HYDROCHLORIDE 100 MG/1
200 TABLET, FILM COATED ORAL ONCE
Status: COMPLETED | OUTPATIENT
Start: 2025-05-14 | End: 2025-05-14

## 2025-05-14 RX ORDER — ONDANSETRON 2 MG/ML
INJECTION INTRAMUSCULAR; INTRAVENOUS PRN
Status: DISCONTINUED | OUTPATIENT
Start: 2025-05-14 | End: 2025-05-14

## 2025-05-14 RX ORDER — LIDOCAINE HYDROCHLORIDE 20 MG/ML
INJECTION, SOLUTION INFILTRATION; PERINEURAL PRN
Status: DISCONTINUED | OUTPATIENT
Start: 2025-05-14 | End: 2025-05-14

## 2025-05-14 RX ORDER — ACETAMINOPHEN 325 MG/1
975 TABLET ORAL ONCE
Status: COMPLETED | OUTPATIENT
Start: 2025-05-14 | End: 2025-05-14

## 2025-05-14 RX ORDER — SODIUM CHLORIDE, SODIUM LACTATE, POTASSIUM CHLORIDE, CALCIUM CHLORIDE 600; 310; 30; 20 MG/100ML; MG/100ML; MG/100ML; MG/100ML
INJECTION, SOLUTION INTRAVENOUS CONTINUOUS
Status: DISCONTINUED | OUTPATIENT
Start: 2025-05-14 | End: 2025-05-15 | Stop reason: HOSPADM

## 2025-05-14 RX ORDER — METRONIDAZOLE 500 MG/100ML
500 INJECTION, SOLUTION INTRAVENOUS
Status: COMPLETED | OUTPATIENT
Start: 2025-05-14 | End: 2025-05-14

## 2025-05-14 RX ORDER — KETOROLAC TROMETHAMINE 15 MG/ML
15 INJECTION, SOLUTION INTRAMUSCULAR; INTRAVENOUS EVERY 6 HOURS
Status: DISCONTINUED | OUTPATIENT
Start: 2025-05-14 | End: 2025-05-15 | Stop reason: HOSPADM

## 2025-05-14 RX ORDER — NALOXONE HYDROCHLORIDE 0.4 MG/ML
0.2 INJECTION, SOLUTION INTRAMUSCULAR; INTRAVENOUS; SUBCUTANEOUS
Status: DISCONTINUED | OUTPATIENT
Start: 2025-05-14 | End: 2025-05-15 | Stop reason: HOSPADM

## 2025-05-14 RX ORDER — DEXAMETHASONE SODIUM PHOSPHATE 4 MG/ML
INJECTION, SOLUTION INTRA-ARTICULAR; INTRALESIONAL; INTRAMUSCULAR; INTRAVENOUS; SOFT TISSUE PRN
Status: DISCONTINUED | OUTPATIENT
Start: 2025-05-14 | End: 2025-05-14

## 2025-05-14 RX ORDER — HYDROMORPHONE HYDROCHLORIDE 1 MG/ML
0.4 INJECTION, SOLUTION INTRAMUSCULAR; INTRAVENOUS; SUBCUTANEOUS EVERY 5 MIN PRN
Status: DISCONTINUED | OUTPATIENT
Start: 2025-05-14 | End: 2025-05-14 | Stop reason: HOSPADM

## 2025-05-14 RX ORDER — HYDROMORPHONE HCL IN WATER/PF 6 MG/30 ML
0.4 PATIENT CONTROLLED ANALGESIA SYRINGE INTRAVENOUS
Status: DISCONTINUED | OUTPATIENT
Start: 2025-05-14 | End: 2025-05-15 | Stop reason: HOSPADM

## 2025-05-14 RX ORDER — BISACODYL 10 MG
10 SUPPOSITORY, RECTAL RECTAL DAILY PRN
Status: DISCONTINUED | OUTPATIENT
Start: 2025-05-14 | End: 2025-05-15 | Stop reason: HOSPADM

## 2025-05-14 RX ORDER — HYDROMORPHONE HYDROCHLORIDE 1 MG/ML
0.2 INJECTION, SOLUTION INTRAMUSCULAR; INTRAVENOUS; SUBCUTANEOUS EVERY 5 MIN PRN
Status: DISCONTINUED | OUTPATIENT
Start: 2025-05-14 | End: 2025-05-14 | Stop reason: HOSPADM

## 2025-05-14 RX ORDER — APREPITANT 40 MG/1
40 CAPSULE ORAL ONCE
Status: COMPLETED | OUTPATIENT
Start: 2025-05-14 | End: 2025-05-14

## 2025-05-14 RX ORDER — HYDROXYZINE HYDROCHLORIDE 50 MG/ML
25 INJECTION, SOLUTION INTRAMUSCULAR ONCE
Status: COMPLETED | OUTPATIENT
Start: 2025-05-14 | End: 2025-05-14

## 2025-05-14 RX ORDER — KETOROLAC TROMETHAMINE 30 MG/ML
30 INJECTION, SOLUTION INTRAMUSCULAR; INTRAVENOUS ONCE
Status: COMPLETED | OUTPATIENT
Start: 2025-05-14 | End: 2025-05-14

## 2025-05-14 RX ADMIN — HYDROMORPHONE HYDROCHLORIDE 0.2 MG: 0.2 INJECTION, SOLUTION INTRAMUSCULAR; INTRAVENOUS; SUBCUTANEOUS at 18:05

## 2025-05-14 RX ADMIN — HYDROXYZINE HYDROCHLORIDE 25 MG: 50 INJECTION, SOLUTION INTRAMUSCULAR at 12:27

## 2025-05-14 RX ADMIN — Medication 20 MG: at 10:11

## 2025-05-14 RX ADMIN — FENTANYL CITRATE 50 MCG: 50 INJECTION INTRAMUSCULAR; INTRAVENOUS at 10:40

## 2025-05-14 RX ADMIN — ONDANSETRON 4 MG: 2 INJECTION INTRAMUSCULAR; INTRAVENOUS at 09:56

## 2025-05-14 RX ADMIN — FENTANYL CITRATE 100 MCG: 50 INJECTION INTRAMUSCULAR; INTRAVENOUS at 09:45

## 2025-05-14 RX ADMIN — Medication 200 MG: at 11:12

## 2025-05-14 RX ADMIN — BENZOCAINE 6 MG-MENTHOL 10 MG LOZENGES 1 LOZENGE: at 21:09

## 2025-05-14 RX ADMIN — METRONIDAZOLE 500 MG: 500 INJECTION, SOLUTION INTRAVENOUS at 08:46

## 2025-05-14 RX ADMIN — ROCURONIUM BROMIDE 50 MG: 10 INJECTION INTRAVENOUS at 09:47

## 2025-05-14 RX ADMIN — FENTANYL CITRATE 50 MCG: 50 INJECTION INTRAMUSCULAR; INTRAVENOUS at 12:26

## 2025-05-14 RX ADMIN — SCOPOLAMINE 1 PATCH: 1.5 PATCH, EXTENDED RELEASE TRANSDERMAL at 08:48

## 2025-05-14 RX ADMIN — ROCURONIUM BROMIDE 20 MG: 10 INJECTION INTRAVENOUS at 10:51

## 2025-05-14 RX ADMIN — ACETAMINOPHEN 975 MG: 325 TABLET, FILM COATED ORAL at 08:47

## 2025-05-14 RX ADMIN — SODIUM CHLORIDE, SODIUM LACTATE, POTASSIUM CHLORIDE, AND CALCIUM CHLORIDE: .6; .31; .03; .02 INJECTION, SOLUTION INTRAVENOUS at 13:16

## 2025-05-14 RX ADMIN — PROPOFOL 150 MCG/KG/MIN: 10 INJECTION, EMULSION INTRAVENOUS at 09:51

## 2025-05-14 RX ADMIN — OXYCODONE HYDROCHLORIDE 5 MG: 5 TABLET ORAL at 15:03

## 2025-05-14 RX ADMIN — LIDOCAINE HYDROCHLORIDE 80 MG: 20 INJECTION, SOLUTION INFILTRATION; PERINEURAL at 09:47

## 2025-05-14 RX ADMIN — DEXAMETHASONE SODIUM PHOSPHATE 10 MG: 4 INJECTION, SOLUTION INTRA-ARTICULAR; INTRALESIONAL; INTRAMUSCULAR; INTRAVENOUS; SOFT TISSUE at 09:56

## 2025-05-14 RX ADMIN — APREPITANT 40 MG: 40 CAPSULE ORAL at 08:54

## 2025-05-14 RX ADMIN — ACETAMINOPHEN 975 MG: 325 TABLET, FILM COATED ORAL at 21:02

## 2025-05-14 RX ADMIN — PHENAZOPYRIDINE 200 MG: 100 TABLET ORAL at 08:47

## 2025-05-14 RX ADMIN — KETOROLAC TROMETHAMINE 30 MG: 30 INJECTION, SOLUTION INTRAMUSCULAR; INTRAVENOUS at 12:58

## 2025-05-14 RX ADMIN — KETOROLAC TROMETHAMINE 30 MG: 30 INJECTION, SOLUTION INTRAMUSCULAR at 12:58

## 2025-05-14 RX ADMIN — FENTANYL CITRATE 50 MCG: 50 INJECTION INTRAMUSCULAR; INTRAVENOUS at 12:05

## 2025-05-14 RX ADMIN — PROPOFOL 200 MG: 10 INJECTION, EMULSION INTRAVENOUS at 09:47

## 2025-05-14 RX ADMIN — Medication 30 MG: at 09:45

## 2025-05-14 RX ADMIN — OXYCODONE HYDROCHLORIDE 10 MG: 5 TABLET ORAL at 21:04

## 2025-05-14 RX ADMIN — FLUOXETINE HYDROCHLORIDE 10 MG: 10 CAPSULE ORAL at 18:03

## 2025-05-14 RX ADMIN — GLYCOPYRROLATE 0.2 MG: 0.2 INJECTION, SOLUTION INTRAMUSCULAR; INTRAVENOUS at 10:22

## 2025-05-14 RX ADMIN — KETOROLAC TROMETHAMINE 15 MG: 15 INJECTION, SOLUTION INTRAMUSCULAR; INTRAVENOUS at 19:19

## 2025-05-14 RX ADMIN — SODIUM CHLORIDE, SODIUM LACTATE, POTASSIUM CHLORIDE, AND CALCIUM CHLORIDE: .6; .31; .03; .02 INJECTION, SOLUTION INTRAVENOUS at 10:40

## 2025-05-14 RX ADMIN — MIDAZOLAM 2 MG: 1 INJECTION INTRAMUSCULAR; INTRAVENOUS at 09:43

## 2025-05-14 RX ADMIN — SODIUM CHLORIDE, SODIUM LACTATE, POTASSIUM CHLORIDE, AND CALCIUM CHLORIDE: .6; .31; .03; .02 INJECTION, SOLUTION INTRAVENOUS at 09:03

## 2025-05-14 RX ADMIN — Medication 2 G: at 09:38

## 2025-05-14 ASSESSMENT — ACTIVITIES OF DAILY LIVING (ADL)
ADLS_ACUITY_SCORE: 15
ADLS_ACUITY_SCORE: 22
ADLS_ACUITY_SCORE: 17
ADLS_ACUITY_SCORE: 22
ADLS_ACUITY_SCORE: 20
ADLS_ACUITY_SCORE: 20
ADLS_ACUITY_SCORE: 15
ADLS_ACUITY_SCORE: 22
ADLS_ACUITY_SCORE: 22
ADLS_ACUITY_SCORE: 17
ADLS_ACUITY_SCORE: 15
ADLS_ACUITY_SCORE: 15

## 2025-05-14 NOTE — INTERVAL H&P NOTE
I have reviewed the surgical (or preoperative) H&P that is linked to this encounter, and examined the patient. There are no significant changes.  Consent forms reviewed and signed.  All questions answered. Pt desires to proceed.

## 2025-05-14 NOTE — ANESTHESIA POSTPROCEDURE EVALUATION
Patient: Anabella Albrecht    Procedure: Procedure(s):  HYSTERECTOMY, VAGINAL, LAPAROSCOPY-ASSISTED, WITH SALPINGO-OOPHORECTOMY lysis of adhesions,fulgeration of endometriosis  CYSTOSCOPY       Anesthesia Type:  General    Note:  Disposition: Admission   Postop Pain Control: Uneventful            Sign Out: Well controlled pain   PONV: No   Neuro/Psych: Uneventful            Sign Out: Acceptable/Baseline neuro status   Airway/Respiratory: Uneventful            Sign Out: Acceptable/Baseline resp. status   CV/Hemodynamics: Uneventful            Sign Out: Acceptable CV status; No obvious hypovolemia; No obvious fluid overload   Other NRE: NONE   DID A NON-ROUTINE EVENT OCCUR? No       Last vitals:  Vitals Value Taken Time   /82 05/14/25 13:10   Temp 97.2  F (36.2  C) 05/14/25 13:10   Pulse 81 05/14/25 13:12   Resp 13 05/14/25 13:12   SpO2 95 % 05/14/25 13:12   Vitals shown include unfiled device data.    Electronically Signed By: NAMRATA Mills CRNA  May 14, 2025  1:23 PM

## 2025-05-14 NOTE — PLAN OF CARE
"Goal Outcome Evaluation:    Plan of Care Reviewed With: patient    Overall Patient Progress: improving  Overall Patient Progress: improving    Outcome Evaluation: Patient progressing in post-surgical goals.    /76 (Patient Position: Semi-Parsons's, Cuff Size: Adult Regular)   Pulse 83   Temp 98.1  F (36.7  C) (Oral)   Resp 16   Ht 1.676 m (5' 6\")   Wt 82.6 kg (182 lb)   SpO2 (!) 91%   BMI 29.38 kg/m       Patient reports doing well. Pain well managed. Denies N/V. VSS, see flow sheets for full assessment.           "

## 2025-05-14 NOTE — ANESTHESIA CARE TRANSFER NOTE
Patient: Anabella Albrecht    Procedure: Procedure(s):  HYSTERECTOMY, VAGINAL, LAPAROSCOPY-ASSISTED, WITH SALPINGO-OOPHORECTOMY lysis of adhesions,fulgeration of endometriosis  CYSTOSCOPY       Diagnosis: Abnormal uterine bleeding (AUB) [N93.9]  Thickened endometrium [R93.89]  Pelvic pain in female [R10.2]  Endometriosis [N80.9]  Diagnosis Additional Information: No value filed.    Anesthesia Type:   General     Note:    Oropharynx: spontaneously breathing  Level of Consciousness: awake and drowsy  Oxygen Supplementation: nasal cannula    Independent Airway: airway patency satisfactory and stable  Dentition: dentition unchanged  Vital Signs Stable: post-procedure vital signs reviewed and stable  Report to RN Given: handoff report given  Patient transferred to: PACU    Handoff Report: Identifed the Patient, Identified the Reponsible Provider, Reviewed the pertinent medical history, Discussed the surgical course, Reviewed Intra-OP anesthesia mangement and issues during anesthesia, Set expectations for post-procedure period and Allowed opportunity for questions and acknowledgement of understanding  Vitals:  Vitals Value Taken Time   BP 97/58 05/14/25 11:34   Temp     Pulse 77 05/14/25 11:38   Resp 19 05/14/25 11:38   SpO2 93 % 05/14/25 11:38   Vitals shown include unfiled device data.    Electronically Signed By: NAMRATA Mills CRNA  May 14, 2025  11:38 AM

## 2025-05-14 NOTE — OP NOTE
[facility]    Pre-operative diagnosis: Abnormal uterine bleeding (AUB) [N93.9]  Thickened endometrium [R93.89]  Pelvic pain in female [R10.2]  Endometriosis [N80.9]   Post-operative diagnosis Same, Pathology pending.  Stage 1 endometriosis and RLQ abdominal adhesions.    Procedure: Procedure(s):  HYSTERECTOMY, VAGINAL, LAPAROSCOPY-ASSISTED, WITH SALPINGO-OOPHORECTOMY  CYSTOSCOPY.  Lysis of adhesions.  Fulguration of endometriosis.    Surgeon:  Assistant: MD Montserrat Loera NP (assistance required for retraction, exposure, instrument handling, and wound closure)      Anesthesia: General    Estimated blood loss: Less than 100 ml   Blood transfusion: No transfusion was given during surgery   Drains: Abdullahi catheter   Specimens: Uterus, cervix, fallopian tubes, ovaries.   Findings: RLQ cecal adhesions.  Superficial peritoneal implants of uterosacral ligaments/pelvic sidewall bilaterally.   Otherwise normal pelvic anatomy.  On cystoscopy there was no evidence of sutures or perforation and bilateral ureteral jets were noted post procedure.   Complications: None   Condition: Stable         OPERATIVE DICTATION: The patient was brought to the operating room and uneventfully placed under general anesthesia. She was prepped and draped in the dorsal lithotomy position and her bladder drained. The cervix was visualized with a weighted speculum and grasped anteriorly with a fine-tooth tenaculum. The cervix was gently dilated with Foreman dilators and a uterine manipulating device placed. We then changed gloves and proceeded to the abdominal portion of the case. A 5 mm infraumbilical skin incision was performed with a scalpel. A Veress needle was introduced without difficulty and a water drop test performed. The abdomen was insufflated with several liters of carbon dioxide. A 5 mm trocar and the laparoscope were introduced. Visualization was excellent. Findings were as described above. Next, two lower  lateral 5 mm trocars were placed under direct visualization. After initial exploration, the uterus was elevated. All visible endometriotic implants were fulgurated with monopolar cautery and the RLQ adhesions were lysed with sharp and blunt dissection. The LigaSure bipolar cutting cautery device was used to transect the IP ligament away from pelvic sidewall structures.  The dissection was continued down through the mesosalpinx, broad,  and round ligaments adjacent to the uterus.  A bladder flap was initiated using sharp and blunt dissection and the uterine artery skeletonized within the cardinal ligament. The uterine artery was cauterized with the LigaSure. The same procedure was repeated on the patient's right side and the bladder flap was completed in the midline. At this point, the pelvis was checked and found to be hemostatic. We then proceeded to the vaginal portion of the case. Excess carbon dioxide was expressed from the abdomen but the trocars left in place. The patient was placed in the high lithotomy position. The uterine manipulating device was removed and the cervix visualized with a weighted speculum. The cervix was grasped with two fine-toothed tenaculum. A circumferential cervical incision was performed with a scalpel. Posterior colpotomy was performed sharply without difficulty. The uterosacral ligaments were clamped, cut and suture ligated with 0 Vicryl. The bladder was then dissected anteriorly off the lower uterine segment and cervix and anterior colpotomy performed. A [place] retractor was placed to displace the bladder superiorly. The remaining cardinal ligament complexes were clamped, cut and and ligated with the the handheld Ligasure. The uterus was removed. A pack was placed to displace the bowel out of the pelvis. An external Garcia suture was performed in the usual fashion using 0 Vicryl and plicating the uterosacral ligaments high in the pelvis. The Garcia's suture was tagged. A  reperitonealizing stitch of 0 Vicryl was placed in a pursestring fashion incorporating the uterosacral ligament into the closure. The packing was removed and the pursestring tied. The vaginal cuff was closed with interrupted 2-0 Vicryl sutures with the uterosacral ligament complexes being incorporated it into the vaginal angle sutures. The cuff was irrigated and checked for hemostasis. The Garcia's suture was tied with resulting excellent apical vaginal support. Cystoscopy was then performed using a 70-degree rigid cystoscope with normal saline as distention media. Visualization was excellent. Bilateral ureteral jets were noted. There is no evidence of bladder perforation or suture. The cystoscope was withdrawn and a Abdullahi catheter placed. We then changed gloves and re-proceeded with laparoscopy. The abdomen was reinsufflated with carbon dioxide. The pelvis was irrigated and found to be hemostatic. The trocars were then removed under direct visualization and we proceeded to closure. The subcutaneous spaces were irrigated and checked for hemostasis. The skin incisions were closed with subcuticular 3.0 Monocryl and surgical glue. There were no complications and the patient was transferred to the recovery room in excellent stable condition.   Eric Sood MD  5/14/2025  9:26 AM

## 2025-05-14 NOTE — PLAN OF CARE
[facility]     Patient is a 49 year old year old female received via bed from the PACU into 4204/4204-1 at 1:25 PM on May 14, 2025. Report was received from Montserrat MILTON RN using SBAR format.  Patient is status post: hysterectomy salpingo-oophorectomy. Postoperative vitals were initiated per order-set. Patient is alert and oriented x 3.    Patient reports pain as moderate is rated as 7/10 and appears no acute distress. Patient last received pain medication at 12:25 & 1300. Please see document flowsheets for further pain assessments and interventions.     Patient's incision is noted to be without signs of infection, to be dry and intact. Patient does not have drain in place.     Patient oriented to room, unit and plan of care. Explained admission packet with patient bill of rights brochure and room service for meals. Will continue to monitor and document as needed.

## 2025-05-14 NOTE — OR NURSING
PACU Respiratory Event Documentation     1) Episodes of Apnea greater than or equal to 10 seconds: 0    2) Bradypnea - less than 8 breaths per minute: 0    3) Pain score on 0 to 10 scale: 3/10     4) Pain-sedation mismatch (yes or no): NA     5) Repeated 02 desaturation less than 90% (yes or no): Weaned 02 and at 02 at 2 LPM     Anesthesia notified? (yes or no): NA     Any of the above events occuring repeatedly in separate 30 minute intervals may be considered recurrent PACU respiratory events.

## 2025-05-14 NOTE — ANESTHESIA PROCEDURE NOTES
Airway       Patient location during procedure: OR       Procedure Start/Stop Times: 5/14/2025 9:50 AM  Staff -        CRNA: Yannick Clark APRN CRNA       Performed By: CRNA  Consent for Airway        Urgency: elective  Indications and Patient Condition       Indications for airway management: jessika-procedural       Induction type:intravenous       Mask difficulty assessment: 1 - vent by mask    Final Airway Details       Final airway type: endotracheal airway       Successful airway: ETT - single and Oral  Endotracheal Airway Details        ETT size (mm): 6.5       Cuffed: yes       Successful intubation technique: direct laryngoscopy       DL Blade Type: Hobbs 2       Grade View of Cords: 3       Adjucts: stylet       Position: Right       Measured from: lips       Secured at (cm): 21       Bite block used: None    Post intubation assessment        Placement verified by: capnometry, equal breath sounds and chest rise        Number of attempts at approach: 2       Number of other approaches attempted: 1       Secured with: plastic tape       Ease of procedure: easy       Dentition: Intact and Unchanged    Medication(s) Administered   Medication Administration Time: 5/14/2025 9:50 AM    Additional Comments       7.0 ett would not pass easily through cords, down sized to 6.5

## 2025-05-14 NOTE — PLAN OF CARE
Patient up at bedside. Rates pain 3/10 - tolerable. Patient's espinoza removed. Pt tolerated well. VSS, see flowsheets. Pt tolerating oral fluids and crackers.

## 2025-05-15 VITALS
HEIGHT: 66 IN | HEART RATE: 65 BPM | DIASTOLIC BLOOD PRESSURE: 54 MMHG | RESPIRATION RATE: 16 BRPM | OXYGEN SATURATION: 95 % | TEMPERATURE: 98.1 F | SYSTOLIC BLOOD PRESSURE: 102 MMHG | BODY MASS INDEX: 29.25 KG/M2 | WEIGHT: 182 LBS

## 2025-05-15 LAB
ANION GAP SERPL CALCULATED.3IONS-SCNC: 11 MMOL/L (ref 7–15)
BUN SERPL-MCNC: 10 MG/DL (ref 6–20)
CALCIUM SERPL-MCNC: 8.8 MG/DL (ref 8.8–10.4)
CHLORIDE SERPL-SCNC: 103 MMOL/L (ref 98–107)
CREAT SERPL-MCNC: 0.81 MG/DL (ref 0.51–0.95)
EGFRCR SERPLBLD CKD-EPI 2021: 90 ML/MIN/1.73M2
ERYTHROCYTE [DISTWIDTH] IN BLOOD BY AUTOMATED COUNT: 12.3 % (ref 10–15)
GLUCOSE SERPL-MCNC: 124 MG/DL (ref 70–99)
HCO3 SERPL-SCNC: 23 MMOL/L (ref 22–29)
HCT VFR BLD AUTO: 33 % (ref 35–47)
HGB BLD-MCNC: 11.3 G/DL (ref 11.7–15.7)
MCH RBC QN AUTO: 30.4 PG (ref 26.5–33)
MCHC RBC AUTO-ENTMCNC: 34.2 G/DL (ref 31.5–36.5)
MCV RBC AUTO: 89 FL (ref 78–100)
PLATELET # BLD AUTO: 252 10E3/UL (ref 150–450)
POTASSIUM SERPL-SCNC: 4.4 MMOL/L (ref 3.4–5.3)
RBC # BLD AUTO: 3.72 10E6/UL (ref 3.8–5.2)
SODIUM SERPL-SCNC: 137 MMOL/L (ref 135–145)
WBC # BLD AUTO: 10 10E3/UL (ref 4–11)

## 2025-05-15 PROCEDURE — 85014 HEMATOCRIT: CPT | Performed by: OBSTETRICS & GYNECOLOGY

## 2025-05-15 PROCEDURE — 80051 ELECTROLYTE PANEL: CPT | Performed by: OBSTETRICS & GYNECOLOGY

## 2025-05-15 PROCEDURE — 36415 COLL VENOUS BLD VENIPUNCTURE: CPT | Performed by: OBSTETRICS & GYNECOLOGY

## 2025-05-15 PROCEDURE — 250N000013 HC RX MED GY IP 250 OP 250 PS 637: Performed by: OBSTETRICS & GYNECOLOGY

## 2025-05-15 RX ORDER — AMOXICILLIN 250 MG
1 CAPSULE ORAL 2 TIMES DAILY PRN
Qty: 30 TABLET | Refills: 0 | Status: SHIPPED | OUTPATIENT
Start: 2025-05-15

## 2025-05-15 RX ORDER — OXYCODONE HYDROCHLORIDE 5 MG/1
5 TABLET ORAL EVERY 4 HOURS PRN
Qty: 16 TABLET | Refills: 0 | Status: SHIPPED | OUTPATIENT
Start: 2025-05-15

## 2025-05-15 RX ORDER — IBUPROFEN 800 MG/1
800 TABLET, FILM COATED ORAL EVERY 8 HOURS PRN
Qty: 40 TABLET | Refills: 1 | Status: SHIPPED | OUTPATIENT
Start: 2025-05-15

## 2025-05-15 RX ADMIN — IBUPROFEN 800 MG: 800 TABLET, FILM COATED ORAL at 08:16

## 2025-05-15 RX ADMIN — ACETAMINOPHEN 975 MG: 325 TABLET, FILM COATED ORAL at 04:12

## 2025-05-15 RX ADMIN — ACETAMINOPHEN 975 MG: 325 TABLET, FILM COATED ORAL at 10:12

## 2025-05-15 RX ADMIN — FLUOXETINE HYDROCHLORIDE 20 MG: 10 CAPSULE ORAL at 08:16

## 2025-05-15 RX ADMIN — SENNOSIDES AND DOCUSATE SODIUM 1 TABLET: 50; 8.6 TABLET ORAL at 08:16

## 2025-05-15 RX ADMIN — SENNOSIDES AND DOCUSATE SODIUM 1 TABLET: 50; 8.6 TABLET ORAL at 01:52

## 2025-05-15 RX ADMIN — IBUPROFEN 800 MG: 800 TABLET, FILM COATED ORAL at 01:52

## 2025-05-15 ASSESSMENT — ACTIVITIES OF DAILY LIVING (ADL)
ADLS_ACUITY_SCORE: 22

## 2025-05-15 NOTE — DISCHARGE SUMMARY
"Discharge Summary    Anabella Albrecht MRN# [id number]   YOB: 1975 Age: 49 year old     Date of Admission:  2025  Date of Discharge:  5/15/2025  Admitting Physician:  Eric Sood MD  Discharge Physician:  Eric Sood MD  Discharging Service:  Obstetrics and Gynecology     Primary Provider: Caterina Hdez          Admission Diagnoses:   Abnormal uterine bleeding (AUB) [N93.9]  Thickened endometrium [R93.89]  Pelvic pain in female [R10.2]  Endometriosis [N80.9]          Discharge Diagnosis:   S/p hysterectomy    Patient Active Problem List   Diagnosis    Scoliosis (and kyphoscoliosis), idiopathic    Anxiety state    Dysmenorrhea    ACP (advance care planning)    Menorrhagia with regular cycle    Contraception    PMDD (premenstrual dysphoric disorder)    Chronic bilateral thoracic back pain    Intertrigo    Chronic pain of both shoulders    Encounter for pharmacogenetic testing    [id number] intermediate metabolizer (H)    [id number] intermediate metabolizer (H)    CY intermediate metabolizer (H)    S/P hysterectomy                Discharge Disposition:     Discharged to home           Condition on Discharge:     Discharge condition: Stable   Discharge vitals: Blood pressure 102/54, pulse 65, temperature 98.1  F (36.7  C), temperature source Oral, resp. rate 16, height 1.676 m (5' 6\"), weight 82.6 kg (182 lb), SpO2 95%, not currently breastfeeding.   Code status on discharge: Full Code           Procedures / Labs / Imaging:   Invasive procedures:   25    HYSTERECTOMY, VAGINAL, LAPAROSCOPY-ASSISTED, WITH SALPINGO-OOPHORECTOMY lysis of adhesions,fulgeration of endometriosis   CYSTOSCOPY                Medications Prior to Admission:     Medications Prior to Admission   Medication Sig Dispense Refill Last Dose/Taking    cetirizine (ZYRTEC) 10 MG tablet Take 1 tablet (10 mg) by mouth daily. 90 tablet 3 More than a month    FLUoxetine (PROZAC) 20 MG capsule Take 1 capsule (20 mg) by mouth daily. 90 " capsule 1 5/13/2025    [DISCONTINUED] estradiol (ESTRACE) 1 MG tablet Take 1 tablet (1 mg) by mouth daily. 90 tablet 1 Past Week    [DISCONTINUED] progesterone (PROMETRIUM) 100 MG capsule Take 1 capsule (100 mg) by mouth daily. 90 capsule 1 Past Week             Discharge Medications:     Current Discharge Medication List        START taking these medications    Details   estrogen conj-medroxyPROGESTERone (PREMPRO) 0.3-1.5 MG tablet Take 1 tablet by mouth daily.  Qty: 90 tablet, Refills: 3    Associated Diagnoses: S/P hysterectomy      ibuprofen (ADVIL/MOTRIN) 800 MG tablet Take 1 tablet (800 mg) by mouth every 8 hours as needed for moderate pain.  Qty: 40 tablet, Refills: 1    Associated Diagnoses: S/P hysterectomy      oxyCODONE (ROXICODONE) 5 MG tablet Take 1 tablet (5 mg) by mouth every 4 hours as needed for moderate pain.  Qty: 16 tablet, Refills: 0    Associated Diagnoses: S/P hysterectomy      senna-docusate (SENOKOT-S/PERICOLACE) 8.6-50 MG tablet Take 1 tablet by mouth 2 times daily as needed for constipation.  Qty: 30 tablet, Refills: 0    Associated Diagnoses: S/P hysterectomy           CONTINUE these medications which have NOT CHANGED    Details   cetirizine (ZYRTEC) 10 MG tablet Take 1 tablet (10 mg) by mouth daily.  Qty: 90 tablet, Refills: 3    Associated Diagnoses: Seasonal allergic rhinitis, unspecified trigger      FLUoxetine (PROZAC) 20 MG capsule Take 1 capsule (20 mg) by mouth daily.  Qty: 90 capsule, Refills: 1    Associated Diagnoses: JASON (generalized anxiety disorder)           STOP taking these medications       estradiol (ESTRACE) 1 MG tablet Comments:   Reason for Stopping:         progesterone (PROMETRIUM) 100 MG capsule Comments:   Reason for Stopping:                     Consultations:     No consultations were requested during this admission             Brief History of Illness:   Anabella Albrecht is a 49 year old female who was admitted for planned surgery          Hospital Course:        S/P hysterectomy    * No resolved hospital problems. *    Uncomplicate postoperative course.  Patient remained afebrile throughout admission and was discharged on POD # 1               Significant Results:     None             Pending Results:     None           Discharge Instructions and Follow-Up:     Discharge diet: Regular   Discharge activity: No lifting,  or strenuous exercise for 4 week(s)  No driving or operating machinery while on narcotic analgesics  Pelvic rest: abstain from intercourse and do not use tampons for 6 week(s)   Discharge follow-up: Follow up with me in 4 weeks   Wound care: None   Other instructions: None

## 2025-05-15 NOTE — PLAN OF CARE
Goal Outcome Evaluation:      Plan of Care Reviewed With: patient    Overall Patient Progress: improvingOverall Patient Progress: improving    Outcome Evaluation: pt will progress in all goal areas      Pt resting on and off in bed.  Denies pain at this time.  VSS.  Trochar sites WDL, scant vaginal bleeding.  Pt voiding without difficulty and was going to order breakfast.  Will continue to monitor pt and provide cares as needed.

## 2025-05-15 NOTE — PLAN OF CARE
Goal Outcome Evaluation:      Plan of Care Reviewed With: patient    Overall Patient Progress: improvingOverall Patient Progress: improving    Outcome Evaluation: pt will progress in all goal areas    Order for discharge.  AVS reviewed with pt and spouse and they verbalized understanding.  Pt discharged accompanied by spouse and staff via wheelchair.  Pt reports adequate pain control during hospitalization.

## 2025-05-15 NOTE — PLAN OF CARE
Face to face report given with opportunity to observe patient.    Report given to Lynne Hernandez RN   5/15/2025  7:25 AM

## 2025-05-15 NOTE — PLAN OF CARE
Face to face report given with opportunity to observe patient.    Report given to BIBI Salazar RN   5/14/2025  7:13 PM

## 2025-05-15 NOTE — DISCHARGE INSTRUCTIONS
No heavy lifting greater than 15 lb for 4 weeks  No driving today or while on pain meds  Pelvic rest for 6 weeks  No vigorous activity, exercise, swim, bath for 4 weeks  Schedule Postop check Dr. Sood 4 weeks  Call Dr. Sood [phone] as necessary if problems in interim

## 2025-05-15 NOTE — PLAN OF CARE
"Assessments as charted. Pt up to bathroom with stand by assist X 2 and voiding without difficulty. Pain well managed with prn oxycodone. Incisions have no signs of infection. Scant sanguinous drainage noted from R trochar site at start of shift, now resolved. Pt now on room air and able to maintain O2 saturation. SCD's on and patient denies needs at this time. Call light in reach.     Problem: Adult Inpatient Plan of Care  Goal: Plan of Care Review  Description: The Plan of Care Review/Shift note should be completed every shift.  The Outcome Evaluation is a brief statement about your assessment that the patient is improving, declining, or no change.  This information will be displayed automatically on your shiftnote.  Outcome: Progressing  Flowsheets (Taken 5/15/2025 0124)  Outcome Evaluation: Patient progressing towards goals  Plan of Care Reviewed With: patient  Overall Patient Progress: improving  Goal: Patient-Specific Goal (Individualized)  Description: You can add care plan individualizations to a care plan. Examples of Individualization might be:  \"Parent requests to be called daily at 9am for status\", \"I have a hard time hearing out of my right ear\", or \"Do not touch me to wake me up as it startlesme\".  Outcome: Progressing  Goal: Absence of Hospital-Acquired Illness or Injury  Outcome: Progressing  Intervention: Prevent Skin Injury  Recent Flowsheet Documentation  Taken 5/15/2025 0000 by Nakita Hernandez, RN  Body Position: position changed independently  Taken 5/14/2025 1933 by Nakita Hernandez, RN  Body Position: position changed independently  Intervention: Prevent and Manage VTE (Venous Thromboembolism) Risk  Recent Flowsheet Documentation  Taken 5/15/2025 0000 by Nakita Hernandez, RN  VTE Prevention/Management: SCDs on (sequential compression devices)  Goal: Optimal Comfort and Wellbeing  Outcome: Progressing  Intervention: Monitor Pain and Promote Comfort  Recent Flowsheet Documentation  Taken " 5/14/2025 2104 by Nakita Hernandez, RN  Pain Management Interventions: medication (see MAR)  Taken 5/14/2025 2102 by Nakita Hernandez RN  Pain Management Interventions: medication (see MAR)  Taken 5/14/2025 1933 by Nakita Hernandez RN  Pain Management Interventions: medication (see MAR)  Taken 5/14/2025 1919 by Nakita Hernandez RN  Pain Management Interventions: medication (see MAR)  Goal: Readiness for Transition of Care  Outcome: Progressing   Goal Outcome Evaluation:      Plan of Care Reviewed With: patient    Overall Patient Progress: improvingOverall Patient Progress: improving    Outcome Evaluation: Patient progressing towards goals

## 2025-05-16 ENCOUNTER — RESULTS FOLLOW-UP (OUTPATIENT)
Dept: SURGERY | Facility: CLINIC | Age: 50
End: 2025-05-16

## 2025-05-16 LAB
PATH REPORT.COMMENTS IMP SPEC: NORMAL
PATH REPORT.FINAL DX SPEC: NORMAL
PATH REPORT.GROSS SPEC: NORMAL
PATH REPORT.MICROSCOPIC SPEC OTHER STN: NORMAL
PATH REPORT.RELEVANT HX SPEC: NORMAL
PHOTO IMAGE: NORMAL

## 2025-06-17 ENCOUNTER — OFFICE VISIT (OUTPATIENT)
Dept: OBGYN | Facility: OTHER | Age: 50
End: 2025-06-17
Attending: OBSTETRICS & GYNECOLOGY
Payer: COMMERCIAL

## 2025-06-17 VITALS
DIASTOLIC BLOOD PRESSURE: 76 MMHG | HEART RATE: 76 BPM | SYSTOLIC BLOOD PRESSURE: 106 MMHG | BODY MASS INDEX: 29.25 KG/M2 | WEIGHT: 182 LBS | HEIGHT: 66 IN

## 2025-06-17 DIAGNOSIS — Z98.890 POST-OPERATIVE STATE: ICD-10-CM

## 2025-06-17 DIAGNOSIS — N95.1 MENOPAUSAL SYNDROME (HOT FLASHES): Primary | ICD-10-CM

## 2025-06-17 RX ORDER — ESTRADIOL 0.5 MG/1
0.5 TABLET ORAL DAILY
Qty: 90 TABLET | Refills: 3 | Status: SHIPPED | OUTPATIENT
Start: 2025-06-17

## 2025-06-17 ASSESSMENT — PAIN SCALES - GENERAL: PAINLEVEL_OUTOF10: NO PAIN (0)

## 2025-06-17 NOTE — PROGRESS NOTES
"ADAMS Albrecht is a 49 year old female presents for post operative check. She is  4  week(s) status post LAVH, BSO.  She reports doing well and denies significant pain or bleeding.  Bowel and bladder function is satisfactory. Denies incisional problems.   + hot flashes/night sweats. Significant findings Endometriosis    O.  Blood pressure 106/76, pulse 76, height 1.676 m (5' 6\"), weight 82.6 kg (182 lb), not currently breastfeeding.    Abd: soft, non-tender, non-distended. Incisions clear, dry, and intact without evidence of infection.  Vagina well supported.      A. /P. Satisfactory post-op check.Released from restrictions 6 wks PO..  Surgical menopause.:  Low dose ERT R/B/A discussed. Estrace 0.5mg daily  F/u prn problems or at next annual examination.    Eric Sood MD   "

## 2025-06-25 ENCOUNTER — TELEPHONE (OUTPATIENT)
Dept: OBGYN | Facility: OTHER | Age: 50
End: 2025-06-25

## 2025-06-25 DIAGNOSIS — N95.1 MENOPAUSAL SYNDROME (HOT FLASHES): Primary | ICD-10-CM

## 2025-06-25 RX ORDER — MEDROXYPROGESTERONE ACETATE 2.5 MG/1
2.5 TABLET ORAL DAILY
Qty: 90 TABLET | Refills: 3 | Status: SHIPPED | OUTPATIENT
Start: 2025-06-25

## 2025-06-25 NOTE — TELEPHONE ENCOUNTER
OK.  The combined pill is not covered by her insurance but can take the progesterone as a separate pill daily.  Erx done to [facility] for Provera 2.5mg daily in addition to the estrogen she is taking.

## 2025-06-25 NOTE — TELEPHONE ENCOUNTER
"Patient called and has been taking the estrodial 0.5mg x2 weeks now.  Patient would like to try the dual estrogen & progesterone combo pill that was discussed at her recent appt..  Pt states, \"I just felt so much better on the progesterone.\"  She uses [facility].    Will route this info to  for review and direction.    LAZARO HANSEN RN    "

## 2025-06-25 NOTE — TELEPHONE ENCOUNTER
Called patient and told her RX for provera is at pharmacy for her to .  Pt verbalized understanding of duo pill not being covered by ins.  Pt agrees with POC.  LAZARO HANSEN RN

## 2025-07-07 ENCOUNTER — MYC MEDICAL ADVICE (OUTPATIENT)
Dept: FAMILY MEDICINE | Facility: OTHER | Age: 50
End: 2025-07-07

## 2025-07-08 DIAGNOSIS — N95.1 PERIMENOPAUSAL VASOMOTOR SYMPTOMS: Primary | ICD-10-CM

## 2025-07-08 RX ORDER — PROGESTERONE 100 MG/1
100 CAPSULE ORAL DAILY
Qty: 90 CAPSULE | Refills: 1 | Status: SHIPPED | OUTPATIENT
Start: 2025-07-08

## 2025-07-15 DIAGNOSIS — N95.1 PERIMENOPAUSAL VASOMOTOR SYMPTOMS: ICD-10-CM

## 2025-07-15 RX ORDER — PROGESTERONE 100 MG/1
100 CAPSULE ORAL DAILY
Qty: 90 CAPSULE | Refills: 1 | Status: SHIPPED | OUTPATIENT
Start: 2025-07-15

## 2025-07-15 NOTE — TELEPHONE ENCOUNTER
Medication was sent to  but will resend to [facility].  Only [facility] was listed as preferred so that is where I sent it.  New Erx sent

## 2025-07-16 NOTE — TELEPHONE ENCOUNTER
Patient states that prescription is not at [facility]. Please confirm that prescription has been received at [facility].

## 2025-08-03 ENCOUNTER — MYC MEDICAL ADVICE (OUTPATIENT)
Dept: FAMILY MEDICINE | Facility: OTHER | Age: 50
End: 2025-08-03

## 2025-08-05 ENCOUNTER — TELEPHONE (OUTPATIENT)
Dept: OBGYN | Facility: OTHER | Age: 50
End: 2025-08-05

## 2025-08-05 DIAGNOSIS — N95.1 PERIMENOPAUSAL VASOMOTOR SYMPTOMS: ICD-10-CM

## 2025-08-05 RX ORDER — PROGESTERONE 100 MG/1
100 CAPSULE ORAL DAILY
Qty: 90 CAPSULE | Refills: 0 | Status: SHIPPED | OUTPATIENT
Start: 2025-08-05

## (undated) DEVICE — PREP CHLORAPREP 26ML TINTED HI-LITE ORANGE 930815

## (undated) DEVICE — NDL-INSUFFLATION 120MM

## (undated) DEVICE — GLOVE PROTEXIS POWDER FREE 7.0 ORTHOPEDIC 2D73ET70

## (undated) DEVICE — EVAC SYSTEM CLEAR FLOW SC082500

## (undated) DEVICE — BLANKET BAIR HUGGER UPPER BODY 42268

## (undated) DEVICE — SPONGE RAY-TEC 4X4" 7317

## (undated) DEVICE — BLADE-SURG CLIPPER

## (undated) DEVICE — DRAPE SHEET REV FOLD 3/4 9349

## (undated) DEVICE — SU VICRYL 2-0 CT-1 36" UND J945H

## (undated) DEVICE — CORD-LAPAROSCOPIC MONOPOLAR-DISPOSABLE

## (undated) DEVICE — SUCTION-IRRIGATION STRYKEFLOW II (STRYKER)

## (undated) DEVICE — IRRIGATION-NACL 3000ML (BAG)

## (undated) DEVICE — TROCAR SLEEVE-KII 5X100MM

## (undated) DEVICE — DRAPE STERI TOWEL LG 1010

## (undated) DEVICE — SU VICRYL 0 CT-1 36" J346H

## (undated) DEVICE — SU VICRYL 0 CT-2 CR 8X18" J727D

## (undated) DEVICE — LABEL STERILE PREPRINTED FOR OR FRRH01-2M

## (undated) DEVICE — POSITIONING KIT-SLT

## (undated) DEVICE — ESU PENCIL W/SMOKE EVAC CVPLP2000

## (undated) DEVICE — ELECTRODE ELECTROSURGICAL SPATULA 33CMX5MM 5600180

## (undated) DEVICE — LABEL-STERILE PREPRINTED FOR OR

## (undated) DEVICE — NEEDLE HYPO 22X1-1/2 SAFETY 305900

## (undated) DEVICE — GLV-7.0 PROTEXIS PI CLASSIC LF/PF

## (undated) DEVICE — PACK BASIN SET UP SUTCNBSBBA

## (undated) DEVICE — BAG PRESSURE INFUSION 1000ML COLORED GA 8808

## (undated) DEVICE — SOL WATER IRRIG 1000ML BOTTLE 2F7114

## (undated) DEVICE — TRAY-SKIN PREP POVIDONE/IODINE

## (undated) DEVICE — ENDOMETRIAL ABLATION DEVICE-NOVASURE

## (undated) DEVICE — DRAPE-STERI 45X60CM #1010

## (undated) DEVICE — PUNCTURE CLOSURE DEVICE

## (undated) DEVICE — DRAPE BACK TABLE  44X90" 8377

## (undated) DEVICE — ESU GROUND PAD ADULT W/CORD E7507

## (undated) DEVICE — CAUTERY PAD-POLYHESIVE II ADULT

## (undated) DEVICE — DRSG-NON ADHERING 3 X 8 TELFA

## (undated) DEVICE — SOLUTION IV IRRIGATION 0.9% NACL 3L R8206

## (undated) DEVICE — PACK-GYN CYSTO-CUSTOM

## (undated) DEVICE — SU DERMABOND ADVANCED .7ML DNX12

## (undated) DEVICE — TUBING SUCTION 20FT N620A

## (undated) DEVICE — NDL INSUFFLATION 13GA 120MM C2201

## (undated) DEVICE — SOL NACL 0.9% INJ 1000ML BAG 2B1324X

## (undated) DEVICE — JELLY LUBRICATING SURGILUBE 2OZ TUBE

## (undated) DEVICE — SCD SLEEVE-KNEE REG.

## (undated) DEVICE — BLANKET-BAIR UPPER BODY

## (undated) DEVICE — TUBING INSUFFLATION INSUFFLATOR FILTER HIGH FLOW 031322-01

## (undated) DEVICE — ENDO TROCAR SLEEVE KII ADV FIXATION 05X100MM CFS02

## (undated) DEVICE — LIGHT HANDLE COVER

## (undated) DEVICE — SOL-NACL 0.9% 1000ML

## (undated) DEVICE — GLV 8.5 BIOGEL LATEX 30485-01

## (undated) DEVICE — SYSTEM CLEARIFY VISUALIZATION 21-345

## (undated) DEVICE — LIGASURE-5MM BLUNT TIP LAPAROSCOPIC

## (undated) DEVICE — TOPICAL SKIN ADHESIVE EXOFIN

## (undated) DEVICE — SLEEVE SCD EXPRESS KNEE LENGTH MED 9529

## (undated) DEVICE — CANISTER-SUCTION 2000CC

## (undated) DEVICE — NDL-SPINAL 22G X 3.5IN QUINCKE

## (undated) DEVICE — STERILE SLEEVE

## (undated) DEVICE — SANITARY NAPKINS-SINGLE

## (undated) DEVICE — SPONGE-LAPAROTOMY PADS 18 X 18

## (undated) DEVICE — CANISTER SUCTION MEDI-VAC GUARDIAN 2000ML 90D 65651-220

## (undated) DEVICE — TUBING IRR TUR Y TYPE 2C4041

## (undated) DEVICE — TUBING-INSUFFLATION/LAPAROFLATOR W/FILTER

## (undated) DEVICE — TUBING-INFLOW HYSTEROPUMP

## (undated) DEVICE — PACK GYN CYSTO CUSTOM SMA32GCMBF

## (undated) DEVICE — TUBING-OUTFLOW HYSTEROPUMP

## (undated) DEVICE — PACK-LAP LAVH-CUSTOM

## (undated) DEVICE — ESU LIGASURE MARYLAND LAPAROSCOPIC SLR/DVDR 5MMX37CM LF1937

## (undated) DEVICE — TRAY PREP DRY SKIN SCRUB 067

## (undated) DEVICE — PACK LAP LAVH CUSTOM SMA32LPMBG

## (undated) DEVICE — IRRIGATION-H2O 1000ML

## (undated) DEVICE — BLADE CLIPPER DISP 4406

## (undated) DEVICE — SU VICRYL 3-0 CT-2 27" UND J232H

## (undated) DEVICE — CATH-URETHRAL 14FR

## (undated) DEVICE — PRESSURE INFUSOR DISP. 3000CC

## (undated) DEVICE — SUCTION STRYKERFLOW II 250-070-500

## (undated) DEVICE — COVER LT HANDLE 2/PK 5160-2FG

## (undated) DEVICE — APPLICATOR-CHLORAPREP 26ML TINTED CHG 2%+ 70% IPA-SURGICAL

## (undated) DEVICE — ENDO TROCAR SHIELDED BLADED KII ADV FIX 05X100MM CFB03

## (undated) DEVICE — CATH SELF CATH MALE 14FR 414

## (undated) DEVICE — UTERINE MANIPULATOR-KRONNER MANIPUJECTOR

## (undated) DEVICE — UTERINE MANIPULATOR HUMI KRONER 6003

## (undated) DEVICE — BIN-UROLOGY / CYSTO

## (undated) DEVICE — DRSG-SPONGE X-RAY 4 X 4

## (undated) DEVICE — CATH TRAY 16FR METER W/STATLOCK LATEX] A902916

## (undated) DEVICE — TUBING-SUCTION 20FT

## (undated) DEVICE — TROCAR-5X100MM BLADED W/FIXATION

## (undated) DEVICE — GLV-8.5 BIOGEL LATEX

## (undated) DEVICE — ESU CORD MONOPOLAR 10'  E0510

## (undated) DEVICE — SOL NACL 0.9% IRRIG 1000ML BOTTLE 2F7124

## (undated) DEVICE — SPONGE LAP 18X18" X8435

## (undated) RX ORDER — GLYCOPYRROLATE 0.2 MG/ML
INJECTION, SOLUTION INTRAMUSCULAR; INTRAVENOUS
Status: DISPENSED
Start: 2019-09-11

## (undated) RX ORDER — PROPOFOL 10 MG/ML
INJECTION, EMULSION INTRAVENOUS
Status: DISPENSED
Start: 2025-05-14

## (undated) RX ORDER — FENTANYL CITRATE 50 UG/ML
INJECTION, SOLUTION INTRAMUSCULAR; INTRAVENOUS
Status: DISPENSED
Start: 2019-09-11

## (undated) RX ORDER — NEOSTIGMINE METHYLSULFATE 1 MG/ML
VIAL (ML) INJECTION
Status: DISPENSED
Start: 2019-09-11

## (undated) RX ORDER — KETAMINE HCL IN NACL, ISO-OSM 100MG/10ML
SYRINGE (ML) INJECTION
Status: DISPENSED
Start: 2019-09-11

## (undated) RX ORDER — PROPOFOL 10 MG/ML
INJECTION, EMULSION INTRAVENOUS
Status: DISPENSED
Start: 2019-09-11

## (undated) RX ORDER — KETOROLAC TROMETHAMINE 30 MG/ML
INJECTION, SOLUTION INTRAMUSCULAR; INTRAVENOUS
Status: DISPENSED
Start: 2019-09-11

## (undated) RX ORDER — FENTANYL CITRATE 50 UG/ML
INJECTION, SOLUTION INTRAMUSCULAR; INTRAVENOUS
Status: DISPENSED
Start: 2025-05-14

## (undated) RX ORDER — ONDANSETRON 2 MG/ML
INJECTION INTRAMUSCULAR; INTRAVENOUS
Status: DISPENSED
Start: 2019-09-11

## (undated) RX ORDER — DEXAMETHASONE SODIUM PHOSPHATE 10 MG/ML
INJECTION, SOLUTION INTRAMUSCULAR; INTRAVENOUS
Status: DISPENSED
Start: 2019-09-11

## (undated) RX ORDER — LIDOCAINE HYDROCHLORIDE 20 MG/ML
INJECTION, SOLUTION EPIDURAL; INFILTRATION; INTRACAUDAL; PERINEURAL
Status: DISPENSED
Start: 2019-09-11